# Patient Record
Sex: FEMALE | Race: WHITE | NOT HISPANIC OR LATINO | URBAN - METROPOLITAN AREA
[De-identification: names, ages, dates, MRNs, and addresses within clinical notes are randomized per-mention and may not be internally consistent; named-entity substitution may affect disease eponyms.]

---

## 2017-01-30 ENCOUNTER — OUTPATIENT (OUTPATIENT)
Dept: OUTPATIENT SERVICES | Facility: HOSPITAL | Age: 82
LOS: 1 days | Discharge: HOME | End: 2017-01-30

## 2017-06-27 DIAGNOSIS — R91.8 OTHER NONSPECIFIC ABNORMAL FINDING OF LUNG FIELD: ICD-10-CM

## 2017-10-23 ENCOUNTER — OUTPATIENT (OUTPATIENT)
Dept: OUTPATIENT SERVICES | Facility: HOSPITAL | Age: 82
LOS: 1 days | Discharge: HOME | End: 2017-10-23

## 2017-10-23 DIAGNOSIS — Z12.31 ENCOUNTER FOR SCREENING MAMMOGRAM FOR MALIGNANT NEOPLASM OF BREAST: ICD-10-CM

## 2018-09-29 ENCOUNTER — INPATIENT (INPATIENT)
Facility: HOSPITAL | Age: 83
LOS: 5 days | Discharge: NURSING, REHAB OR LONG-TERM CARE | End: 2018-10-05
Attending: SPECIALIST | Admitting: SPECIALIST

## 2018-09-29 VITALS
SYSTOLIC BLOOD PRESSURE: 167 MMHG | RESPIRATION RATE: 18 BRPM | HEART RATE: 110 BPM | OXYGEN SATURATION: 96 % | DIASTOLIC BLOOD PRESSURE: 72 MMHG

## 2018-09-29 LAB
ALBUMIN SERPL ELPH-MCNC: 4.4 G/DL — SIGNIFICANT CHANGE UP (ref 3.5–5.2)
ALP SERPL-CCNC: 74 U/L — SIGNIFICANT CHANGE UP (ref 30–115)
ALT FLD-CCNC: 16 U/L — SIGNIFICANT CHANGE UP (ref 0–41)
ANION GAP SERPL CALC-SCNC: 22 MMOL/L — HIGH (ref 7–14)
APTT BLD: 28.4 SEC — SIGNIFICANT CHANGE UP (ref 27–39.2)
AST SERPL-CCNC: 23 U/L — SIGNIFICANT CHANGE UP (ref 0–41)
BASOPHILS # BLD AUTO: 0.06 K/UL — SIGNIFICANT CHANGE UP (ref 0–0.2)
BASOPHILS NFR BLD AUTO: 0.6 % — SIGNIFICANT CHANGE UP (ref 0–1)
BILIRUB SERPL-MCNC: 0.4 MG/DL — SIGNIFICANT CHANGE UP (ref 0.2–1.2)
BLD GP AB SCN SERPL QL: SIGNIFICANT CHANGE UP
BUN SERPL-MCNC: 30 MG/DL — HIGH (ref 10–20)
CALCIUM SERPL-MCNC: 9.9 MG/DL — SIGNIFICANT CHANGE UP (ref 8.5–10.1)
CHLORIDE SERPL-SCNC: 93 MMOL/L — LOW (ref 98–110)
CO2 SERPL-SCNC: 19 MMOL/L — SIGNIFICANT CHANGE UP (ref 17–32)
CREAT SERPL-MCNC: 1.1 MG/DL — SIGNIFICANT CHANGE UP (ref 0.7–1.5)
EOSINOPHIL # BLD AUTO: 0.16 K/UL — SIGNIFICANT CHANGE UP (ref 0–0.7)
EOSINOPHIL NFR BLD AUTO: 1.6 % — SIGNIFICANT CHANGE UP (ref 0–8)
GLUCOSE SERPL-MCNC: 130 MG/DL — HIGH (ref 70–99)
HCT VFR BLD CALC: 37.7 % — SIGNIFICANT CHANGE UP (ref 37–47)
HGB BLD-MCNC: 12.9 G/DL — SIGNIFICANT CHANGE UP (ref 12–16)
IMM GRANULOCYTES NFR BLD AUTO: 0.4 % — HIGH (ref 0.1–0.3)
INR BLD: 1.14 RATIO — SIGNIFICANT CHANGE UP (ref 0.65–1.3)
LYMPHOCYTES # BLD AUTO: 3.44 K/UL — HIGH (ref 1.2–3.4)
LYMPHOCYTES # BLD AUTO: 34.1 % — SIGNIFICANT CHANGE UP (ref 20.5–51.1)
MCHC RBC-ENTMCNC: 29.9 PG — SIGNIFICANT CHANGE UP (ref 27–31)
MCHC RBC-ENTMCNC: 34.2 G/DL — SIGNIFICANT CHANGE UP (ref 32–37)
MCV RBC AUTO: 87.5 FL — SIGNIFICANT CHANGE UP (ref 81–99)
MONOCYTES # BLD AUTO: 0.8 K/UL — HIGH (ref 0.1–0.6)
MONOCYTES NFR BLD AUTO: 7.9 % — SIGNIFICANT CHANGE UP (ref 1.7–9.3)
NEUTROPHILS # BLD AUTO: 5.6 K/UL — SIGNIFICANT CHANGE UP (ref 1.4–6.5)
NEUTROPHILS NFR BLD AUTO: 55.4 % — SIGNIFICANT CHANGE UP (ref 42.2–75.2)
NRBC # BLD: 0 /100 WBCS — SIGNIFICANT CHANGE UP (ref 0–0)
PLATELET # BLD AUTO: 225 K/UL — SIGNIFICANT CHANGE UP (ref 130–400)
POTASSIUM SERPL-MCNC: 4.1 MMOL/L — SIGNIFICANT CHANGE UP (ref 3.5–5)
POTASSIUM SERPL-SCNC: 4.1 MMOL/L — SIGNIFICANT CHANGE UP (ref 3.5–5)
PROT SERPL-MCNC: 7 G/DL — SIGNIFICANT CHANGE UP (ref 6–8)
PROTHROM AB SERPL-ACNC: 12.3 SEC — SIGNIFICANT CHANGE UP (ref 9.95–12.87)
RBC # BLD: 4.31 M/UL — SIGNIFICANT CHANGE UP (ref 4.2–5.4)
RBC # FLD: 12.2 % — SIGNIFICANT CHANGE UP (ref 11.5–14.5)
SODIUM SERPL-SCNC: 134 MMOL/L — LOW (ref 135–146)
TYPE + AB SCN PNL BLD: SIGNIFICANT CHANGE UP
WBC # BLD: 10.1 K/UL — SIGNIFICANT CHANGE UP (ref 4.8–10.8)
WBC # FLD AUTO: 10.1 K/UL — SIGNIFICANT CHANGE UP (ref 4.8–10.8)

## 2018-09-29 RX ORDER — MORPHINE SULFATE 50 MG/1
2 CAPSULE, EXTENDED RELEASE ORAL ONCE
Qty: 0 | Refills: 0 | Status: DISCONTINUED | OUTPATIENT
Start: 2018-09-29 | End: 2018-09-29

## 2018-09-29 RX ORDER — ONDANSETRON 8 MG/1
4 TABLET, FILM COATED ORAL ONCE
Qty: 0 | Refills: 0 | Status: COMPLETED | OUTPATIENT
Start: 2018-09-29 | End: 2018-09-29

## 2018-09-29 RX ORDER — MORPHINE SULFATE 50 MG/1
4 CAPSULE, EXTENDED RELEASE ORAL ONCE
Qty: 0 | Refills: 0 | Status: DISCONTINUED | OUTPATIENT
Start: 2018-09-29 | End: 2018-09-29

## 2018-09-29 RX ADMIN — MORPHINE SULFATE 4 MILLIGRAM(S): 50 CAPSULE, EXTENDED RELEASE ORAL at 18:22

## 2018-09-29 RX ADMIN — ONDANSETRON 4 MILLIGRAM(S): 8 TABLET, FILM COATED ORAL at 20:47

## 2018-09-29 RX ADMIN — MORPHINE SULFATE 2 MILLIGRAM(S): 50 CAPSULE, EXTENDED RELEASE ORAL at 20:47

## 2018-09-29 RX ADMIN — MORPHINE SULFATE 4 MILLIGRAM(S): 50 CAPSULE, EXTENDED RELEASE ORAL at 19:46

## 2018-09-29 NOTE — ED ADULT NURSE NOTE - OBJECTIVE STATEMENT
patient states she tripped and fell off of one step and landed on her left hip. denies head trauma, denies loc. denies blood thinners. c/o left hip pain and states she cannot move left leg. b/l pedal pulses intact

## 2018-09-29 NOTE — ED ADULT NURSE NOTE - NSIMPLEMENTINTERV_GEN_ALL_ED
Implemented All Fall with Harm Risk Interventions:  [place] to call system. Call bell, personal items and telephone within reach. Instruct patient to call for assistance. Room bathroom lighting operational. Non-slip footwear when patient is off stretcher. Physically safe environment: no spills, clutter or unnecessary equipment. Stretcher in lowest position, wheels locked, appropriate side rails in place. Provide visual cue, wrist band, yellow gown, etc. Monitor gait and stability. Monitor for mental status changes and reorient to person, place, and time. Review medications for side effects contributing to fall risk. Reinforce activity limits and safety measures with patient and family. Provide visual clues: red socks.

## 2018-09-29 NOTE — ED PROVIDER NOTE - OBJECTIVE STATEMENT
Patient is an 89 yo F w/ hx of HTN, osteoporosis, p/w fall. Around 4:30 pm, patient had mechanical fall down one step landed on her left side. Patient experienced immediate pain, was not able to stand after incident. Pain is on her left hip radiates down the leg; pain is severe. No head injury, no LOC, no other extremity pain. Denies chest pain, SOB, blood thinners.

## 2018-09-29 NOTE — ED PROVIDER NOTE - ATTENDING CONTRIBUTION TO CARE
89y f h/o htn, OP, hl p/w fall. Was walking down sev steps, missed last step and fell onto her L side. Unwitnessed, pt denies head trauma or loc. Now with pain down L lat hip & thigh. Unable range hip 2/2 pain. No other injuries. Denies ha, neck pain, cp/sob, cough, nv abd pain, back pain, flank pain, urinary sx, rash, FNDs. PE: elderly f appears in pain, neck supple, chest atraumatic non-ttp no ecchymosis or crepitus, rrr nl s1s2 no mrg, ctab no wrr, abd soft ntnd no palpable masses no rgr, back no midline or paraspinal ttp, pelvis stable, LLE- +ttp over L lat hip and L groin, unable to range hip 2/2 pain, rom/strength intact throughout remainder of ext, sensation intact throughout, dpi, cr<2s, remainder of ext exam nl a/p: see mdm

## 2018-09-29 NOTE — CONSULT NOTE ADULT - SUBJECTIVE AND OBJECTIVE BOX
HIP FRACTURE CONSULT  T(C): --  HR: 110 (09-29-18 @ 20:13) (110 - 110)  BP: 148/67 (09-29-18 @ 20:13) (148/67 - 167/72)  RR: 18 (09-29-18 @ 17:30) (18 - 18)  SpO2: 96% (09-29-18 @ 17:30) (96% - 96%)    FEMUR NECK FRACTURE  ^FEMUR NECK FRACTURE  89F with GLF onto left side when descending one step. Pt fell on left side and had immediate pain in left hip. Denies head trauma or LOC. Denies numbness, tingling, paraesthesias.                           12.9   10.10 )-----------( 225      ( 29 Sep 2018 18:10 )             37.7     09-29    134<L>  |  93<L>  |  30<H>  ----------------------------<  130<H>  4.1   |  19  |  1.1    Ca    9.9      29 Sep 2018 18:10    TPro  7.0  /  Alb  4.4  /  TBili  0.4  /  DBili  x   /  AST  23  /  ALT  16  /  AlkPhos  74  09-29    PT/INR - ( 29 Sep 2018 18:10 )   PT: 12.30 sec;   INR: 1.14 ratio         PTT - ( 29 Sep 2018 18:10 )  PTT:28.4 sec    No Known Allergies    Physical exam :  NAD  BCRA  LLE  TTP over hip joint  Severe pain on motion.  N/V/I  The remainder of the musculoskeletal exam fails to reveal any acute deformity , or new areas of pain or sts.    Assesment:   89F with L femoral neck hip fracture  Plan:  Bed rest  Continue mechanical DVT prophylaxis  / heparin for DVT prophylaxis  NWB LLE  Medical risk assesment  rpt hgb  2 units prbc on hold for surgery.  Plan for surgery on 10/1  pre-op labs, chest xray, ekg, T&S  management per primary team

## 2018-09-29 NOTE — ED PROVIDER NOTE - PHYSICAL EXAMINATION
CONSTITUTIONAL: Well-developed; well-nourished; in acute distress.   SKIN: warm, dry.  HEAD: Normocephalic; atraumatic. No raccoon eyes; no francois sign.   EYES: PERRL, EOMI, no conjunctival erythema.  ENT: No nasal discharge; airway clear.  NECK: Supple; non tender. No midline tenderness.   CARD: S1, S2 normal; no murmurs, gallops, or rubs. Tachycardic.   RESP: left basilar crackles.  ABD: soft ntnd.  Pelvis: No pelvic instability.   EXT: left lower extremity is shortened and externally rotated. DP pulses 2+ bilat.   NEURO: Alert, oriented, grossly unremarkable. Sensations intact in lower extremities bilaterally.   PSYCH: Cooperative, appropriate.

## 2018-09-29 NOTE — ED PROVIDER NOTE - NS ED ROS FT
Head: No head injury.   Eyes:  No visual changes.  ENMT:  No dental injury.   Cardiac:  No chest pain, SOB.  Respiratory:  No cough or respiratory distress.   GI:  No nausea, vomiting, diarrhea or abdominal pain.  :  No dysuria, frequency or burning.  MS:  + left hip pain.   Neuro:  No headache or weakness.  No LOC. No head injury.   Skin:  No abrasions/lacerations.

## 2018-09-30 DIAGNOSIS — Z98.890 OTHER SPECIFIED POSTPROCEDURAL STATES: Chronic | ICD-10-CM

## 2018-09-30 LAB
ANION GAP SERPL CALC-SCNC: 18 MMOL/L — HIGH (ref 7–14)
APTT BLD: 28.3 SEC — SIGNIFICANT CHANGE UP (ref 27–39.2)
BASOPHILS # BLD AUTO: 0 K/UL — SIGNIFICANT CHANGE UP (ref 0–0.2)
BASOPHILS NFR BLD AUTO: 0 % — SIGNIFICANT CHANGE UP (ref 0–1)
BUN SERPL-MCNC: 22 MG/DL — HIGH (ref 10–20)
CALCIUM SERPL-MCNC: 9.4 MG/DL — SIGNIFICANT CHANGE UP (ref 8.5–10.1)
CHLORIDE SERPL-SCNC: 98 MMOL/L — SIGNIFICANT CHANGE UP (ref 98–110)
CO2 SERPL-SCNC: 21 MMOL/L — SIGNIFICANT CHANGE UP (ref 17–32)
CREAT SERPL-MCNC: 0.8 MG/DL — SIGNIFICANT CHANGE UP (ref 0.7–1.5)
EOSINOPHIL # BLD AUTO: 0 K/UL — SIGNIFICANT CHANGE UP (ref 0–0.7)
EOSINOPHIL NFR BLD AUTO: 0 % — SIGNIFICANT CHANGE UP (ref 0–8)
GLUCOSE SERPL-MCNC: 158 MG/DL — HIGH (ref 70–99)
HCT VFR BLD CALC: 37.7 % — SIGNIFICANT CHANGE UP (ref 37–47)
HGB BLD-MCNC: 12.5 G/DL — SIGNIFICANT CHANGE UP (ref 12–16)
IMM GRANULOCYTES NFR BLD AUTO: 0.4 % — HIGH (ref 0.1–0.3)
INR BLD: 1.2 RATIO — SIGNIFICANT CHANGE UP (ref 0.65–1.3)
LYMPHOCYTES # BLD AUTO: 0.77 K/UL — LOW (ref 1.2–3.4)
LYMPHOCYTES # BLD AUTO: 8.2 % — LOW (ref 20.5–51.1)
MAGNESIUM SERPL-MCNC: 2 MG/DL — SIGNIFICANT CHANGE UP (ref 1.8–2.4)
MCHC RBC-ENTMCNC: 29.6 PG — SIGNIFICANT CHANGE UP (ref 27–31)
MCHC RBC-ENTMCNC: 33.2 G/DL — SIGNIFICANT CHANGE UP (ref 32–37)
MCV RBC AUTO: 89.3 FL — SIGNIFICANT CHANGE UP (ref 81–99)
MONOCYTES # BLD AUTO: 0.46 K/UL — SIGNIFICANT CHANGE UP (ref 0.1–0.6)
MONOCYTES NFR BLD AUTO: 4.9 % — SIGNIFICANT CHANGE UP (ref 1.7–9.3)
NEUTROPHILS # BLD AUTO: 8.08 K/UL — HIGH (ref 1.4–6.5)
NEUTROPHILS NFR BLD AUTO: 86.5 % — HIGH (ref 42.2–75.2)
PHOSPHATE SERPL-MCNC: 3.8 MG/DL — SIGNIFICANT CHANGE UP (ref 2.1–4.9)
PLATELET # BLD AUTO: 192 K/UL — SIGNIFICANT CHANGE UP (ref 130–400)
POTASSIUM SERPL-MCNC: 5 MMOL/L — SIGNIFICANT CHANGE UP (ref 3.5–5)
POTASSIUM SERPL-SCNC: 5 MMOL/L — SIGNIFICANT CHANGE UP (ref 3.5–5)
PROTHROM AB SERPL-ACNC: 12.9 SEC — HIGH (ref 9.95–12.87)
RBC # BLD: 4.22 M/UL — SIGNIFICANT CHANGE UP (ref 4.2–5.4)
RBC # FLD: 12.5 % — SIGNIFICANT CHANGE UP (ref 11.5–14.5)
SODIUM SERPL-SCNC: 137 MMOL/L — SIGNIFICANT CHANGE UP (ref 135–146)
TYPE + AB SCN PNL BLD: SIGNIFICANT CHANGE UP
WBC # BLD: 9.35 K/UL — SIGNIFICANT CHANGE UP (ref 4.8–10.8)
WBC # FLD AUTO: 9.35 K/UL — SIGNIFICANT CHANGE UP (ref 4.8–10.8)

## 2018-09-30 RX ORDER — ATORVASTATIN CALCIUM 80 MG/1
20 TABLET, FILM COATED ORAL AT BEDTIME
Qty: 0 | Refills: 0 | Status: DISCONTINUED | OUTPATIENT
Start: 2018-09-30 | End: 2018-10-02

## 2018-09-30 RX ORDER — SODIUM CHLORIDE 9 MG/ML
1000 INJECTION INTRAMUSCULAR; INTRAVENOUS; SUBCUTANEOUS ONCE
Qty: 0 | Refills: 0 | Status: COMPLETED | OUTPATIENT
Start: 2018-09-30 | End: 2018-09-30

## 2018-09-30 RX ORDER — AMLODIPINE BESYLATE 2.5 MG/1
5 TABLET ORAL AT BEDTIME
Qty: 0 | Refills: 0 | Status: DISCONTINUED | OUTPATIENT
Start: 2018-09-30 | End: 2018-10-02

## 2018-09-30 RX ORDER — AMLODIPINE BESYLATE 2.5 MG/1
1 TABLET ORAL
Qty: 0 | Refills: 0 | COMMUNITY

## 2018-09-30 RX ORDER — MORPHINE SULFATE 50 MG/1
4 CAPSULE, EXTENDED RELEASE ORAL ONCE
Qty: 0 | Refills: 0 | Status: DISCONTINUED | OUTPATIENT
Start: 2018-09-30 | End: 2018-09-30

## 2018-09-30 RX ORDER — TRAMADOL HYDROCHLORIDE 50 MG/1
50 TABLET ORAL THREE TIMES A DAY
Qty: 0 | Refills: 0 | Status: DISCONTINUED | OUTPATIENT
Start: 2018-09-30 | End: 2018-10-02

## 2018-09-30 RX ORDER — ATENOLOL 25 MG/1
1 TABLET ORAL
Qty: 0 | Refills: 0 | COMMUNITY

## 2018-09-30 RX ORDER — TRAMADOL HYDROCHLORIDE 50 MG/1
25 TABLET ORAL EVERY 6 HOURS
Qty: 0 | Refills: 0 | Status: DISCONTINUED | OUTPATIENT
Start: 2018-09-30 | End: 2018-09-30

## 2018-09-30 RX ORDER — ONDANSETRON 8 MG/1
4 TABLET, FILM COATED ORAL ONCE
Qty: 0 | Refills: 0 | Status: COMPLETED | OUTPATIENT
Start: 2018-09-30 | End: 2018-09-30

## 2018-09-30 RX ORDER — ONDANSETRON 8 MG/1
4 TABLET, FILM COATED ORAL EVERY 6 HOURS
Qty: 0 | Refills: 0 | Status: DISCONTINUED | OUTPATIENT
Start: 2018-09-30 | End: 2018-10-02

## 2018-09-30 RX ORDER — INFLUENZA VIRUS VACCINE 15; 15; 15; 15 UG/.5ML; UG/.5ML; UG/.5ML; UG/.5ML
0.5 SUSPENSION INTRAMUSCULAR ONCE
Qty: 0 | Refills: 0 | Status: DISCONTINUED | OUTPATIENT
Start: 2018-09-30 | End: 2018-10-05

## 2018-09-30 RX ORDER — HEPARIN SODIUM 5000 [USP'U]/ML
5000 INJECTION INTRAVENOUS; SUBCUTANEOUS EVERY 8 HOURS
Qty: 0 | Refills: 0 | Status: DISCONTINUED | OUTPATIENT
Start: 2018-09-30 | End: 2018-10-02

## 2018-09-30 RX ORDER — ATENOLOL 25 MG/1
25 TABLET ORAL DAILY
Qty: 0 | Refills: 0 | Status: DISCONTINUED | OUTPATIENT
Start: 2018-09-30 | End: 2018-10-02

## 2018-09-30 RX ORDER — SODIUM CHLORIDE 9 MG/ML
1000 INJECTION, SOLUTION INTRAVENOUS ONCE
Qty: 0 | Refills: 0 | Status: DISCONTINUED | OUTPATIENT
Start: 2018-09-30 | End: 2018-09-30

## 2018-09-30 RX ADMIN — ONDANSETRON 4 MILLIGRAM(S): 8 TABLET, FILM COATED ORAL at 15:55

## 2018-09-30 RX ADMIN — MORPHINE SULFATE 4 MILLIGRAM(S): 50 CAPSULE, EXTENDED RELEASE ORAL at 09:55

## 2018-09-30 RX ADMIN — ONDANSETRON 4 MILLIGRAM(S): 8 TABLET, FILM COATED ORAL at 21:30

## 2018-09-30 RX ADMIN — ATENOLOL 25 MILLIGRAM(S): 25 TABLET ORAL at 06:27

## 2018-09-30 RX ADMIN — AMLODIPINE BESYLATE 5 MILLIGRAM(S): 2.5 TABLET ORAL at 21:20

## 2018-09-30 RX ADMIN — TRAMADOL HYDROCHLORIDE 50 MILLIGRAM(S): 50 TABLET ORAL at 15:55

## 2018-09-30 RX ADMIN — MORPHINE SULFATE 4 MILLIGRAM(S): 50 CAPSULE, EXTENDED RELEASE ORAL at 00:30

## 2018-09-30 RX ADMIN — SODIUM CHLORIDE 500 MILLILITER(S): 9 INJECTION INTRAMUSCULAR; INTRAVENOUS; SUBCUTANEOUS at 05:27

## 2018-09-30 RX ADMIN — HEPARIN SODIUM 5000 UNIT(S): 5000 INJECTION INTRAVENOUS; SUBCUTANEOUS at 21:21

## 2018-09-30 RX ADMIN — ATORVASTATIN CALCIUM 20 MILLIGRAM(S): 80 TABLET, FILM COATED ORAL at 21:21

## 2018-09-30 RX ADMIN — Medication 10 MILLIGRAM(S): at 06:27

## 2018-09-30 RX ADMIN — ONDANSETRON 4 MILLIGRAM(S): 8 TABLET, FILM COATED ORAL at 07:16

## 2018-09-30 RX ADMIN — TRAMADOL HYDROCHLORIDE 50 MILLIGRAM(S): 50 TABLET ORAL at 21:30

## 2018-09-30 RX ADMIN — HEPARIN SODIUM 5000 UNIT(S): 5000 INJECTION INTRAVENOUS; SUBCUTANEOUS at 06:28

## 2018-09-30 RX ADMIN — ONDANSETRON 4 MILLIGRAM(S): 8 TABLET, FILM COATED ORAL at 02:01

## 2018-09-30 RX ADMIN — HEPARIN SODIUM 5000 UNIT(S): 5000 INJECTION INTRAVENOUS; SUBCUTANEOUS at 15:55

## 2018-09-30 RX ADMIN — MORPHINE SULFATE 2 MILLIGRAM(S): 50 CAPSULE, EXTENDED RELEASE ORAL at 09:55

## 2018-09-30 NOTE — H&P ADULT - NSHPREVIEWOFSYSTEMS_GEN_ALL_CORE
REVIEW OF SYSTEMS:    CONSTITUTIONAL: No weakness or fevers  EYES/ENT: No visual changes  NECK: No pain or stiffness  RESPIRATORY: No cough, wheezing, hemoptysis; No shortness of breath  CARDIOVASCULAR: No chest pain or palpitations, no lower extremity edema  GASTROINTESTINAL: No abdominal pain. No nausea or vomiting; No diarrhea or constipation. No bloody or black colored stool  GENITOURINARY: No pain with urination, no increased urinary frequency, no dark o r bloody urine  NEUROLOGICAL: No numbness or weakness  SKIN: No itching, rashes  MSK: see hpi

## 2018-09-30 NOTE — H&P ADULT - ASSESSMENT
# Left femoral neck hip fracture  - Seen by Ortho - planned for surgery 10/1, pre-op labs, chest xray, ekg, T&S & medical risk assessment  - NWB Left  - 2unit PRBC on hold  - PRN analgesics    # HTN    # Low Bicarb    DVT ppx - heparin  NWB left  DASH diet 89/F hx of HTN, p/w fall from standing after loss of balance. Found to have left fremoral neck fx. Admit to medicine for Left femoral neck fx.    # Left femoral neck hip fracture  - Seen by Ortho - planned for surgery 10/1, pre-op labs, chest xray, ekg, T&S & medical risk assessment  - NWB Left  - 2unit PRBC on hold - please order once T&S is completed  - PRN analgesics    # HTN  - C/w atenolol, ACEi, CCB    # DLD - c/w statin    # Low Bicarb  - Give 1L IVF, repeat BMP  - No hypotension    # Lung Nodule  - Followed with pulm (dr. Barraza) - was advised no longer needs to follows since nodule was stable over 2 yrs    DVT ppx - heparin  NWB left  DASH diet  No GI ppx    Full Code 89/F hx of HTN, p/w fall from standing after loss of balance. Found to have left fremoral neck fx. Admit to medicine for Left femoral neck fx.    # Left femoral neck hip fracture  - Seen by Ortho - planned for surgery 10/1, pre-op labs, chest xray, ekg, T&S & medical risk assessment  - NWB Left  - 2unit PRBC on hold - please order once T&S is completed  - PRN analgesics    # HTN  - C/w atenolol, ACEi, CCB    # DLD - c/w statin    # CKD IIIBI? w/Low Bicarb  - Give 1L IVF, repeat BMP  - Check UA & Ur protein / Cr ratio  - No hypotension    # Lung Nodule  - Followed with pulm (dr. Barraza) - was advised no longer needs to follows since nodule was stable over 2 yrs    DVT ppx - heparin  NWB left  DASH diet  No GI ppx    Full Code

## 2018-09-30 NOTE — H&P ADULT - HISTORY OF PRESENT ILLNESS
89/F hx of osteoporosis, HTN, p/w fall    On day of presentation pt fell from one step and landed on left side. Pt was unable to stand, has left hip pain radiating down left leg.    No other injuries, no LOC, no chest pain, no SOB. 89/F hx of HTN, p/w fall from standing after loss of balance.    On day of presentation pt fell from one step and landed on left side. She reports she lost her balance and fell to her left side and had pain to left side. Pt was unable to stand, has left hip pain radiating down left leg. Neighbors that live below heard the fall and came to help her. No other injuries, no LOC, no chest pain, no SOB. No palpatations, no chest pain, no sob.    Pt is active at home, drives, lives alone.

## 2018-09-30 NOTE — H&P ADULT - NSHPLABSRESULTS_GEN_ALL_CORE
12.9   10.10 )-----------( 225      ( 29 Sep 2018 18:10 )             37.7       09-29    134<L>  |  93<L>  |  30<H>  ----------------------------<  130<H>  4.1   |  19  |  1.1    Ca    9.9      29 Sep 2018 18:10    TPro  7.0  /  Alb  4.4  /  TBili  0.4  /  DBili  x   /  AST  23  /  ALT  16  /  AlkPhos  74  09-29          PT/INR - ( 29 Sep 2018 18:10 )   PT: 12.30 sec;   INR: 1.14 ratio         PTT - ( 29 Sep 2018 18:10 )  PTT:28.4 sec    Lactate Trend    CAPILLARY BLOOD GLUCOSE    CTH   No CT evidence of acute intracranial pathology.    Slight increase of chronic migraine vascular ischemic changes since nearly 6 years prior.    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 Bilateral femoral heads in   anatomic alignment.    Bones are diffusely osteopenic

## 2018-09-30 NOTE — H&P ADULT - NSHPPHYSICALEXAM_GEN_ALL_CORE
Vital Signs Last 24 Hrs  T(C): 35.7 (30 Sep 2018 00:11), Max: 35.7 (30 Sep 2018 00:11)  T(F): 96.2 (30 Sep 2018 00:11), Max: 96.2 (30 Sep 2018 00:11)  HR: 107 (30 Sep 2018 00:11) (107 - 110)  BP: 146/65 (30 Sep 2018 00:11) (146/65 - 167/72)  RR: 20 (30 Sep 2018 00:11) (18 - 20)  SpO2: 96% (30 Sep 2018 00:11) (96% - 96%) Vital Signs Last 24 Hrs  T(C): 35.7 (30 Sep 2018 00:11), Max: 35.7 (30 Sep 2018 00:11)  T(F): 96.2 (30 Sep 2018 00:11), Max: 96.2 (30 Sep 2018 00:11)  HR: 107 (30 Sep 2018 00:11) (107 - 110)  BP: 146/65 (30 Sep 2018 00:11) (146/65 - 167/72)  RR: 20 (30 Sep 2018 00:11) (18 - 20)  SpO2: 96% (30 Sep 2018 00:11) (96% - 96%)    PHYSICAL EXAM:  GENERAL: NAD, speaks in full sentences, no signs of respiratory distress  HEAD:  Atraumatic, Normocephalic  EYES: EOMI, PERRLA, conjunctiva and sclera clear  NECK: Supple, No JVD  CHEST/LUNG: Clear to auscultation bilaterally; No wheeze; No crackles; No accessory muscles used  HEART: Regular rate and rhythm; No murmurs;   ABDOMEN: Soft, Nontender, Nondistended; Bowel sounds present; No guarding  EXTREMITIES:  no edema, left lower ext is turned outward, no erythema or rash to lateral left leg  PSYCH: AAOx3  NEUROLOGY: non-focal  SKIN: No rashes or lesions

## 2018-10-01 RX ORDER — SODIUM CHLORIDE 9 MG/ML
1000 INJECTION INTRAMUSCULAR; INTRAVENOUS; SUBCUTANEOUS
Qty: 0 | Refills: 0 | Status: DISCONTINUED | OUTPATIENT
Start: 2018-10-01 | End: 2018-10-02

## 2018-10-01 RX ADMIN — TRAMADOL HYDROCHLORIDE 50 MILLIGRAM(S): 50 TABLET ORAL at 21:25

## 2018-10-01 RX ADMIN — HEPARIN SODIUM 5000 UNIT(S): 5000 INJECTION INTRAVENOUS; SUBCUTANEOUS at 21:24

## 2018-10-01 RX ADMIN — TRAMADOL HYDROCHLORIDE 50 MILLIGRAM(S): 50 TABLET ORAL at 14:34

## 2018-10-01 RX ADMIN — SODIUM CHLORIDE 75 MILLILITER(S): 9 INJECTION INTRAMUSCULAR; INTRAVENOUS; SUBCUTANEOUS at 22:55

## 2018-10-01 RX ADMIN — AMLODIPINE BESYLATE 5 MILLIGRAM(S): 2.5 TABLET ORAL at 21:24

## 2018-10-01 RX ADMIN — HEPARIN SODIUM 5000 UNIT(S): 5000 INJECTION INTRAVENOUS; SUBCUTANEOUS at 05:01

## 2018-10-01 RX ADMIN — Medication 10 MILLIGRAM(S): at 05:01

## 2018-10-01 RX ADMIN — HEPARIN SODIUM 5000 UNIT(S): 5000 INJECTION INTRAVENOUS; SUBCUTANEOUS at 14:34

## 2018-10-01 RX ADMIN — TRAMADOL HYDROCHLORIDE 50 MILLIGRAM(S): 50 TABLET ORAL at 05:03

## 2018-10-01 RX ADMIN — ATORVASTATIN CALCIUM 20 MILLIGRAM(S): 80 TABLET, FILM COATED ORAL at 21:24

## 2018-10-01 RX ADMIN — ATENOLOL 25 MILLIGRAM(S): 25 TABLET ORAL at 05:01

## 2018-10-01 NOTE — PROGRESS NOTE ADULT - ASSESSMENT
________________________________________________________________________________  DAILY PROGRESS NOTE:    ================== SUBJECTIVE ==================    ART LYONS  /   89y  /  Female  /  MRN#: [id number]  Patient is a 89y old Female who presents with a chief complaint of Fall from standing (01 Oct 2018 13:16)  Currently admitted to medicine with the primary diagnosis of Femur neck fracture    HOSPITAL DAY: 2d     TODAY: Patient was seen this morning at bedside. Currently, the patient reports no complaints     Review of systems is otherwise negative.    =================== OBJECTIVE ===================    VITAL SIGNS: Last 24 Hours  T(C): 35.8 (01 Oct 2018 18:04), Max: 36.7 (30 Sep 2018 21:45)  T(F): 96.4 (01 Oct 2018 18:04), Max: 98 (30 Sep 2018 21:45)  HR: 82 (01 Oct 2018 17:59) (75 - 101)  BP: 138/65 (01 Oct 2018 17:59) (138/65 - 171/73)  BP(mean): --  RR: 18 (01 Oct 2018 17:59) (18 - 20)  SpO2: 95% (01 Oct 2018 00:00) (95% - 98%)    10-01-18 @ 07:01  -  10-01-18 @ 18:22  --------------------------------------------------------  IN: 480 mL / OUT: 500 mL / NET: -20 mL    GENERAL: NAD, well-groomed, well-developed  NERVOUS SYSTEM:  Alert & Oriented X3, Good concentration; Motor Strength 5/5 B/L upper and lower extremities; DTRs 2+ intact and symmetric  CHEST/LUNG: Clear to percussion bilaterally; No rales, rhonchi, wheezing, or rubs  HEART: Regular rate and rhythm; No murmurs, rubs, or gallops  ABDOMEN: Soft, Nontender, Nondistended; Bowel sounds present  EXTREMITIES:  2+ Peripheral Pulses, No clubbing, cyanosis, or edema, pain L hip  SKIN: No rashes or lesions    ===================== LABS =====================                        12.5   9.35  )-----------( 192      ( 30 Sep 2018 07:25 )             37.7     09-30    137  |  98  |  22<H>  ----------------------------<  158<H>  5.0   |  21  |  0.8    Ca    9.4      30 Sep 2018 07:25  Phos  3.8     09-30  Mg     2.0     09-30      PT/INR - ( 30 Sep 2018 07:25 )   PT: 12.90 sec;   INR: 1.20 ratio         PTT - ( 30 Sep 2018 07:25 )  PTT:28.3 sec    ================== ALLERGIES ===================  No Known Allergies    ==================== MEDS =====================  amLODIPine   Tablet 5 milliGRAM(s) Oral at bedtime  ATENolol  Tablet 25 milliGRAM(s) Oral daily  atorvastatin 20 milliGRAM(s) Oral at bedtime  enalapril 10 milliGRAM(s) Oral daily  heparin  Injectable 5000 Unit(s) SubCutaneous every 8 hours  influenza   Vaccine 0.5 milliLiter(s) IntraMuscular once  traMADol 50 milliGRAM(s) Oral three times a day    PRN MEDICATIONS  ondansetron    Tablet 4 milliGRAM(s) Oral every 6 hours PRN    ================= ASSESS/PLAN ==================  Patient is a 89y old Female who presents with a chief complaint of Fall from standing (01 Oct 2018 13:16)  Currently admitted to medicine with the primary diagnosis of Femur neck fracture      PLAN  # Left femoral neck hip fracture  - Seen by Ortho - planned for surgery 10/1, pre-op labs, chest xray, ekg, T&S & medical risk assessment  - NWB Left  - 2unit PRBC on hold   - PRN analgesics  - As per cardiology   moderate risk patient (age) and surgery (orthopedics surgery), functional capacity at limited side, less than 4 METS, generally by accepting a moderate risk she can have the surgery done, she is already on beta blocker and statin  if bp remains high or HR stays above 90 then increase Atenolol to 50 mg daily  post op care as per the events     # HTN  - C/w atenolol, ACEi, CCB, BP OK    # DLD - c/w statin    # CKD IIIBI? w/Low Bicarb  - Give 1L IVF, repeat BMP  - Check UA & Ur protein / Cr ratio  - No hypotension    # Lung Nodule  - Followed with pulm (dr. Ross) - was advised no longer needs to follows since nodule was stable over 2 yrs      GI PPX: -  DVT PPX: Heparin SQ    DIET:  (X) Regular  /  () Cardiac  / () Renal  /  () Diabetic  /  () Fluid restriction  /   () NPO  ACTIVITY:  () Ad Mandi  /  (X) Advance as Tolerated  /  () Bed Rest  /  () Fall Precaution  /  () Seizure precaution    ================= PRESENT TODAY ==================    Gaviria Catheter (X) No / () Yes   Indication:  Central Line (X) No / ()Yes  Indication:  IV Fluids (X) No / ()Yes  Indication:     ================= DISPOSITION ==================    Patient to be discharged when condition(s) optimized.            Discharge to:  (X) Home  /  () SNF  /  () Hospice  /  () Other            Home services:  () Home health  /  () Rehab  /  () IV Abx  /  () Education  /  () None            Counseled on/Discussed cessation of:  () Risky behaviors  /  () Smoking cessation  /  () Diet  /  () Exercise  /  () Other    ================= CODE STATUS =================                  (X) FULL CODE     |     () DNR     |     () DNI

## 2018-10-01 NOTE — PROGRESS NOTE ADULT - SUBJECTIVE AND OBJECTIVE BOX
ART LYONS  89y  Female      Patient is a 89y old  Female who presents with a chief complaint of Fall from standing (01 Oct 2018 06:10), Sustained L hip fracture, plan OR today. No c/o Cp, LOC, head trauma or SOB      INTERVAL HPI/OVERNIGHT EVENTS:   No overnight events    HEALTH ISSUES - PROBLEM Dx:    Dyslipidemia    Essential hypertension    Lung nodule    Osteoporosis.      Vital Signs Last 24 Hrs  T(C): 35.6 (01 Oct 2018 07:42), Max: 36.7 (30 Sep 2018 21:45)  T(F): 96.1 (01 Oct 2018 07:42), Max: 98 (30 Sep 2018 21:45)  HR: 75 (01 Oct 2018 07:42) (75 - 102)  BP: 171/73 (01 Oct 2018 07:42) (141/68 - 171/74)  BP(mean): --  RR: 18 (01 Oct 2018 07:42) (18 - 20)  SpO2: 95% (01 Oct 2018 00:00) (95% - 98%)    amLODIPine   Tablet 5 milliGRAM(s) Oral at bedtime  ATENolol  Tablet 25 milliGRAM(s) Oral daily  atorvastatin 20 milliGRAM(s) Oral at bedtime  enalapril 10 milliGRAM(s) Oral daily  heparin  Injectable 5000 Unit(s) SubCutaneous every 8 hours  influenza   Vaccine 0.5 milliLiter(s) IntraMuscular once  ondansetron    Tablet 4 milliGRAM(s) Oral every 6 hours PRN  traMADol 50 milliGRAM(s) Oral three times a day      PHYSICAL EXAM:  GENERAL: NAD, well-groomed, well-developed  HEAD:  Atraumatic, Normocephalic  EYES: EOMI, PERRLA, conjunctiva and sclera clear  HEENT: No tonsillar erythema, exudates, or enlargement; Moist mucous membranes, Good dentition, No lesions  NECK: Supple, No JVD, Normal thyroid  NERVOUS SYSTEM:  Alert & Oriented X3, Good concentration; Motor Strength 5/5 B/L upper and lower extremities; DTRs 2+ intact and symmetric  CHEST/LUNG: Clear to percussion bilaterally; No rales, rhonchi, wheezing, or rubs  HEART: Regular rate and rhythm; No murmurs, rubs, or gallops  ABDOMEN: Soft, Nontender, Nondistended; Bowel sounds present  EXTREMITIES:  2+ Peripheral Pulses, No clubbing, cyanosis, or edema, pain L hip  LYMPH: No lymphadenopathy noted  SKIN: No rashes or lesions      LABS                         12.5   9.35  )-----------( 192      ( 30 Sep 2018 07:25 )             37.7     09-30    137  |  98  |  22<H>  ----------------------------<  158<H>  5.0   |  21  |  0.8    Ca    9.4      30 Sep 2018 07:25  Phos  3.8     09-30  Mg     2.0     09-30    TPro  7.0  /  Alb  4.4  /  TBili  0.4  /  DBili  x   /  AST  23  /  ALT  16  /  AlkPhos  74  09-29      RADIOLOGY & ADDITIONAL TESTS:    ASSESSMENT & PLAN:     · Assessment		  89/F hx of HTN, p/w fall from standing after loss of balance. Found to have left femoral neck fx. Admitted to medicine for Left femoral neck fx.    # Left femoral neck hip fracture  - Seen by Ortho - planned for surgery 10/1, pre-op labs, chest xray, ekg, T&S & medical risk assessment  - NWB Left  - 2unit PRBC on hold - please order once T&S is completed  - PRN analgesics  Await cardio eval today    # HTN  - C/w atenolol, ACEi, CCB, BP OK    # DLD - c/w statin    # CKD IIIBI? w/Low Bicarb  - Give 1L IVF, repeat BMP  - Check UA & Ur protein / Cr ratio  - No hypotension    # Lung Nodule  - Followed with pulm (dr. Barraza) - was advised no longer needs to follows since nodule was stable over 2 yrs    DVT ppx - heparin  NWB left  DASH diet  No GI ppx    Full Code

## 2018-10-01 NOTE — CONSULT NOTE ADULT - SUBJECTIVE AND OBJECTIVE BOX
Patient is a 89y old  Female who presents with a chief complaint of Fall from standing (01 Oct 2018 08:58)    patient had a fall, sustained left hip fx, needs surgery. she had no cardiac event or cardiac symptom in the past, suffers of HTN and hyperlipidemia, as out patient BP had been stable, as per patient, in the hospital elevated. Her ECG at the HR of 114/min was normal      HPI:  89/F hx of HTN, p/w fall from standing after loss of balance.    On day of presentation pt fell from one step and landed on left side. She reports she lost her balance and fell to her left side and had pain to left side. Pt was unable to stand, has left hip pain radiating down left leg. Neighbors that live below heard the fall and came to help her. No other injuries, no LOC, no chest pain, no SOB. No palpitations , no chest pain, no sob.    Pt is active at home, drives, lives alone. (30 Sep 2018 01:35)      PAST MEDICAL & SURGICAL HISTORY:  Lung nodule  Osteoporosis  Dyslipidemia  Essential hypertension  H/O lumpectomy: 2015, right, pt states benign pathology      PREVIOUS DIAGNOSTIC TESTING:      MEDICATIONS  (STANDING):  amLODIPine   Tablet 5 milliGRAM(s) Oral at bedtime  ATENolol  Tablet 25 milliGRAM(s) Oral daily  atorvastatin 20 milliGRAM(s) Oral at bedtime  enalapril 10 milliGRAM(s) Oral daily  heparin  Injectable 5000 Unit(s) SubCutaneous every 8 hours  influenza   Vaccine 0.5 milliLiter(s) IntraMuscular once  traMADol 50 milliGRAM(s) Oral three times a day    MEDICATIONS  (PRN):  ondansetron    Tablet 4 milliGRAM(s) Oral every 6 hours PRN Nausea and/or Vomiting      FAMILY HISTORY:  No pertinent family history in first degree relatives      SOCIAL HISTORY:  CIGARETTES: no  ALCOHOL: no  DRUGS: no                      REVIEW OF SYSTEMS:  CONSTITUTIONAL: No distress, Looks stable  NECK: No pain or stiffnes  RESPIRATORY: No cough, wheezing, shortness of breath  CARDIOVASCULAR: No chest pain, SOB, palpitations, leg swelling  GASTROINTESTINAL: No abdominal or epigastric pain. No nausea, vomiting, or hematemesis;  No melena.  NEUROLOGICAL: No dizziness, no headaches, no memory loss, loss of strength  SKIN: No itching, burning, rashes, or lesions   ENDOCRINE: No heat or cold intolerance  MUSCULOSKELETAL: left hip pain, No leg swelling  PSYCHIATRIC: No depression, anxiety, mood swings, or difficulty sleeping  ALLERGY: No hives, itching, rash          Vital Signs Last 24 Hrs  T(C): 35.6 (01 Oct 2018 07:42), Max: 36.7 (30 Sep 2018 21:45)  T(F): 96.1 (01 Oct 2018 07:42), Max: 98 (30 Sep 2018 21:45)  HR: 75 (01 Oct 2018 07:42) (75 - 102)  BP: 171/73 (01 Oct 2018 07:42) (141/68 - 171/74)  BP(mean): --  RR: 18 (01 Oct 2018 07:42) (18 - 20)  SpO2: 95% (01 Oct 2018 00:00) (95% - 98%)                      PHYSICAL EXAM:  GENERAL: No distress, slender, petite female  HEAD:  Atraumatic, Normocephalic  NECK: Supple, No JVD, No Bruit  NERVOUS SYSTEM:  Alert, Awake, Oriented to time, place, person; Normal memory and speech  CHEST/LUNG: Normal air entry to lung base bilaterally; No wheeze, no crackle, no rales  HEART: Regular heart beat, S1, A2, P2, No S3, No gallop, No murmur  ABDOMEN: Soft, Non tender, Non distended; Bowel sounds present  EXTREMITIES:  2+ Peripheral Pulses, No clubbing, No edema, left leg slightly shorter and deviated due to Fx  SKIN: No rashes or lesions      ECG: < from: 12 Lead ECG (09.30.18 @ 09:35) >   Normal sinus rhythm  Normal ECG    < end of copied text >  initial ECG sinus tachycardia, non significant ST or Tabnormality    I&O's Detail    01 Oct 2018 07:01  -  01 Oct 2018 13:16  --------------------------------------------------------  IN:    Oral Fluid: 480 mL  Total IN: 480 mL    OUT:    Voided: 300 mL  Total OUT: 300 mL    Total NET: 180 mL          LABS:                        12.5   9.35  )-----------( 192      ( 30 Sep 2018 07:25 )             37.7     09-30    137  |  98  |  22<H>  ----------------------------<  158<H>  5.0   |  21  |  0.8    Ca    9.4      30 Sep 2018 07:25  Phos  3.8     09-30  Mg     2.0     09-30    TPro  7.0  /  Alb  4.4  /  TBili  0.4  /  DBili  x   /  AST  23  /  ALT  16  /  AlkPhos  74  09-29        PT/INR - ( 30 Sep 2018 07:25 )   PT: 12.90 sec;   INR: 1.20 ratio         PTT - ( 30 Sep 2018 07:25 )  PTT:28.3 sec    I&O's Summary    01 Oct 2018 07:01  -  01 Oct 2018 13:16  --------------------------------------------------------  IN: 480 mL / OUT: 300 mL / NET: 180 mL        RADIOLOGY & ADDITIONAL STUDIES: < from: Xray Chest 1 View- PORTABLE-Urgent (09.29.18 @ 18:46) >  Mild left basilar atelectasis; otherwise, no radiographic evidence of   acute cardiopulmonary disease.    < end of copied text >  < from: Xray Hip 2-3 Views, Left (09.29.18 @ 18:52) >  Coxa breva with external rotation of the femoral shaft likely reflecting   a left basi-cervical femoral neck fracture. Bilateral femoral heads in   anatomic alignment.    < end of copied text >

## 2018-10-01 NOTE — PROGRESS NOTE ADULT - SUBJECTIVE AND OBJECTIVE BOX
PREOP NOTE      ICU Vital Signs Last 24 Hrs  T(C): 36.1 (01 Oct 2018 00:00), Max: 36.7 (30 Sep 2018 21:45)  T(F): 96.9 (01 Oct 2018 00:00), Max: 98 (30 Sep 2018 21:45)  HR: 101 (01 Oct 2018 04:58) (95 - 115)  BP: 141/68 (01 Oct 2018 04:58) (141/68 - 171/74)  BP(mean): --  ABP: --  ABP(mean): --  RR: 18 (01 Oct 2018 00:00) (18 - 20)  SpO2: 95% (01 Oct 2018 00:00) (95% - 98%)                          12.5   9.35  )-----------( 192      ( 30 Sep 2018 07:25 )             37.7     OR TODAY PENDING CLEARANCE  R hip fx for jony   NPO  bedrest  pain control  2U placed on hold  continue DVT ppx - do not hold PREOP NOTE      ICU Vital Signs Last 24 Hrs  T(C): 36.1 (01 Oct 2018 00:00), Max: 36.7 (30 Sep 2018 21:45)  T(F): 96.9 (01 Oct 2018 00:00), Max: 98 (30 Sep 2018 21:45)  HR: 101 (01 Oct 2018 04:58) (95 - 115)  BP: 141/68 (01 Oct 2018 04:58) (141/68 - 171/74)  BP(mean): --  ABP: --  ABP(mean): --  RR: 18 (01 Oct 2018 00:00) (18 - 20)  SpO2: 95% (01 Oct 2018 00:00) (95% - 98%)                          12.5   9.35  )-----------( 192      ( 30 Sep 2018 07:25 )             37.7     OR TODAY PENDING CLEARANCE  L hip fx for jony   NPO  bedrest  pain control  2U placed on hold  continue DVT ppx - do not hold

## 2018-10-02 ENCOUNTER — RESULT REVIEW (OUTPATIENT)
Age: 83
End: 2018-10-02

## 2018-10-02 LAB
ANION GAP SERPL CALC-SCNC: 14 MMOL/L — SIGNIFICANT CHANGE UP (ref 7–14)
ANION GAP SERPL CALC-SCNC: 15 MMOL/L — HIGH (ref 7–14)
BASOPHILS # BLD AUTO: 0.03 K/UL — SIGNIFICANT CHANGE UP (ref 0–0.2)
BASOPHILS # BLD AUTO: 0.04 K/UL — SIGNIFICANT CHANGE UP (ref 0–0.2)
BASOPHILS NFR BLD AUTO: 0.4 % — SIGNIFICANT CHANGE UP (ref 0–1)
BASOPHILS NFR BLD AUTO: 0.4 % — SIGNIFICANT CHANGE UP (ref 0–1)
BUN SERPL-MCNC: 14 MG/DL — SIGNIFICANT CHANGE UP (ref 10–20)
BUN SERPL-MCNC: 15 MG/DL — SIGNIFICANT CHANGE UP (ref 10–20)
CALCIUM SERPL-MCNC: 8.4 MG/DL — LOW (ref 8.5–10.1)
CALCIUM SERPL-MCNC: 8.8 MG/DL — SIGNIFICANT CHANGE UP (ref 8.5–10.1)
CHLORIDE SERPL-SCNC: 97 MMOL/L — LOW (ref 98–110)
CHLORIDE SERPL-SCNC: 99 MMOL/L — SIGNIFICANT CHANGE UP (ref 98–110)
CO2 SERPL-SCNC: 22 MMOL/L — SIGNIFICANT CHANGE UP (ref 17–32)
CO2 SERPL-SCNC: 24 MMOL/L — SIGNIFICANT CHANGE UP (ref 17–32)
CREAT SERPL-MCNC: 0.7 MG/DL — SIGNIFICANT CHANGE UP (ref 0.7–1.5)
CREAT SERPL-MCNC: 0.8 MG/DL — SIGNIFICANT CHANGE UP (ref 0.7–1.5)
EOSINOPHIL # BLD AUTO: 0.06 K/UL — SIGNIFICANT CHANGE UP (ref 0–0.7)
EOSINOPHIL # BLD AUTO: 0.14 K/UL — SIGNIFICANT CHANGE UP (ref 0–0.7)
EOSINOPHIL NFR BLD AUTO: 0.8 % — SIGNIFICANT CHANGE UP (ref 0–8)
EOSINOPHIL NFR BLD AUTO: 1.5 % — SIGNIFICANT CHANGE UP (ref 0–8)
GLUCOSE BLDC GLUCOMTR-MCNC: 114 MG/DL — HIGH (ref 70–99)
GLUCOSE SERPL-MCNC: 115 MG/DL — HIGH (ref 70–99)
GLUCOSE SERPL-MCNC: 97 MG/DL — SIGNIFICANT CHANGE UP (ref 70–99)
HCT VFR BLD CALC: 34.1 % — LOW (ref 37–47)
HCT VFR BLD CALC: 37.1 % — SIGNIFICANT CHANGE UP (ref 37–47)
HGB BLD-MCNC: 11.3 G/DL — LOW (ref 12–16)
HGB BLD-MCNC: 12.4 G/DL — SIGNIFICANT CHANGE UP (ref 12–16)
IMM GRANULOCYTES NFR BLD AUTO: 0.5 % — HIGH (ref 0.1–0.3)
IMM GRANULOCYTES NFR BLD AUTO: 0.5 % — HIGH (ref 0.1–0.3)
LYMPHOCYTES # BLD AUTO: 0.71 K/UL — LOW (ref 1.2–3.4)
LYMPHOCYTES # BLD AUTO: 2.61 K/UL — SIGNIFICANT CHANGE UP (ref 1.2–3.4)
LYMPHOCYTES # BLD AUTO: 27.8 % — SIGNIFICANT CHANGE UP (ref 20.5–51.1)
LYMPHOCYTES # BLD AUTO: 9.7 % — LOW (ref 20.5–51.1)
MCHC RBC-ENTMCNC: 30 PG — SIGNIFICANT CHANGE UP (ref 27–31)
MCHC RBC-ENTMCNC: 30.1 PG — SIGNIFICANT CHANGE UP (ref 27–31)
MCHC RBC-ENTMCNC: 33.1 G/DL — SIGNIFICANT CHANGE UP (ref 32–37)
MCHC RBC-ENTMCNC: 33.4 G/DL — SIGNIFICANT CHANGE UP (ref 32–37)
MCV RBC AUTO: 90 FL — SIGNIFICANT CHANGE UP (ref 81–99)
MCV RBC AUTO: 90.5 FL — SIGNIFICANT CHANGE UP (ref 81–99)
MONOCYTES # BLD AUTO: 0.38 K/UL — SIGNIFICANT CHANGE UP (ref 0.1–0.6)
MONOCYTES # BLD AUTO: 1.11 K/UL — HIGH (ref 0.1–0.6)
MONOCYTES NFR BLD AUTO: 11.8 % — HIGH (ref 1.7–9.3)
MONOCYTES NFR BLD AUTO: 5.2 % — SIGNIFICANT CHANGE UP (ref 1.7–9.3)
NEUTROPHILS # BLD AUTO: 5.45 K/UL — SIGNIFICANT CHANGE UP (ref 1.4–6.5)
NEUTROPHILS # BLD AUTO: 6.12 K/UL — SIGNIFICANT CHANGE UP (ref 1.4–6.5)
NEUTROPHILS NFR BLD AUTO: 58 % — SIGNIFICANT CHANGE UP (ref 42.2–75.2)
NEUTROPHILS NFR BLD AUTO: 83.4 % — HIGH (ref 42.2–75.2)
NRBC # BLD: 0 /100 WBCS — SIGNIFICANT CHANGE UP (ref 0–0)
PLATELET # BLD AUTO: 129 K/UL — LOW (ref 130–400)
PLATELET # BLD AUTO: 164 K/UL — SIGNIFICANT CHANGE UP (ref 130–400)
POTASSIUM SERPL-MCNC: 3.8 MMOL/L — SIGNIFICANT CHANGE UP (ref 3.5–5)
POTASSIUM SERPL-MCNC: 4.2 MMOL/L — SIGNIFICANT CHANGE UP (ref 3.5–5)
POTASSIUM SERPL-SCNC: 3.8 MMOL/L — SIGNIFICANT CHANGE UP (ref 3.5–5)
POTASSIUM SERPL-SCNC: 4.2 MMOL/L — SIGNIFICANT CHANGE UP (ref 3.5–5)
RBC # BLD: 3.77 M/UL — LOW (ref 4.2–5.4)
RBC # BLD: 4.12 M/UL — LOW (ref 4.2–5.4)
RBC # FLD: 12.4 % — SIGNIFICANT CHANGE UP (ref 11.5–14.5)
RBC # FLD: 12.4 % — SIGNIFICANT CHANGE UP (ref 11.5–14.5)
SODIUM SERPL-SCNC: 135 MMOL/L — SIGNIFICANT CHANGE UP (ref 135–146)
SODIUM SERPL-SCNC: 136 MMOL/L — SIGNIFICANT CHANGE UP (ref 135–146)
WBC # BLD: 7.34 K/UL — SIGNIFICANT CHANGE UP (ref 4.8–10.8)
WBC # BLD: 9.4 K/UL — SIGNIFICANT CHANGE UP (ref 4.8–10.8)
WBC # FLD AUTO: 7.34 K/UL — SIGNIFICANT CHANGE UP (ref 4.8–10.8)
WBC # FLD AUTO: 9.4 K/UL — SIGNIFICANT CHANGE UP (ref 4.8–10.8)

## 2018-10-02 RX ORDER — ATENOLOL 25 MG/1
25 TABLET ORAL DAILY
Qty: 0 | Refills: 0 | Status: DISCONTINUED | OUTPATIENT
Start: 2018-10-02 | End: 2018-10-05

## 2018-10-02 RX ORDER — MORPHINE SULFATE 50 MG/1
4 CAPSULE, EXTENDED RELEASE ORAL EVERY 4 HOURS
Qty: 0 | Refills: 0 | Status: DISCONTINUED | OUTPATIENT
Start: 2018-10-02 | End: 2018-10-05

## 2018-10-02 RX ORDER — ATORVASTATIN CALCIUM 80 MG/1
20 TABLET, FILM COATED ORAL AT BEDTIME
Qty: 0 | Refills: 0 | Status: DISCONTINUED | OUTPATIENT
Start: 2018-10-02 | End: 2018-10-05

## 2018-10-02 RX ORDER — SODIUM CHLORIDE 9 MG/ML
1000 INJECTION INTRAMUSCULAR; INTRAVENOUS; SUBCUTANEOUS
Qty: 0 | Refills: 0 | Status: DISCONTINUED | OUTPATIENT
Start: 2018-10-02 | End: 2018-10-05

## 2018-10-02 RX ORDER — CEFAZOLIN SODIUM 1 G
2000 VIAL (EA) INJECTION EVERY 8 HOURS
Qty: 0 | Refills: 0 | Status: COMPLETED | OUTPATIENT
Start: 2018-10-02 | End: 2018-10-03

## 2018-10-02 RX ORDER — MORPHINE SULFATE 50 MG/1
4 CAPSULE, EXTENDED RELEASE ORAL
Qty: 0 | Refills: 0 | Status: DISCONTINUED | OUTPATIENT
Start: 2018-10-02 | End: 2018-10-03

## 2018-10-02 RX ORDER — AMLODIPINE BESYLATE 2.5 MG/1
5 TABLET ORAL AT BEDTIME
Qty: 0 | Refills: 0 | Status: DISCONTINUED | OUTPATIENT
Start: 2018-10-02 | End: 2018-10-05

## 2018-10-02 RX ORDER — SODIUM CHLORIDE 9 MG/ML
1000 INJECTION, SOLUTION INTRAVENOUS
Qty: 0 | Refills: 0 | Status: DISCONTINUED | OUTPATIENT
Start: 2018-10-02 | End: 2018-10-05

## 2018-10-02 RX ORDER — HEPARIN SODIUM 5000 [USP'U]/ML
5000 INJECTION INTRAVENOUS; SUBCUTANEOUS EVERY 8 HOURS
Qty: 0 | Refills: 0 | Status: DISCONTINUED | OUTPATIENT
Start: 2018-10-02 | End: 2018-10-05

## 2018-10-02 RX ORDER — MEPERIDINE HYDROCHLORIDE 50 MG/ML
12.5 INJECTION INTRAMUSCULAR; INTRAVENOUS; SUBCUTANEOUS
Qty: 0 | Refills: 0 | Status: DISCONTINUED | OUTPATIENT
Start: 2018-10-02 | End: 2018-10-03

## 2018-10-02 RX ORDER — OXYCODONE HYDROCHLORIDE 5 MG/1
10 TABLET ORAL EVERY 4 HOURS
Qty: 0 | Refills: 0 | Status: DISCONTINUED | OUTPATIENT
Start: 2018-10-02 | End: 2018-10-05

## 2018-10-02 RX ORDER — ONDANSETRON 8 MG/1
4 TABLET, FILM COATED ORAL ONCE
Qty: 0 | Refills: 0 | Status: COMPLETED | OUTPATIENT
Start: 2018-10-02 | End: 2018-10-02

## 2018-10-02 RX ORDER — MORPHINE SULFATE 50 MG/1
2 CAPSULE, EXTENDED RELEASE ORAL
Qty: 0 | Refills: 0 | Status: DISCONTINUED | OUTPATIENT
Start: 2018-10-02 | End: 2018-10-03

## 2018-10-02 RX ORDER — OXYCODONE HYDROCHLORIDE 5 MG/1
5 TABLET ORAL EVERY 4 HOURS
Qty: 0 | Refills: 0 | Status: DISCONTINUED | OUTPATIENT
Start: 2018-10-02 | End: 2018-10-05

## 2018-10-02 RX ORDER — ONDANSETRON 8 MG/1
4 TABLET, FILM COATED ORAL EVERY 6 HOURS
Qty: 0 | Refills: 0 | Status: DISCONTINUED | OUTPATIENT
Start: 2018-10-02 | End: 2018-10-05

## 2018-10-02 RX ORDER — TRAMADOL HYDROCHLORIDE 50 MG/1
50 TABLET ORAL THREE TIMES A DAY
Qty: 0 | Refills: 0 | Status: DISCONTINUED | OUTPATIENT
Start: 2018-10-02 | End: 2018-10-05

## 2018-10-02 RX ADMIN — HEPARIN SODIUM 5000 UNIT(S): 5000 INJECTION INTRAVENOUS; SUBCUTANEOUS at 22:18

## 2018-10-02 RX ADMIN — ONDANSETRON 4 MILLIGRAM(S): 8 TABLET, FILM COATED ORAL at 23:25

## 2018-10-02 RX ADMIN — Medication 10 MILLIGRAM(S): at 05:25

## 2018-10-02 RX ADMIN — TRAMADOL HYDROCHLORIDE 50 MILLIGRAM(S): 50 TABLET ORAL at 05:29

## 2018-10-02 RX ADMIN — ATENOLOL 25 MILLIGRAM(S): 25 TABLET ORAL at 05:25

## 2018-10-02 RX ADMIN — HEPARIN SODIUM 5000 UNIT(S): 5000 INJECTION INTRAVENOUS; SUBCUTANEOUS at 14:22

## 2018-10-02 RX ADMIN — SODIUM CHLORIDE 75 MILLILITER(S): 9 INJECTION INTRAMUSCULAR; INTRAVENOUS; SUBCUTANEOUS at 22:14

## 2018-10-02 RX ADMIN — HEPARIN SODIUM 5000 UNIT(S): 5000 INJECTION INTRAVENOUS; SUBCUTANEOUS at 05:25

## 2018-10-02 RX ADMIN — TRAMADOL HYDROCHLORIDE 50 MILLIGRAM(S): 50 TABLET ORAL at 14:22

## 2018-10-02 NOTE — BRIEF OPERATIVE NOTE - PROCEDURE
<<-----Click on this checkbox to enter Procedure Hemiarthroplasty of hip by anterior approach  10/02/2018    Active  JHIPFLORES

## 2018-10-02 NOTE — PROGRESS NOTE ADULT - SUBJECTIVE AND OBJECTIVE BOX
ART LYONS  89y  Female      Patient is a 89y old  Female who presents with a chief complaint of Fall from standing (01 Oct 2018 18:22), she sustained a L femoral neck fx and is awaiting surgical repair which has been postponed until today due to scheduling.  She has no c/o SOB or CP        INTERVAL HPI/OVERNIGHT EVENTS:  No overnight events, pt was NPO all of yesterday but had a small egg salad last night when it was determined surgery would be rescheduled until today.    HEALTH ISSUES - PROBLEM Dx:    Dyslipidemia    Essential hypertension    Lung nodule    Osteoporosis.            Vital Signs Last 24 Hrs  T(C): 36.6 (02 Oct 2018 07:42), Max: 36.6 (02 Oct 2018 00:27)  T(F): 97.9 (02 Oct 2018 07:42), Max: 97.9 (02 Oct 2018 07:42)  HR: 74 (02 Oct 2018 07:42) (74 - 89)  BP: 143/63 (02 Oct 2018 07:42) (110/58 - 143/63)  BP(mean): --  RR: 18 (02 Oct 2018 07:42) (18 - 18)  SpO2: 95% (02 Oct 2018 00:27) (95% - 95%)    amLODIPine   Tablet 5 milliGRAM(s) Oral at bedtime  ATENolol  Tablet 25 milliGRAM(s) Oral daily  atorvastatin 20 milliGRAM(s) Oral at bedtime  enalapril 10 milliGRAM(s) Oral daily  heparin  Injectable 5000 Unit(s) SubCutaneous every 8 hours  influenza   Vaccine 0.5 milliLiter(s) IntraMuscular once  ondansetron    Tablet 4 milliGRAM(s) Oral every 6 hours PRN  sodium chloride 0.9%. 1000 milliLiter(s) IV Continuous <Continuous>  traMADol 50 milliGRAM(s) Oral three times a day      PHYSICAL EXAM:  GENERAL: NAD, well-groomed, well-developed  HEAD:  Atraumatic, Normocephalic  EYES: EOMI, PERRLA, conjunctiva and sclera clear  HEENT: No tonsillar erythema, exudates, or enlargement; Dry mucous membranes, Good dentition, No lesions  NECK: Supple, No JVD, Normal thyroid  NERVOUS SYSTEM:  Alert & Oriented X3, Good concentration; Motor Strength 5/5 B/L upper and lower extremities; DTRs 2+ intact and symmetric  CHEST/LUNG: Clear to percussion bilaterally; No rales, rhonchi, wheezing, or rubs  HEART: Regular rate and rhythm; No murmurs, rubs, or gallops  ABDOMEN: Soft, Nontender, Nondistended; Bowel sounds present  EXTREMITIES:  2+ Peripheral Pulses, No clubbing, cyanosis, or edema  LYMPH: No lymphadenopathy noted  SKIN: No rashes or lesions      LABS                         12.4   9.40  )-----------( 164      ( 02 Oct 2018 05:45 )             37.1     10-02    135  |  97<L>  |  14  ----------------------------<  97  4.2   |  24  |  0.7    Ca    8.8      02 Oct 2018 05:45        RADIOLOGY & ADDITIONAL TESTS:    ASSESSMENT & PLAN:   · Assessment		  89/F hx of HTN, p/w fall from standing after loss of balance. Found to have left femoral neck fx. Admitted to medicine for Left femoral neck fx and is still awaiting surgery.    # Left femoral neck hip fracture  - Seen by Ortho - planned for surgery 10/1, but rescheduled for today.  Had neg cardio evaluation, moderate surgical risk due age.  - NWB Left  - 2unit PRBC on hold - please order once T&S is completed  - PRN analgesics      # HTN  - C/w atenolol, ACEi, CCB, BP OK, check post op EKG    # DLD - c/w statin    # CKD IIIBI? w/Low Bicarb  - on IV hydration as NPO  - Check UA & Ur protein / Cr ratio  - No hypotension    # Lung Nodule  - Followed with pulm (dr. Barraza) - was advised no longer needs to follows since nodule was stable over 2 yrs    DVT ppx - heparin  NWB left  DASH diet  No GI ppx    Full Code

## 2018-10-02 NOTE — PROGRESS NOTE ADULT - ASSESSMENT
________________________________________________________________________________  DAILY PROGRESS NOTE:    ================== SUBJECTIVE ==================    ART LYONS  /   89y  /  Female  /  MRN#: [id number]  Patient is a 89y old Female who presents with a chief complaint of Fall from standing (02 Oct 2018 08:15)  Currently admitted to medicine with the primary diagnosis of Femur neck fracture    HOSPITAL DAY: 3d     OVERNIGHT EVENTS: None    TODAY: Patient was seen this morning at bedside. Currently, the patient reports no complaints     Review of systems is otherwise negative.    =================== OBJECTIVE ===================    VITAL SIGNS: Last 24 Hours  T(C): 36.6 (02 Oct 2018 07:42), Max: 36.6 (02 Oct 2018 00:27)  T(F): 97.9 (02 Oct 2018 07:42), Max: 97.9 (02 Oct 2018 07:42)  HR: 74 (02 Oct 2018 07:42) (74 - 89)  BP: 143/63 (02 Oct 2018 07:42) (110/58 - 143/63)  BP(mean): --  RR: 18 (02 Oct 2018 07:42) (18 - 18)  SpO2: 95% (02 Oct 2018 00:27) (95% - 95%)    10-01-18 @ 07:01  -  10-02-18 @ 07:00  --------------------------------------------------------  IN: 480 mL / OUT: 500 mL / NET: -20 mL    10-02-18 @ 07:01  -  10-02-18 @ 11:16  --------------------------------------------------------  IN: 0 mL / OUT: 0 mL / NET: 0 mL      GENERAL: NAD, well-groomed, well-developed  NERVOUS SYSTEM:  Alert & Oriented X3, Good concentration; Motor Strength 5/5 B/L upper and lower extremities; DTRs 2+ intact and symmetric  CHEST/LUNG: Clear to percussion bilaterally; No rales, rhonchi, wheezing, or rubs  HEART: Regular rate and rhythm; No murmurs, rubs, or gallops  ABDOMEN: Soft, Nontender, Nondistended; Bowel sounds present  EXTREMITIES:  2+ Peripheral Pulses, No clubbing, cyanosis, or edema, pain L hip  SKIN: No rashes or lesions    ===================== LABS =====================                        12.4   9.40  )-----------( 164      ( 02 Oct 2018 05:45 )             37.1     10-02    135  |  97<L>  |  14  ----------------------------<  97  4.2   |  24  |  0.7    Ca    8.8      02 Oct 2018 05:45    ================== ALLERGIES ===================  No Known Allergies    ==================== MEDS =====================  amLODIPine   Tablet 5 milliGRAM(s) Oral at bedtime  ATENolol  Tablet 25 milliGRAM(s) Oral daily  atorvastatin 20 milliGRAM(s) Oral at bedtime  enalapril 10 milliGRAM(s) Oral daily  heparin  Injectable 5000 Unit(s) SubCutaneous every 8 hours  influenza   Vaccine 0.5 milliLiter(s) IntraMuscular once  sodium chloride 0.9%. 1000 milliLiter(s) IV Continuous <Continuous>  traMADol 50 milliGRAM(s) Oral three times a day    PRN MEDICATIONS  ondansetron    Tablet 4 milliGRAM(s) Oral every 6 hours PRN    ================= ASSESS/PLAN ==================  Patient is a 89y old Female who presents with a chief complaint of Fall from standing (02 Oct 2018 08:15)  Currently admitted to medicine with the primary diagnosis of Femur neck fracture      PLAN  # Left femoral neck hip fracture  - Seen by Ortho - planned for surgery today, seen by cardiology   - NWB Left  - 2unit PRBC on hold   - PRN analgesics  - As per cardiology   moderate risk patient (age) and surgery (orthopedics surgery), functional capacity at limited side, less than 4 METS, generally by accepting a moderate risk she can have the surgery done, she is already on beta blocker and statin  if bp remains high or HR stays above 90 then increase Atenolol to 50 mg daily  post op care as per the events     # HTN  - C/w atenolol, ACEi, CCB, BP OK    # DLD - c/w statin    # CKD IIIBI? w/Low Bicarb  - Give 1L IVF, repeat BMP  - Check UA & Ur protein / Cr ratio  - No hypotension    # Lung Nodule  - Followed with pulm (dr. Ross) - was advised no longer needs to follows since nodule was stable over 2 yrs      GI PPX: -  DVT PPX: Heparin SQ    DIET:  (X) Regular  /  () Cardiac  / () Renal  /  () Diabetic  /  () Fluid restriction  /   () NPO  ACTIVITY:  () Ad Mandi  /  (X) Advance as Tolerated  /  () Bed Rest  /  () Fall Precaution  /  () Seizure precaution    ================= PRESENT TODAY ==================    Gaviria Catheter (X) No / () Yes   Indication:  Central Line (X) No / ()Yes  Indication:  IV Fluids (X) No / ()Yes  Indication:     ================= DISPOSITION ==================    Patient to be discharged when condition(s) optimized.            Discharge to:  (X) Home  /  () SNF  /  () Hospice  /  () Other            Home services:  () Home health  /  () Rehab  /  () IV Abx  /  () Education  /  () None            Counseled on/Discussed cessation of:  () Risky behaviors  /  () Smoking cessation  /  () Diet  /  () Exercise  /  () Other    ================= CODE STATUS =================                  (X) FULL CODE     |     () DNR     |     () DNI

## 2018-10-02 NOTE — CHART NOTE - NSCHARTNOTEFT_GEN_A_CORE
PACU ANESTHESIA ADMISSION NOTE      Procedure: Hemiarthroplasty of hip by anterior approach    Post op diagnosis:  Closed fracture of neck of left femur, initial encounter      ____  Intubated  TV:______       Rate: ______      FiO2: ______    __x__  Patent Airway    ____  Full return of protective reflexes    __x__  Full recovery from anesthesia / back to baseline status    Vitals:  T(F): 97.6  HR: 76  BP: 113/54  RR: 16  SpO2: 100%    Mental Status:  ___x_ Awake   ___x__ Alert   _____ Drowsy   _____ Sedated    Nausea/Vomiting:  __x__ NO  ______Yes,   See Post - Op Orders          Pain Scale (0-10):  _____    Treatment: ____ None    ___x_ See Post - Op/PCA Orders    Post - Operative Fluids:   ____ Oral   _x___ See Post - Op Orders    Plan: Discharge:   ____Home       _x____Floor     _____Critical Care    _____  Other:_________________    Comments: patient tolerated procedure  transfer to PACU  No anesthesia complications

## 2018-10-03 LAB
ANION GAP SERPL CALC-SCNC: 17 MMOL/L — HIGH (ref 7–14)
BASOPHILS # BLD AUTO: 0.01 K/UL — SIGNIFICANT CHANGE UP (ref 0–0.2)
BASOPHILS NFR BLD AUTO: 0.1 % — SIGNIFICANT CHANGE UP (ref 0–1)
BUN SERPL-MCNC: 16 MG/DL — SIGNIFICANT CHANGE UP (ref 10–20)
CALCIUM SERPL-MCNC: 8.8 MG/DL — SIGNIFICANT CHANGE UP (ref 8.5–10.1)
CHLORIDE SERPL-SCNC: 101 MMOL/L — SIGNIFICANT CHANGE UP (ref 98–110)
CO2 SERPL-SCNC: 21 MMOL/L — SIGNIFICANT CHANGE UP (ref 17–32)
CREAT SERPL-MCNC: 0.9 MG/DL — SIGNIFICANT CHANGE UP (ref 0.7–1.5)
EOSINOPHIL # BLD AUTO: 0 K/UL — SIGNIFICANT CHANGE UP (ref 0–0.7)
EOSINOPHIL NFR BLD AUTO: 0 % — SIGNIFICANT CHANGE UP (ref 0–8)
GLUCOSE SERPL-MCNC: 171 MG/DL — HIGH (ref 70–99)
HCT VFR BLD CALC: 37.6 % — SIGNIFICANT CHANGE UP (ref 37–47)
HGB BLD-MCNC: 12.3 G/DL — SIGNIFICANT CHANGE UP (ref 12–16)
IMM GRANULOCYTES NFR BLD AUTO: 0.5 % — HIGH (ref 0.1–0.3)
LYMPHOCYTES # BLD AUTO: 0.51 K/UL — LOW (ref 1.2–3.4)
LYMPHOCYTES # BLD AUTO: 6.1 % — LOW (ref 20.5–51.1)
MCHC RBC-ENTMCNC: 29.5 PG — SIGNIFICANT CHANGE UP (ref 27–31)
MCHC RBC-ENTMCNC: 32.7 G/DL — SIGNIFICANT CHANGE UP (ref 32–37)
MCV RBC AUTO: 90.2 FL — SIGNIFICANT CHANGE UP (ref 81–99)
MONOCYTES # BLD AUTO: 0.27 K/UL — SIGNIFICANT CHANGE UP (ref 0.1–0.6)
MONOCYTES NFR BLD AUTO: 3.2 % — SIGNIFICANT CHANGE UP (ref 1.7–9.3)
NEUTROPHILS # BLD AUTO: 7.57 K/UL — HIGH (ref 1.4–6.5)
NEUTROPHILS NFR BLD AUTO: 90.1 % — HIGH (ref 42.2–75.2)
PLATELET # BLD AUTO: 129 K/UL — LOW (ref 130–400)
POTASSIUM SERPL-MCNC: 4.8 MMOL/L — SIGNIFICANT CHANGE UP (ref 3.5–5)
POTASSIUM SERPL-SCNC: 4.8 MMOL/L — SIGNIFICANT CHANGE UP (ref 3.5–5)
RBC # BLD: 4.17 M/UL — LOW (ref 4.2–5.4)
RBC # FLD: 12.3 % — SIGNIFICANT CHANGE UP (ref 11.5–14.5)
SODIUM SERPL-SCNC: 139 MMOL/L — SIGNIFICANT CHANGE UP (ref 135–146)
WBC # BLD: 8.4 K/UL — SIGNIFICANT CHANGE UP (ref 4.8–10.8)
WBC # FLD AUTO: 8.4 K/UL — SIGNIFICANT CHANGE UP (ref 4.8–10.8)

## 2018-10-03 RX ORDER — CHLORHEXIDINE GLUCONATE 213 G/1000ML
1 SOLUTION TOPICAL
Qty: 0 | Refills: 0 | Status: DISCONTINUED | OUTPATIENT
Start: 2018-10-03 | End: 2018-10-05

## 2018-10-03 RX ADMIN — TRAMADOL HYDROCHLORIDE 50 MILLIGRAM(S): 50 TABLET ORAL at 21:47

## 2018-10-03 RX ADMIN — Medication 10 MILLIGRAM(S): at 05:53

## 2018-10-03 RX ADMIN — ATENOLOL 25 MILLIGRAM(S): 25 TABLET ORAL at 05:53

## 2018-10-03 RX ADMIN — TRAMADOL HYDROCHLORIDE 50 MILLIGRAM(S): 50 TABLET ORAL at 15:05

## 2018-10-03 RX ADMIN — HEPARIN SODIUM 5000 UNIT(S): 5000 INJECTION INTRAVENOUS; SUBCUTANEOUS at 15:05

## 2018-10-03 RX ADMIN — TRAMADOL HYDROCHLORIDE 50 MILLIGRAM(S): 50 TABLET ORAL at 15:29

## 2018-10-03 RX ADMIN — ATORVASTATIN CALCIUM 20 MILLIGRAM(S): 80 TABLET, FILM COATED ORAL at 21:41

## 2018-10-03 RX ADMIN — AMLODIPINE BESYLATE 5 MILLIGRAM(S): 2.5 TABLET ORAL at 21:41

## 2018-10-03 RX ADMIN — HEPARIN SODIUM 5000 UNIT(S): 5000 INJECTION INTRAVENOUS; SUBCUTANEOUS at 05:56

## 2018-10-03 RX ADMIN — TRAMADOL HYDROCHLORIDE 50 MILLIGRAM(S): 50 TABLET ORAL at 05:56

## 2018-10-03 RX ADMIN — HEPARIN SODIUM 5000 UNIT(S): 5000 INJECTION INTRAVENOUS; SUBCUTANEOUS at 21:42

## 2018-10-03 RX ADMIN — Medication 100 MILLIGRAM(S): at 04:36

## 2018-10-03 RX ADMIN — Medication 100 MILLIGRAM(S): at 12:20

## 2018-10-03 NOTE — PROGRESS NOTE ADULT - SUBJECTIVE AND OBJECTIVE BOX
ORTHO POST OP NOTE    Procedure: l Hemiarthroplasty of hip by anterior approach      Patient seen and examined at bedside. No acute events since OR. Resting without complaints. Pain controlled with medication. Denies chest pain, SOB, N/V.    T(C): 36.6 (10-03-18 @ 02:00), Max: 36.7 (10-02-18 @ 15:30)  HR: 78 (10-03-18 @ 02:00) (74 - 90)  BP: 125/26 (10-03-18 @ 02:00) (113/54 - 160/81)  RR: 18 (10-03-18 @ 02:00) (14 - 18)  SpO2: 100% (10-03-18 @ 02:00) (95% - 100%)  Wt(kg): --    Exam:  Alert and Stanton, No Acute Distress    LLE  Dressing  C/D/I  Compartments soft/compressible  Calves soft  EHL/FHL Motor intact  SILT distally  Ext wwp                           11.3<L>  7.34  )-----------( 129<L>    ( 02 Oct 2018 21:25 )             34.1<L>     10-02    136  |  99  |  15  ----------------------------<  115<H>  3.8   |  22  |  0.8            A/P: 89yF S/p L Hemiarthroplasty of hip      -Periop antibiotics  -PT/OT  -WBAT  -OOBTC  -F/U AM Labs  -DVT PPx  -Pain Control  -SCDs  -IS  -Continue Current Tx  -Dispo planning

## 2018-10-03 NOTE — OCCUPATIONAL THERAPY INITIAL EVALUATION ADULT - LEVEL OF INDEPENDENCE: SIT/SUPINE, REHAB EVAL
A letter has been mailed to the patient regarding a referral to see a Sleep phys.  
moderate assist (50% patients effort)

## 2018-10-03 NOTE — CONSULT NOTE ADULT - SUBJECTIVE AND OBJECTIVE BOX
HPI:  89/F hx of HTN, p/w fall from standing after loss of balance.    On day of presentation pt fell from one step and landed on left side. She reports she lost her balance and fell to her left side and had pain to left side. Pt was unable to stand, has left hip pain radiating down left leg. Neighbors that live below heard the fall and came to help her. No other injuries, no LOC, no chest pain, no SOB. No palpatations, no chest pain, no sob.    Pt is active at home, drives, lives alone. (30 Sep 2018 01:35). s/p left hip hemiarthroplasty on 10/2 for basicervical femoral neck fx.      PAST MEDICAL & SURGICAL HISTORY:  Lung nodule  Osteoporosis  Dyslipidemia  Essential hypertension  H/O lumpectomy: 2015, right, pt states benign pathology      Hospital Course:    TODAY'S SUBJECTIVE & REVIEW OF SYMPTOMS:     Constitutional WNL   Cardio WNL   Resp WNL   GI WNL  Heme WNL  Endo WNL  Skin WNL  MSK hip pain  Neuro WNL  Cognitive WNL  Psych WNL      MEDICATIONS  (STANDING):  amLODIPine   Tablet 5 milliGRAM(s) Oral at bedtime  ATENolol  Tablet 25 milliGRAM(s) Oral daily  atorvastatin 20 milliGRAM(s) Oral at bedtime  ceFAZolin   IVPB 2000 milliGRAM(s) IV Intermittent every 8 hours  enalapril 10 milliGRAM(s) Oral daily  heparin  Injectable 5000 Unit(s) SubCutaneous every 8 hours  influenza   Vaccine 0.5 milliLiter(s) IntraMuscular once  lactated ringers. 1000 milliLiter(s) (125 mL/Hr) IV Continuous <Continuous>  sodium chloride 0.9%. 1000 milliLiter(s) (75 mL/Hr) IV Continuous <Continuous>  traMADol 50 milliGRAM(s) Oral three times a day    MEDICATIONS  (PRN):  morphine  - Injectable 4 milliGRAM(s) SubCutaneous every 4 hours PRN Severe Pain (7 - 10)  ondansetron    Tablet 4 milliGRAM(s) Oral every 6 hours PRN Nausea and/or Vomiting  oxyCODONE    IR 10 milliGRAM(s) Oral every 4 hours PRN Moderate Pain (4 - 6)  oxyCODONE    IR 5 milliGRAM(s) Oral every 4 hours PRN Mild Pain (1 - 3)      FAMILY HISTORY:  No pertinent family history in first degree relatives      Allergies    No Known Allergies    Intolerances        SOCIAL HISTORY:    [  ] Etoh  [  ] Smoking  [  ] Substance abuse     Home Environment:  [x  ] Home Alone  [  ] Lives with Family  [  ] Home Health Aid    Dwelling:  [  ] Apartment  [ x ] Private House  [  ] Adult Home  [  ] Skilled Nursing Facility      [  ] Short Term  [  ] Long Term  [ x ] Stairs       Elevator [  ]    FUNCTIONAL STATUS PTA: (Check all that apply)  Ambulation: [x   ]Independent    [  ] Dependent     [  ] Non-Ambulatory  Assistive Device: [  ] SA Cane  [  ]  Q Cane  [  ] Walker  [  ]  Wheelchair  ADL : [ x ] Independent  [  ]  Dependent       Vital Signs Last 24 Hrs  T(C): 35.8 (03 Oct 2018 07:36), Max: 36.7 (02 Oct 2018 15:30)  T(F): 96.5 (03 Oct 2018 07:36), Max: 98 (02 Oct 2018 15:30)  HR: 78 (03 Oct 2018 07:36) (78 - 90)  BP: 174/78 (03 Oct 2018 07:36) (113/54 - 174/78)  BP(mean): --  RR: 18 (03 Oct 2018 07:36) (14 - 18)  SpO2: 100% (03 Oct 2018 02:00) (95% - 100%)      PHYSICAL EXAM: Alert & Oriented X3  GENERAL: NAD, well-groomed, well-developed  HEAD:  Atraumatic, Normocephalic  CHEST/LUNG: Clear   HEART: S1S2+  ABDOMEN: Soft, Nontender  EXTREMITIES: no calf tenderness    NERVOUS SYSTEM:  Cranial Nerves 2-12 intact [  ] Abnormal  [  ]  ROM: WFL all extremities [  ]  Abnormal [x  ]limited lle  Motor Strength: WFL all extremities  [  ]  Abnormal [ x ]limited lle  Sensation: intact to light touch [  ] Abnormal [  ]  Reflexes: Symmetric [  ]  Abnormal [  ]    FUNCTIONAL STATUS:  Bed Mobility: Independent [  ]  Supervision [  ]  Needs Assistance [ x ]  N/A [  ]  Transfers: Independent [  ]  Supervision [  ]  Needs Assistance [x  ]  N/A [  ]   Ambulation: Independent [  ]  Supervision [  ]  Needs Assistance [  ]  N/A [  ]  ADL: Independent [  ] Requires Assistance [  ] N/A [  ]      LABS:                        12.3   8.40  )-----------( 129      ( 03 Oct 2018 05:51 )             37.6     10-03    139  |  101  |  16  ----------------------------<  171<H>  4.8   |  21  |  0.9    Ca    8.8      03 Oct 2018 05:51            RADIOLOGY & ADDITIONAL STUDIES:    Assesment:

## 2018-10-03 NOTE — PROGRESS NOTE ADULT - ASSESSMENT
________________________________________________________________________________  DAILY PROGRESS NOTE:    ================== SUBJECTIVE ==================    ART LYONS  /   89y  /  Female  /  MRN#: [id number]  Patient is a 89y old Female who presents with a chief complaint of Fall from standing (03 Oct 2018 09:31)  Currently admitted to medicine with the primary diagnosis of Femur neck fracture    HOSPITAL DAY: 4d     OVERNIGHT EVENTS: None    TODAY: Patient was seen this morning at bedside. Currently, the patient reports no complaints     Review of systems is otherwise negative.    =================== OBJECTIVE ===================    VITAL SIGNS: Last 24 Hours  T(C): 35.8 (03 Oct 2018 07:36), Max: 36.7 (02 Oct 2018 15:30)  T(F): 96.5 (03 Oct 2018 07:36), Max: 98 (02 Oct 2018 15:30)  HR: 78 (03 Oct 2018 07:36) (78 - 90)  BP: 174/78 (03 Oct 2018 07:36) (113/54 - 174/78)  BP(mean): --  RR: 18 (03 Oct 2018 07:36) (14 - 18)  SpO2: 100% (03 Oct 2018 02:00) (95% - 100%)    10-02-18 @ 07:01  -  10-03-18 @ 07:00  --------------------------------------------------------  IN: 300 mL / OUT: 200 mL / NET: 100 mL    10-03-18 @ 07:01  -  10-03-18 @ 10:41  --------------------------------------------------------  IN: 110 mL / OUT: 0 mL / NET: 110 mL    GENERAL: NAD  NERVOUS SYSTEM:  Alert & Oriented X3, Good concentration; Motor Strength 5/5 B/L upper and lower extremities; DTRs 2+ intact and symmetric  CHEST/LUNG: Clear to percussion bilaterally; No rales, rhonchi, wheezing, or rubs  HEART: Regular rate and rhythm; No murmurs, rubs, or gallops  ABDOMEN: Soft, Nontender, Nondistended; Bowel sounds present  EXTREMITIES:  2+ Peripheral Pulses, No clubbing, cyanosis, or edema, pain L hip  SKIN: No rashes or lesions    ===================== LABS =====================                        12.3   8.40  )-----------( 129      ( 03 Oct 2018 05:51 )             37.6     10-03    139  |  101  |  16  ----------------------------<  171<H>  4.8   |  21  |  0.9    Ca    8.8      03 Oct 2018 05:51    ================== ALLERGIES ===================  No Known Allergies    ==================== MEDS =====================  amLODIPine   Tablet 5 milliGRAM(s) Oral at bedtime  ATENolol  Tablet 25 milliGRAM(s) Oral daily  atorvastatin 20 milliGRAM(s) Oral at bedtime  ceFAZolin   IVPB 2000 milliGRAM(s) IV Intermittent every 8 hours  chlorhexidine 4% Liquid 1 Application(s) Topical <User Schedule>  enalapril 10 milliGRAM(s) Oral daily  heparin  Injectable 5000 Unit(s) SubCutaneous every 8 hours  influenza   Vaccine 0.5 milliLiter(s) IntraMuscular once  lactated ringers. 1000 milliLiter(s) IV Continuous <Continuous>  sodium chloride 0.9%. 1000 milliLiter(s) IV Continuous <Continuous>  traMADol 50 milliGRAM(s) Oral three times a day    PRN MEDICATIONS  morphine  - Injectable 4 milliGRAM(s) SubCutaneous every 4 hours PRN  ondansetron    Tablet 4 milliGRAM(s) Oral every 6 hours PRN  oxyCODONE    IR 10 milliGRAM(s) Oral every 4 hours PRN  oxyCODONE    IR 5 milliGRAM(s) Oral every 4 hours PRN    ================= ASSESS/PLAN ==================  Patient is a 89y old Female who presents with a chief complaint of Fall from standing (03 Oct 2018 09:31)  Currently admitted to medicine with the primary diagnosis of Femur neck fracture      PLAN  # Left femoral neck hip fracture  - Seen by Ortho - s/p surgery day 1  - Hemodynamically stable, Hb stable   - Seen by physiatry: good candidate for 4A rehab    - NWB Left   - PRN analgesics  - As per cardiology   moderate risk patient (age) and surgery (orthopedics surgery), functional capacity at limited side, less than 4 METS, generally by accepting a moderate risk she can have the surgery done, she is already on beta blocker and statin  if bp remains high or HR stays above 90 then increase Atenolol to 50 mg daily  post op care as per the events     # HTN  - C/w atenolol, ACEi, CCB, BP OK    # DLD - c/w statin    # CKD IIIBI? w/Low Bicarb  - Give 1L IVF, repeat BMP  - Check UA & Ur protein / Cr ratio  - No hypotension    # Lung Nodule  - Followed with pulm (dr. Ross) - was advised no longer needs to follows since nodule was stable over 2 yrs      GI PPX: -  DVT PPX: Heparin SQ    DIET:  (X) Regular  /  () Cardiac  / () Renal  /  () Diabetic  /  () Fluid restriction  /   () NPO  ACTIVITY:  () Ad Mandi  /  (X) Advance as Tolerated  /  () Bed Rest  /  () Fall Precaution  /  () Seizure precaution    ================= PRESENT TODAY ==================    Gaviria Catheter (X) No / () Yes   Indication:  Central Line (X) No / ()Yes  Indication:  IV Fluids (X) No / ()Yes  Indication:    ================= DISPOSITION ==================    Patient to be discharged when condition(s) optimized.            Discharge to:  () Home  /  () SNF  /  () Hospice  /  (X) 4A rehab             Home services:  () Home health  /  () Rehab  /  () IV Abx  /  () Education  /  () None    ================= CODE STATUS =================                  (X) FULL CODE     |     () DNR     |     () DNI

## 2018-10-03 NOTE — PROGRESS NOTE ADULT - SUBJECTIVE AND OBJECTIVE BOX
ART LYONS  89y  Female    Patient is a 89y old  Female who presents with a chief complaint of Fall from standing (03 Oct 2018 06:17)      INTERVAL HPI/OVERNIGHT EVENTS: L Hemiarthroplasty    T(C): 35.8 (10-03-18 @ 07:36), Max: 36.7 (10-02-18 @ 15:30)  HR: 78 (10-03-18 @ 07:36) (78 - 90)  BP: 174/78 (10-03-18 @ 07:36) (113/54 - 174/78)  RR: 18 (10-03-18 @ 07:36) (14 - 18)  SpO2: 100% (10-03-18 @ 02:00) (95% - 100%)  Wt(kg): --Vital Signs Last 24 Hrs  T(C): 35.8 (03 Oct 2018 07:36), Max: 36.7 (02 Oct 2018 15:30)  T(F): 96.5 (03 Oct 2018 07:36), Max: 98 (02 Oct 2018 15:30)  HR: 78 (03 Oct 2018 07:36) (78 - 90)  BP: 174/78 (03 Oct 2018 07:36) (113/54 - 174/78)  BP(mean): --  RR: 18 (03 Oct 2018 07:36) (14 - 18)  SpO2: 100% (03 Oct 2018 02:00) (95% - 100%)    PHYSICAL EXAM:  GENERAL: NAD, well-groomed, well-developed  HEAD:  Atraumatic, Normocephalic  NECK: Supple, No JVD, Normal thyroid  NERVOUS SYSTEM:  Alert & Oriented X3, Good concentration; Motor Strength 5/5 B/L upper and lower extremities; DTRs 2+ intact and symmetric  CHEST/LUNG: Clear to percussion bilaterally; No rales, rhonchi, wheezing, or rubs  HEART: Regular rate and rhythm; No murmurs, rubs, or gallops  ABDOMEN: Soft, Nontender, Nondistended; Bowel sounds present  EXTREMITIES:  2+ Peripheral Pulses, No clubbing, cyanosis, or edema    Consultant(s) Notes Reviewed:  [x ] YES  [ ] NO    Discussed with Consultants/Other Providers/Residents [ x] YES     LABS                         12.3   8.40  )-----------( 129      ( 03 Oct 2018 05:51 )             37.6     10-03    139  |  101  |  16  ----------------------------<  171<H>  4.8   |  21  |  0.9    Ca    8.8      03 Oct 2018 05:51              CAPILLARY BLOOD GLUCOSE      POCT Blood Glucose.: 114 mg/dL (02 Oct 2018 21:36)        RADIOLOGY & ADDITIONAL TESTS:    Imaging Personally Reviewed:  [x ] YES  [ ] NO    MEDICATIONS  (STANDING):  amLODIPine   Tablet 5 milliGRAM(s) Oral at bedtime  ATENolol  Tablet 25 milliGRAM(s) Oral daily  atorvastatin 20 milliGRAM(s) Oral at bedtime  ceFAZolin   IVPB 2000 milliGRAM(s) IV Intermittent every 8 hours  enalapril 10 milliGRAM(s) Oral daily  heparin  Injectable 5000 Unit(s) SubCutaneous every 8 hours  influenza   Vaccine 0.5 milliLiter(s) IntraMuscular once  lactated ringers. 1000 milliLiter(s) (125 mL/Hr) IV Continuous <Continuous>  sodium chloride 0.9%. 1000 milliLiter(s) (75 mL/Hr) IV Continuous <Continuous>  traMADol 50 milliGRAM(s) Oral three times a day    MEDICATIONS  (PRN):  morphine  - Injectable 4 milliGRAM(s) SubCutaneous every 4 hours PRN Severe Pain (7 - 10)  ondansetron    Tablet 4 milliGRAM(s) Oral every 6 hours PRN Nausea and/or Vomiting  oxyCODONE    IR 10 milliGRAM(s) Oral every 4 hours PRN Moderate Pain (4 - 6)  oxyCODONE    IR 5 milliGRAM(s) Oral every 4 hours PRN Mild Pain (1 - 3)      HEALTH ISSUES - PROBLEM Dx:

## 2018-10-03 NOTE — PHYSICAL THERAPY INITIAL EVALUATION ADULT - GENERAL OBSERVATIONS, REHAB EVAL
13:15-13:55; Pt encountered in b/s chair, family present. Agreeable to PT. IVL by MANE Carter for amb. + O2, SpO2: 98% before and after amb on RA.

## 2018-10-04 ENCOUNTER — TRANSCRIPTION ENCOUNTER (OUTPATIENT)
Age: 83
End: 2018-10-04

## 2018-10-04 LAB
ANION GAP SERPL CALC-SCNC: 16 MMOL/L — HIGH (ref 7–14)
APPEARANCE UR: CLEAR — SIGNIFICANT CHANGE UP
BASOPHILS # BLD AUTO: 0.01 K/UL — SIGNIFICANT CHANGE UP (ref 0–0.2)
BASOPHILS NFR BLD AUTO: 0.1 % — SIGNIFICANT CHANGE UP (ref 0–1)
BILIRUB UR-MCNC: NEGATIVE — SIGNIFICANT CHANGE UP
BUN SERPL-MCNC: 14 MG/DL — SIGNIFICANT CHANGE UP (ref 10–20)
CALCIUM SERPL-MCNC: 8.4 MG/DL — LOW (ref 8.5–10.1)
CHLORIDE SERPL-SCNC: 102 MMOL/L — SIGNIFICANT CHANGE UP (ref 98–110)
CO2 SERPL-SCNC: 23 MMOL/L — SIGNIFICANT CHANGE UP (ref 17–32)
COLOR SPEC: YELLOW — SIGNIFICANT CHANGE UP
CREAT ?TM UR-MCNC: 22 MG/DL — SIGNIFICANT CHANGE UP
CREAT ?TM UR-MCNC: 23 MG/DL — SIGNIFICANT CHANGE UP
CREAT SERPL-MCNC: 0.7 MG/DL — SIGNIFICANT CHANGE UP (ref 0.7–1.5)
DIFF PNL FLD: NEGATIVE — SIGNIFICANT CHANGE UP
EOSINOPHIL # BLD AUTO: 0.01 K/UL — SIGNIFICANT CHANGE UP (ref 0–0.7)
EOSINOPHIL NFR BLD AUTO: 0.1 % — SIGNIFICANT CHANGE UP (ref 0–8)
GLUCOSE SERPL-MCNC: 136 MG/DL — HIGH (ref 70–99)
GLUCOSE UR QL: NEGATIVE — SIGNIFICANT CHANGE UP
HCT VFR BLD CALC: 33.4 % — LOW (ref 37–47)
HGB BLD-MCNC: 11.2 G/DL — LOW (ref 12–16)
IMM GRANULOCYTES NFR BLD AUTO: 0.6 % — HIGH (ref 0.1–0.3)
KETONES UR-MCNC: NEGATIVE — SIGNIFICANT CHANGE UP
LEUKOCYTE ESTERASE UR-ACNC: NEGATIVE — SIGNIFICANT CHANGE UP
LYMPHOCYTES # BLD AUTO: 1.46 K/UL — SIGNIFICANT CHANGE UP (ref 1.2–3.4)
LYMPHOCYTES # BLD AUTO: 15 % — LOW (ref 20.5–51.1)
MCHC RBC-ENTMCNC: 29.6 PG — SIGNIFICANT CHANGE UP (ref 27–31)
MCHC RBC-ENTMCNC: 33.5 G/DL — SIGNIFICANT CHANGE UP (ref 32–37)
MCV RBC AUTO: 88.4 FL — SIGNIFICANT CHANGE UP (ref 81–99)
MONOCYTES # BLD AUTO: 1.2 K/UL — HIGH (ref 0.1–0.6)
MONOCYTES NFR BLD AUTO: 12.3 % — HIGH (ref 1.7–9.3)
NEUTROPHILS # BLD AUTO: 6.99 K/UL — HIGH (ref 1.4–6.5)
NEUTROPHILS NFR BLD AUTO: 71.9 % — SIGNIFICANT CHANGE UP (ref 42.2–75.2)
NITRITE UR-MCNC: NEGATIVE — SIGNIFICANT CHANGE UP
NRBC # BLD: 0 /100 WBCS — SIGNIFICANT CHANGE UP (ref 0–0)
PH UR: 6 — SIGNIFICANT CHANGE UP (ref 5–8)
PLATELET # BLD AUTO: 167 K/UL — SIGNIFICANT CHANGE UP (ref 130–400)
POTASSIUM SERPL-MCNC: 3.6 MMOL/L — SIGNIFICANT CHANGE UP (ref 3.5–5)
POTASSIUM SERPL-SCNC: 3.6 MMOL/L — SIGNIFICANT CHANGE UP (ref 3.5–5)
PROT ?TM UR-MCNC: <5 MG/DLG/24H — SIGNIFICANT CHANGE UP
PROT ?TM UR-MCNC: <5 MG/DLG/24H — SIGNIFICANT CHANGE UP
PROT UR-MCNC: NEGATIVE — SIGNIFICANT CHANGE UP
PROT/CREAT UR-RTO: <0.2 RATIO — SIGNIFICANT CHANGE UP (ref 0–0.2)
RBC # BLD: 3.78 M/UL — LOW (ref 4.2–5.4)
RBC # FLD: 12.4 % — SIGNIFICANT CHANGE UP (ref 11.5–14.5)
SODIUM SERPL-SCNC: 141 MMOL/L — SIGNIFICANT CHANGE UP (ref 135–146)
SP GR SPEC: 1.01 — SIGNIFICANT CHANGE UP (ref 1.01–1.03)
UROBILINOGEN FLD QL: 0.2 — SIGNIFICANT CHANGE UP (ref 0.2–0.2)
WBC # BLD: 9.73 K/UL — SIGNIFICANT CHANGE UP (ref 4.8–10.8)
WBC # FLD AUTO: 9.73 K/UL — SIGNIFICANT CHANGE UP (ref 4.8–10.8)

## 2018-10-04 RX ORDER — HEPARIN SODIUM 5000 [USP'U]/ML
5000 INJECTION INTRAVENOUS; SUBCUTANEOUS
Qty: 0 | Refills: 0 | COMMUNITY
Start: 2018-10-04

## 2018-10-04 RX ORDER — OXYCODONE HYDROCHLORIDE 5 MG/1
1 TABLET ORAL
Qty: 0 | Refills: 0 | COMMUNITY
Start: 2018-10-04

## 2018-10-04 RX ORDER — TRAMADOL HYDROCHLORIDE 50 MG/1
1 TABLET ORAL
Qty: 0 | Refills: 0 | COMMUNITY
Start: 2018-10-04

## 2018-10-04 RX ORDER — ONDANSETRON 8 MG/1
1 TABLET, FILM COATED ORAL
Qty: 0 | Refills: 0 | COMMUNITY
Start: 2018-10-04

## 2018-10-04 RX ADMIN — HEPARIN SODIUM 5000 UNIT(S): 5000 INJECTION INTRAVENOUS; SUBCUTANEOUS at 05:13

## 2018-10-04 RX ADMIN — AMLODIPINE BESYLATE 5 MILLIGRAM(S): 2.5 TABLET ORAL at 21:40

## 2018-10-04 RX ADMIN — TRAMADOL HYDROCHLORIDE 50 MILLIGRAM(S): 50 TABLET ORAL at 05:21

## 2018-10-04 RX ADMIN — TRAMADOL HYDROCHLORIDE 50 MILLIGRAM(S): 50 TABLET ORAL at 05:15

## 2018-10-04 RX ADMIN — SODIUM CHLORIDE 75 MILLILITER(S): 9 INJECTION INTRAMUSCULAR; INTRAVENOUS; SUBCUTANEOUS at 05:12

## 2018-10-04 RX ADMIN — TRAMADOL HYDROCHLORIDE 50 MILLIGRAM(S): 50 TABLET ORAL at 21:40

## 2018-10-04 RX ADMIN — TRAMADOL HYDROCHLORIDE 50 MILLIGRAM(S): 50 TABLET ORAL at 14:03

## 2018-10-04 RX ADMIN — ATORVASTATIN CALCIUM 20 MILLIGRAM(S): 80 TABLET, FILM COATED ORAL at 21:40

## 2018-10-04 RX ADMIN — Medication 10 MILLIGRAM(S): at 05:13

## 2018-10-04 RX ADMIN — CHLORHEXIDINE GLUCONATE 1 APPLICATION(S): 213 SOLUTION TOPICAL at 05:13

## 2018-10-04 RX ADMIN — HEPARIN SODIUM 5000 UNIT(S): 5000 INJECTION INTRAVENOUS; SUBCUTANEOUS at 14:04

## 2018-10-04 RX ADMIN — HEPARIN SODIUM 5000 UNIT(S): 5000 INJECTION INTRAVENOUS; SUBCUTANEOUS at 21:40

## 2018-10-04 RX ADMIN — ATENOLOL 25 MILLIGRAM(S): 25 TABLET ORAL at 05:13

## 2018-10-04 NOTE — DISCHARGE NOTE ADULT - CARE PROVIDER_API CALL
Agustín Ariza), Internal Medicine  [address] [place] [address]  Phone: [phone]  Fax: [phone]    Guanaco Dave), Orthopaedic Surgery  [address] [place] [address]  Phone: [phone]  Fax: [phone]    Helio Ross), Critical Care Medicine; Pulmonary Disease; Sleep Medicine  [address] [place] [address]  Phone: [phone]  Fax: [phone]

## 2018-10-04 NOTE — PROGRESS NOTE ADULT - SUBJECTIVE AND OBJECTIVE BOX
ORTHO POST OP NOTE    Procedure: l Hemiarthroplasty of hip by anterior approach      Patient seen and examined at bedside. No acute events since OR. Resting without complaints. Pain controlled with medication. Denies chest pain, SOB, N/V.    Vital Signs Last 24 Hrs  T(C): 35.9 (04 Oct 2018 07:31), Max: 36.3 (04 Oct 2018 00:00)  T(F): 96.6 (04 Oct 2018 07:31), Max: 97.3 (04 Oct 2018 00:00)  HR: 89 (04 Oct 2018 07:31) (86 - 94)  BP: 163/72 (04 Oct 2018 07:31) (146/67 - 163/72)  BP(mean): --  RR: 18 (04 Oct 2018 07:31) (18 - 18)  SpO2: --      Exam:  Alert and [place], No Acute Distress    LLE  Dressing  C/D/I  Compartments soft/compressible  Calves soft  EHL/FHL Motor intact  SILT distally  Ext wwp                8            A/P: 89yF S/p L Hemiarthroplasty of hip pod 2       -PT/OT  -WBAT  -OOBTC  -F/U AM Labs  -DVT PPx  -Pain Control  -SCDs  -IS  -Continue Current Tx  -Dispo planning

## 2018-10-04 NOTE — PROGRESS NOTE ADULT - ASSESSMENT
________________________________________________________________________________  DAILY PROGRESS NOTE:    ================== SUBJECTIVE ==================    ART LYONS  /   89y  /  Female  /  MRN#: [id number]  Patient is a 89y old Female who presents with a chief complaint of Fall from standing (04 Oct 2018 13:43)  Currently admitted to medicine with the primary diagnosis of Femur neck fracture    HOSPITAL DAY: 5d    OVERNIGHT EVENTS: None    TODAY: Patient was seen this morning at bedside. Currently, the patient reports no complaints     Review of systems is otherwise negative.    =================== OBJECTIVE ===================    VITAL SIGNS: Last 24 Hours  T(C): 35.9 (04 Oct 2018 07:31), Max: 36.3 (04 Oct 2018 00:00)  T(F): 96.6 (04 Oct 2018 07:31), Max: 97.3 (04 Oct 2018 00:00)  HR: 89 (04 Oct 2018 07:31) (86 - 94)  BP: 163/72 (04 Oct 2018 07:31) (146/67 - 163/72)  BP(mean): --  RR: 18 (04 Oct 2018 07:31) (18 - 18)  SpO2: --    10-03-18 @ 07:  -  10-04-18 @ 07:00  --------------------------------------------------------  IN: 1295 mL / OUT: 1500 mL / NET: -205 mL    10-04-18 @ 07:01  -  10-04-18 @ 13:55  --------------------------------------------------------  IN: 420 mL / OUT: 0 mL / NET: 420 mL      GENERAL: NAD  NERVOUS SYSTEM:  Alert & Oriented X3, Good concentration; Motor Strength 5/5 B/L upper and lower extremities; DTRs 2+ intact and symmetric  CHEST/LUNG: Clear to percussion bilaterally; No rales, rhonchi, wheezing, or rubs  HEART: Regular rate and rhythm; No murmurs, rubs, or gallops  ABDOMEN: Soft, Nontender, Nondistended; Bowel sounds present  EXTREMITIES:  2+ Peripheral Pulses, No clubbing, cyanosis, or edema, pain L hip  SKIN: No rashes or lesions    ===================== LABS =====================                        11.2   9.73  )-----------( 167      ( 04 Oct 2018 05:34 )             33.4     10-04    141  |  102  |  14  ----------------------------<  136<H>  3.6   |  23  |  0.7    Ca    8.4<L>      04 Oct 2018 05:34        Urinalysis Basic - ( 04 Oct 2018 01:05 )    Color: Yellow / Appearance: Clear / S.015 / pH: x  Gluc: x / Ketone: Negative  / Bili: Negative / Urobili: 0.2   Blood: x / Protein: Negative / Nitrite: Negative   Leuk Esterase: Negative / RBC: x / WBC x   Sq Epi: x / Non Sq Epi: x / Bacteria: x    ================== ALLERGIES ===================  No Known Allergies    ==================== MEDS =====================  amLODIPine   Tablet 5 milliGRAM(s) Oral at bedtime  ATENolol  Tablet 25 milliGRAM(s) Oral daily  atorvastatin 20 milliGRAM(s) Oral at bedtime  chlorhexidine 4% Liquid 1 Application(s) Topical <User Schedule>  enalapril 10 milliGRAM(s) Oral daily  heparin  Injectable 5000 Unit(s) SubCutaneous every 8 hours  influenza   Vaccine 0.5 milliLiter(s) IntraMuscular once  lactated ringers. 1000 milliLiter(s) IV Continuous <Continuous>  sodium chloride 0.9%. 1000 milliLiter(s) IV Continuous <Continuous>  traMADol 50 milliGRAM(s) Oral three times a day    PRN MEDICATIONS  morphine  - Injectable 4 milliGRAM(s) SubCutaneous every 4 hours PRN  ondansetron    Tablet 4 milliGRAM(s) Oral every 6 hours PRN  oxyCODONE    IR 10 milliGRAM(s) Oral every 4 hours PRN  oxyCODONE    IR 5 milliGRAM(s) Oral every 4 hours PRN    ================= ASSESS/PLAN ==================  Patient is a 89y old Female who presents with a chief complaint of Fall from standing (03 Oct 2018 09:31)  Currently admitted to medicine with the primary diagnosis of Femur neck fracture      PLAN  # Left femoral neck hip fracture  - Seen by Ortho - s/p surgery day 1  - Hemodynamically stable, Hb stable   - Seen by physiatry: good candidate for 4A rehab    - NWB Left   - PRN analgesics  - As per cardiology   moderate risk patient (age) and surgery (orthopedics surgery), functional capacity at limited side, less than 4 METS, generally by accepting a moderate risk she can have the surgery done, she is already on beta blocker and statin  if bp remains high or HR stays above 90 then increase Atenolol to 50 mg daily  post op care as per the events     # HTN  - C/w atenolol, ACEi, CCB, BP OK    # DLD - c/w statin    # CKD IIIBI? w/Low Bicarb  - Give 1L IVF, repeat BMP  - Check UA & Ur protein / Cr ratio  - No hypotension    # Lung Nodule  - Followed with pulm (dr. Ross) - was advised no longer needs to follows since nodule was stable over 2 yrs      GI PPX: -  DVT PPX: Heparin SQ    DIET:  (X) Regular  /  () Cardiac  / () Renal  /  () Diabetic  /  () Fluid restriction  /   () NPO  ACTIVITY:  () Ad Mandi  /  (X) Advance as Tolerated  /  () Bed Rest  /  () Fall Precaution  /  () Seizure precaution    ================= PRESENT TODAY ==================    [place] Catheter (X) No / () Yes   Indication:  Central Line (X) No / ()Yes  Indication:  IV Fluids (X) No / ()Yes  Indication:    ================= DISPOSITION ==================    Patient to be discharged when condition(s) optimized.            Discharge to:  () Home  /  () SNF  /  () Hospice  /  (X) 4A rehab             Home services:  () Home health  /  () Rehab  /  () IV Abx  /  () Education  /  () None    ================= CODE STATUS =================                  (X) FULL CODE     |     () DNR     |     () DNI

## 2018-10-04 NOTE — DISCHARGE NOTE ADULT - PLAN OF CARE
Resolution The fracture was treated surgically with no complications   You are cleared for discharge to rehab  Follow up with orhtopedic within 2 weeks   Progressively return to your regular activities as tolerated Stability Stable. Continue taking your medications as prescribed and follow up with your primary care physician within 2 weeks This has been stable for the past 2 years. Follow up with your PCP and Dr. Ross for further recommendations

## 2018-10-04 NOTE — DISCHARGE NOTE ADULT - PATIENT PORTAL LINK FT
You can access the [facility] Patient [facility], offered by [facility], by registering with the following website: [email or web address]

## 2018-10-04 NOTE — DISCHARGE NOTE ADULT - MEDICATION SUMMARY - MEDICATIONS TO TAKE
I will START or STAY ON the medications listed below when I get home from the hospital:    oxyCODONE 10 mg oral tablet  -- 1 tab(s) by mouth every 4 hours, As needed, Moderate Pain (4 - 6)  -- Indication: For Pain     oxyCODONE 5 mg oral tablet  -- 1 tab(s) by mouth every 4 hours, As needed, Mild Pain (1 - 3)  -- Indication: For Pain     traMADol 50 mg oral tablet  -- 1 tab(s) by mouth 3 times a day  -- Indication: For Pain     enalapril 10 mg oral tablet  -- 1 tab(s) by mouth once a day  -- Indication: For Hypertension     heparin  -- 5000 unit(s) subcutaneous 3 times a day  -- Indication: For DVT Prophylaxis     ondansetron 4 mg oral tablet  -- 1 tab(s) by mouth every 6 hours, As needed, Nausea and/or Vomiting  -- Indication: For Nausea     atorvastatin 20 mg oral tablet  -- 1 tab(s) by mouth once a day  -- Indication: For Hyperlpidemia     atenolol 25 mg oral tablet  -- 1 tab(s) by mouth once a day  -- Indication: For Hypertension     amLODIPine 5 mg oral tablet  -- 1 tab(s) by mouth once a day (at bedtime)  -- Indication: For Hypertension

## 2018-10-04 NOTE — PROGRESS NOTE ADULT - SUBJECTIVE AND OBJECTIVE BOX
DIANE LYONSAN  89y  Female    Patient is a 89y old  Female who presents with a chief complaint of Fall from standing (04 Oct 2018 08:05)      INTERVAL HPI/OVERNIGHT EVENTS: None AA c/o some pain    T(C): 35.9 (10-04-18 @ 07:31), Max: 36.3 (10-04-18 @ 00:00)  HR: 89 (10-04-18 @ 07:31) (86 - 94)  BP: 163/72 (10-04-18 @ 07:31) (146/67 - 163/72)  RR: 18 (10-04-18 @ 07:31) (18 - 18)  SpO2: --  Wt(kg): --Vital Signs Last 24 Hrs  T(C): 35.9 (04 Oct 2018 07:31), Max: 36.3 (04 Oct 2018 00:00)  T(F): 96.6 (04 Oct 2018 07:31), Max: 97.3 (04 Oct 2018 00:00)  HR: 89 (04 Oct 2018 07:31) (86 - 94)  BP: 163/72 (04 Oct 2018 07:31) (146/67 - 163/72)  BP(mean): --  RR: 18 (04 Oct 2018 07:31) (18 - 18)  SpO2: --    PHYSICAL EXAM:  GENERAL: NAD, well-groomed, well-developed  HEAD:  Atraumatic, Normocephalic  NECK: Supple, No JVD, Normal thyroid  NERVOUS SYSTEM:  Alert & Oriented X3, Good concentration; Motor Strength 5/5 B/L upper and lower extremities; DTRs 2+ intact and symmetric  CHEST/LUNG: Clear to percussion bilaterally; No rales, rhonchi, wheezing, or rubs  HEART: Regular rate and rhythm; No murmurs, rubs, or gallops  ABDOMEN: Soft, Nontender, Nondistended; Bowel sounds present  EXTREMITIES:  2+ Peripheral Pulses, No clubbing, cyanosis, or edema    Consultant(s) Notes Reviewed:  [x ] YES  [ ] NO    Discussed with Consultants/Other Providers/Residents [ x] YES     LABS                         11.2   9.73  )-----------( 167      ( 04 Oct 2018 05:34 )             33.4     10-    141  |  102  |  14  ----------------------------<  136<H>  3.6   |  23  |  0.7    Ca    8.4<L>      04 Oct 2018 05:34              CAPILLARY BLOOD GLUCOSE      POCT Blood Glucose.: 114 mg/dL (02 Oct 2018 21:36)    Urinalysis Basic - ( 04 Oct 2018 01:05 )    Color: Yellow / Appearance: Clear / S.015 / pH: x  Gluc: x / Ketone: Negative  / Bili: Negative / Urobili: 0.2   Blood: x / Protein: Negative / Nitrite: Negative   Leuk Esterase: Negative / RBC: x / WBC x   Sq Epi: x / Non Sq Epi: x / Bacteria: x        RADIOLOGY & ADDITIONAL TESTS:    Imaging Personally Reviewed:  [x ] YES  [ ] NO    MEDICATIONS  (STANDING):  amLODIPine   Tablet 5 milliGRAM(s) Oral at bedtime  ATENolol  Tablet 25 milliGRAM(s) Oral daily  atorvastatin 20 milliGRAM(s) Oral at bedtime  chlorhexidine 4% Liquid 1 Application(s) Topical <User Schedule>  enalapril 10 milliGRAM(s) Oral daily  heparin  Injectable 5000 Unit(s) SubCutaneous every 8 hours  influenza   Vaccine 0.5 milliLiter(s) IntraMuscular once  lactated ringers. 1000 milliLiter(s) (125 mL/Hr) IV Continuous <Continuous>  sodium chloride 0.9%. 1000 milliLiter(s) (75 mL/Hr) IV Continuous <Continuous>  traMADol 50 milliGRAM(s) Oral three times a day    MEDICATIONS  (PRN):  morphine  - Injectable 4 milliGRAM(s) SubCutaneous every 4 hours PRN Severe Pain (7 - 10)  ondansetron    Tablet 4 milliGRAM(s) Oral every 6 hours PRN Nausea and/or Vomiting  oxyCODONE    IR 10 milliGRAM(s) Oral every 4 hours PRN Moderate Pain (4 - 6)  oxyCODONE    IR 5 milliGRAM(s) Oral every 4 hours PRN Mild Pain (1 - 3)      HEALTH ISSUES - PROBLEM Dx:

## 2018-10-05 ENCOUNTER — INPATIENT (INPATIENT)
Facility: HOSPITAL | Age: 83
LOS: 20 days | Discharge: HOME HEALTH | End: 2018-10-26
Attending: PHYSICAL MEDICINE & REHABILITATION | Admitting: PHYSICAL MEDICINE & REHABILITATION
Payer: MEDICARE

## 2018-10-05 ENCOUNTER — TRANSCRIPTION ENCOUNTER (OUTPATIENT)
Age: 83
End: 2018-10-05

## 2018-10-05 VITALS
SYSTOLIC BLOOD PRESSURE: 140 MMHG | TEMPERATURE: 96 F | RESPIRATION RATE: 18 BRPM | HEART RATE: 83 BPM | DIASTOLIC BLOOD PRESSURE: 73 MMHG

## 2018-10-05 DIAGNOSIS — Z98.890 OTHER SPECIFIED POSTPROCEDURAL STATES: Chronic | ICD-10-CM

## 2018-10-05 PROBLEM — E78.5 HYPERLIPIDEMIA, UNSPECIFIED: Chronic | Status: ACTIVE | Noted: 2018-09-30

## 2018-10-05 PROBLEM — M81.0 AGE-RELATED OSTEOPOROSIS WITHOUT CURRENT PATHOLOGICAL FRACTURE: Chronic | Status: ACTIVE | Noted: 2018-09-30

## 2018-10-05 PROBLEM — R91.1 SOLITARY PULMONARY NODULE: Chronic | Status: ACTIVE | Noted: 2018-09-30

## 2018-10-05 PROBLEM — I10 ESSENTIAL (PRIMARY) HYPERTENSION: Chronic | Status: ACTIVE | Noted: 2018-09-30

## 2018-10-05 LAB
ANION GAP SERPL CALC-SCNC: 18 MMOL/L — HIGH (ref 7–14)
BUN SERPL-MCNC: 15 MG/DL — SIGNIFICANT CHANGE UP (ref 10–20)
CALCIUM SERPL-MCNC: 9 MG/DL — SIGNIFICANT CHANGE UP (ref 8.5–10.1)
CHLORIDE SERPL-SCNC: 96 MMOL/L — LOW (ref 98–110)
CO2 SERPL-SCNC: 23 MMOL/L — SIGNIFICANT CHANGE UP (ref 17–32)
CREAT SERPL-MCNC: 0.7 MG/DL — SIGNIFICANT CHANGE UP (ref 0.7–1.5)
GLUCOSE SERPL-MCNC: 110 MG/DL — HIGH (ref 70–99)
HCT VFR BLD CALC: 34.7 % — LOW (ref 37–47)
HGB BLD-MCNC: 11.6 G/DL — LOW (ref 12–16)
MCHC RBC-ENTMCNC: 29.8 PG — SIGNIFICANT CHANGE UP (ref 27–31)
MCHC RBC-ENTMCNC: 33.4 G/DL — SIGNIFICANT CHANGE UP (ref 32–37)
MCV RBC AUTO: 89.2 FL — SIGNIFICANT CHANGE UP (ref 81–99)
NRBC # BLD: 0 /100 WBCS — SIGNIFICANT CHANGE UP (ref 0–0)
PLATELET # BLD AUTO: 133 K/UL — SIGNIFICANT CHANGE UP (ref 130–400)
POTASSIUM SERPL-MCNC: 4 MMOL/L — SIGNIFICANT CHANGE UP (ref 3.5–5)
POTASSIUM SERPL-SCNC: 4 MMOL/L — SIGNIFICANT CHANGE UP (ref 3.5–5)
RBC # BLD: 3.89 M/UL — LOW (ref 4.2–5.4)
RBC # FLD: 12.6 % — SIGNIFICANT CHANGE UP (ref 11.5–14.5)
SODIUM SERPL-SCNC: 137 MMOL/L — SIGNIFICANT CHANGE UP (ref 135–146)
WBC # BLD: 7.57 K/UL — SIGNIFICANT CHANGE UP (ref 4.8–10.8)
WBC # FLD AUTO: 7.57 K/UL — SIGNIFICANT CHANGE UP (ref 4.8–10.8)

## 2018-10-05 RX ORDER — MAGNESIUM HYDROXIDE 400 MG/1
30 TABLET, CHEWABLE ORAL EVERY 12 HOURS
Qty: 0 | Refills: 0 | Status: DISCONTINUED | OUTPATIENT
Start: 2018-10-05 | End: 2018-10-26

## 2018-10-05 RX ORDER — ATENOLOL 25 MG/1
25 TABLET ORAL DAILY
Qty: 0 | Refills: 0 | Status: DISCONTINUED | OUTPATIENT
Start: 2018-10-05 | End: 2018-10-06

## 2018-10-05 RX ORDER — OXYCODONE HYDROCHLORIDE 5 MG/1
10 TABLET ORAL EVERY 4 HOURS
Qty: 0 | Refills: 0 | Status: DISCONTINUED | OUTPATIENT
Start: 2018-10-05 | End: 2018-10-05

## 2018-10-05 RX ORDER — ACETAMINOPHEN 500 MG
650 TABLET ORAL EVERY 6 HOURS
Qty: 0 | Refills: 0 | Status: DISCONTINUED | OUTPATIENT
Start: 2018-10-05 | End: 2018-10-06

## 2018-10-05 RX ORDER — TRAMADOL HYDROCHLORIDE 50 MG/1
50 TABLET ORAL EVERY 8 HOURS
Qty: 0 | Refills: 0 | Status: DISCONTINUED | OUTPATIENT
Start: 2018-10-05 | End: 2018-10-12

## 2018-10-05 RX ORDER — ATORVASTATIN CALCIUM 80 MG/1
20 TABLET, FILM COATED ORAL AT BEDTIME
Qty: 0 | Refills: 0 | Status: DISCONTINUED | OUTPATIENT
Start: 2018-10-05 | End: 2018-10-26

## 2018-10-05 RX ORDER — ONDANSETRON 8 MG/1
4 TABLET, FILM COATED ORAL EVERY 6 HOURS
Qty: 0 | Refills: 0 | Status: DISCONTINUED | OUTPATIENT
Start: 2018-10-05 | End: 2018-10-26

## 2018-10-05 RX ORDER — AMLODIPINE BESYLATE 2.5 MG/1
5 TABLET ORAL AT BEDTIME
Qty: 0 | Refills: 0 | Status: DISCONTINUED | OUTPATIENT
Start: 2018-10-05 | End: 2018-10-06

## 2018-10-05 RX ORDER — OXYCODONE HYDROCHLORIDE 5 MG/1
5 TABLET ORAL EVERY 4 HOURS
Qty: 0 | Refills: 0 | Status: DISCONTINUED | OUTPATIENT
Start: 2018-10-05 | End: 2018-10-05

## 2018-10-05 RX ORDER — CHLORHEXIDINE GLUCONATE 213 G/1000ML
1 SOLUTION TOPICAL
Qty: 0 | Refills: 0 | Status: DISCONTINUED | OUTPATIENT
Start: 2018-10-05 | End: 2018-10-06

## 2018-10-05 RX ADMIN — CHLORHEXIDINE GLUCONATE 1 APPLICATION(S): 213 SOLUTION TOPICAL at 05:35

## 2018-10-05 RX ADMIN — AMLODIPINE BESYLATE 5 MILLIGRAM(S): 2.5 TABLET ORAL at 21:56

## 2018-10-05 RX ADMIN — TRAMADOL HYDROCHLORIDE 50 MILLIGRAM(S): 50 TABLET ORAL at 05:35

## 2018-10-05 RX ADMIN — ATENOLOL 25 MILLIGRAM(S): 25 TABLET ORAL at 05:35

## 2018-10-05 RX ADMIN — TRAMADOL HYDROCHLORIDE 50 MILLIGRAM(S): 50 TABLET ORAL at 22:01

## 2018-10-05 RX ADMIN — HEPARIN SODIUM 5000 UNIT(S): 5000 INJECTION INTRAVENOUS; SUBCUTANEOUS at 05:35

## 2018-10-05 RX ADMIN — ATORVASTATIN CALCIUM 20 MILLIGRAM(S): 80 TABLET, FILM COATED ORAL at 21:57

## 2018-10-05 RX ADMIN — TRAMADOL HYDROCHLORIDE 50 MILLIGRAM(S): 50 TABLET ORAL at 23:10

## 2018-10-05 RX ADMIN — Medication 10 MILLIGRAM(S): at 05:35

## 2018-10-05 NOTE — PROGRESS NOTE ADULT - PROVIDER SPECIALTY LIST ADULT
Internal Medicine
Orthopedics
Internal Medicine

## 2018-10-05 NOTE — PROGRESS NOTE ADULT - SUBJECTIVE AND OBJECTIVE BOX
DIANE LYONSAN  89y  Female    Patient is a 89y old  Female who presents with a chief complaint of Fall from standing (04 Oct 2018 13:55)      INTERVAL HPI/OVERNIGHT EVENTS: None    T(C): 35.8 (10-05-18 @ 01:32), Max: 36.7 (10-04-18 @ 15:40)  HR: 87 (10-05-18 @ 01:32) (85 - 99)  BP: 138/53 (10-05-18 @ 01:32) (129/60 - 174/72)  RR: 20 (10-05-18 @ 01:32) (18 - 20)  SpO2: --  Wt(kg): --Vital Signs Last 24 Hrs  T(C): 35.8 (05 Oct 2018 01:32), Max: 36.7 (04 Oct 2018 15:40)  T(F): 96.4 (05 Oct 2018 01:32), Max: 98 (04 Oct 2018 15:40)  HR: 87 (05 Oct 2018 01:32) (85 - 99)  BP: 138/53 (05 Oct 2018 01:32) (129/60 - 174/72)  BP(mean): --  RR: 20 (05 Oct 2018 01:32) (18 - 20)  SpO2: --    PHYSICAL EXAM:  GENERAL: NAD, well-groomed, well-developed  HEAD:  Atraumatic, Normocephalic  NECK: Supple, No JVD, Normal thyroid  NERVOUS SYSTEM:  Alert & Oriented X3, Good concentration; Motor Strength 5/5 B/L upper and lower extremities; DTRs 2+ intact and symmetric  CHEST/LUNG: Clear to percussion bilaterally; No rales, rhonchi, wheezing, or rubs  HEART: Regular rate and rhythm; No murmurs, rubs, or gallops  ABDOMEN: Soft, Nontender, Nondistended; Bowel sounds present  EXTREMITIES:  2+ Peripheral Pulses, No clubbing, cyanosis, or edema    Consultant(s) Notes Reviewed:  [x ] YES  [ ] NO    Discussed with Consultants/Other Providers/Residents [ x] YES     LABS                         11.2   9.73  )-----------( 167      ( 04 Oct 2018 05:34 )             33.4     10-04    141  |  102  |  14  ----------------------------<  136<H>  3.6   |  23  |  0.7    Ca    8.4<L>      04 Oct 2018 05:34              CAPILLARY BLOOD GLUCOSE        Urinalysis Basic - ( 04 Oct 2018 01:05 )    Color: Yellow / Appearance: Clear / S.015 / pH: x  Gluc: x / Ketone: Negative  / Bili: Negative / Urobili: 0.2   Blood: x / Protein: Negative / Nitrite: Negative   Leuk Esterase: Negative / RBC: x / WBC x   Sq Epi: x / Non Sq Epi: x / Bacteria: x        RADIOLOGY & ADDITIONAL TESTS:    Imaging Personally Reviewed:  [x ] YES  [ ] NO    MEDICATIONS  (STANDING):  amLODIPine   Tablet 5 milliGRAM(s) Oral at bedtime  ATENolol  Tablet 25 milliGRAM(s) Oral daily  atorvastatin 20 milliGRAM(s) Oral at bedtime  chlorhexidine 4% Liquid 1 Application(s) Topical <User Schedule>  enalapril 10 milliGRAM(s) Oral daily  heparin  Injectable 5000 Unit(s) SubCutaneous every 8 hours  influenza   Vaccine 0.5 milliLiter(s) IntraMuscular once  lactated ringers. 1000 milliLiter(s) (125 mL/Hr) IV Continuous <Continuous>  sodium chloride 0.9%. 1000 milliLiter(s) (75 mL/Hr) IV Continuous <Continuous>  traMADol 50 milliGRAM(s) Oral three times a day    MEDICATIONS  (PRN):  morphine  - Injectable 4 milliGRAM(s) SubCutaneous every 4 hours PRN Severe Pain (7 - 10)  ondansetron    Tablet 4 milliGRAM(s) Oral every 6 hours PRN Nausea and/or Vomiting  oxyCODONE    IR 10 milliGRAM(s) Oral every 4 hours PRN Moderate Pain (4 - 6)  oxyCODONE    IR 5 milliGRAM(s) Oral every 4 hours PRN Mild Pain (1 - 3)      HEALTH ISSUES - PROBLEM Dx:

## 2018-10-05 NOTE — PROGRESS NOTE ADULT - ASSESSMENT
· Subjective and Objective: 	    ART LYONS  89y  Female    Patient is a 89y old  Female who presents with a chief complaint of Fall from standing (04 Oct 2018 13:55)      INTERVAL HPI/OVERNIGHT EVENTS: None    T(C): 35.8 (10-05-18 @ 01:32), Max: 36.7 (10-04-18 @ 15:40)  HR: 87 (10-05-18 @ 01:32) (85 - 99)  BP: 138/53 (10-05-18 @ 01:32) (129/60 - 174/72)  RR: 20 (10-05-18 @ 01:32) (18 - 20)  SpO2: --  Wt(kg): --Vital Signs Last 24 Hrs  T(C): 35.8 (05 Oct 2018 01:32), Max: 36.7 (04 Oct 2018 15:40)  T(F): 96.4 (05 Oct 2018 01:32), Max: 98 (04 Oct 2018 15:40)  HR: 87 (05 Oct 2018 01:32) (85 - 99)  BP: 138/53 (05 Oct 2018 01:32) (129/60 - 174/72)  BP(mean): --  RR: 20 (05 Oct 2018 01:32) (18 - 20)  SpO2: --    PHYSICAL EXAM:  GENERAL: NAD, well-groomed, well-developed  HEAD:  Atraumatic, Normocephalic  NECK: Supple, No JVD, Normal thyroid  NERVOUS SYSTEM:  Alert & Oriented X3, Good concentration; Motor Strength 5/5 B/L upper and lower extremities; DTRs 2+ intact and symmetric  CHEST/LUNG: Clear to percussion bilaterally; No rales, rhonchi, wheezing, or rubs  HEART: Regular rate and rhythm; No murmurs, rubs, or gallops  ABDOMEN: Soft, Nontender, Nondistended; Bowel sounds present  EXTREMITIES:  2+ Peripheral Pulses, No clubbing, cyanosis, or edema    Consultant(s) Notes Reviewed:  [x ] YES  [ ] NO    Discussed with Consultants/Other Providers/Residents [ x] YES     LABS                         11.2   9.73  )-----------( 167      ( 04 Oct 2018 05:34 )             33.4     10-    141  |  102  |  14  ----------------------------<  136<H>  3.6   |  23  |  0.7    Ca    8.4<L>      04 Oct 2018 05:34              CAPILLARY BLOOD GLUCOSE        Urinalysis Basic - ( 04 Oct 2018 01:05 )    Color: Yellow / Appearance: Clear / S.015 / pH: x  Gluc: x / Ketone: Negative  / Bili: Negative / Urobili: 0.2   Blood: x / Protein: Negative / Nitrite: Negative   Leuk Esterase: Negative / RBC: x / WBC x   Sq Epi: x / Non Sq Epi: x / Bacteria: x        RADIOLOGY & ADDITIONAL TESTS:    Imaging Personally Reviewed:  [x ] YES  [ ] NO    MEDICATIONS  (STANDING):  amLODIPine   Tablet 5 milliGRAM(s) Oral at bedtime  ATENolol  Tablet 25 milliGRAM(s) Oral daily  atorvastatin 20 milliGRAM(s) Oral at bedtime  chlorhexidine 4% Liquid 1 Application(s) Topical <User Schedule>  enalapril 10 milliGRAM(s) Oral daily  heparin  Injectable 5000 Unit(s) SubCutaneous every 8 hours  influenza   Vaccine 0.5 milliLiter(s) IntraMuscular once  lactated ringers. 1000 milliLiter(s) (125 mL/Hr) IV Continuous <Continuous>  sodium chloride 0.9%. 1000 milliLiter(s) (75 mL/Hr) IV Continuous <Continuous>  traMADol 50 milliGRAM(s) Oral three times a day    MEDICATIONS  (PRN):  morphine  - Injectable 4 milliGRAM(s) SubCutaneous every 4 hours PRN Severe Pain (7 - 10)  ondansetron    Tablet 4 milliGRAM(s) Oral every 6 hours PRN Nausea and/or Vomiting  oxyCODONE    IR 10 milliGRAM(s) Oral every 4 hours PRN Moderate Pain (4 - 6)  oxyCODONE    IR 5 milliGRAM(s) Oral every 4 hours PRN Mild Pain (1 - 3)      HEALTH ISSUES - PROBLEM Dx:          Attending Attestation:   89/F hx of HTN, p/w fall from standing after loss of balance. Found to have left fremoral neck fx. Admit to medicine for Left femoral neck fx.    1.  Left femoral neck hip fracture      post L Hemiarthroplasty       Ortho  f/u appreciated          NWB Left      Pain control  Patient will be discharged to  today. Patient stable     2.  HTN      C/w atenolol, ACEi, CCB    3. DLD - c/w statin    4.  CKD III  ??  Creatnine 0.7, stable     5. Lung Nodule     Stable     no need for  pulm f/u    DVT ppx - heparin  NWB left  DASH diet  No GI ppx  Full Code

## 2018-10-05 NOTE — PROGRESS NOTE ADULT - REASON FOR ADMISSION
Fall from standing

## 2018-10-05 NOTE — PROGRESS NOTE ADULT - ATTENDING COMMENTS
89/F hx of HTN, p/w fall from standing after loss of balance. Found to have left fremoral neck fx. Admit to medicine for Left femoral neck fx.    1.  Left femoral neck hip fracture      post L Hemiarthroplasty       Ortho  f/u appreciated          NWB Left    2.  HTN      C/w atenolol, ACEi, CCB    3. DLD - c/w statin    4.  CKD III  ??     Check UA & Ur protein / Cr ratio      No hypotension    5. Lung Nodule     Stable     no need for  pulm f/u    DVT ppx - heparin  NWB left  DASH diet  No GI ppx  Full Code   PT DC planning
89/F hx of HTN, p/w fall from standing after loss of balance. Found to have left fremoral neck fx. Admit to medicine for Left femoral neck fx.    1.  Left femoral neck hip fracture      post L Hemiarthroplasty       Ortho  f/u appreciated          NWB Left    2.  HTN      C/w atenolol, ACEi, CCB    3. DLD - c/w statin    4.  CKD III  ??     w/Low Bicarb     Give 1L IVF, repeat BMP     Check UA & Ur protein / Cr ratio      No hypotension    5. Lung Nodule     Stable     no need for  pulm f/u    DVT ppx - heparin  NWB left  DASH diet  No GI ppx  Full Code   PT DC planning
89/F hx of HTN, p/w fall from standing after loss of balance. Found to have left fremoral neck fx. Admit to medicine for Left femoral neck fx.    1.  Left femoral neck hip fracture      post L Hemiarthroplasty       Ortho  f/u appreciated          NWB Left      Pain control      4A candidate    2.  HTN      C/w atenolol, ACEi, CCB    3. DLD - c/w statin    4.  CKD III  ??     Check UA & Ur protein / Cr ratio      No hypotension    5. Lung Nodule     Stable     no need for  pulm f/u    DVT ppx - heparin  NWB left  DASH diet  No GI ppx  Full Code   PT DC planning
pt s&e.  POD1 from left hip hemiarthroplasty.  doing well minimal pain.    Vital Signs Last 24 Hrs  T(C): 35.8 (03 Oct 2018 07:36), Max: 36.7 (02 Oct 2018 15:30)  T(F): 96.5 (03 Oct 2018 07:36), Max: 98 (02 Oct 2018 15:30)  HR: 78 (03 Oct 2018 07:36) (78 - 90)  BP: 174/78 (03 Oct 2018 07:36) (113/54 - 174/78)  BP(mean): --  RR: 18 (03 Oct 2018 07:36) (14 - 18)  SpO2: 100% (03 Oct 2018 02:00) (95% - 100%)                        12.3   8.40  )-----------( 129      ( 03 Oct 2018 05:51 )             37.6     case discussed on rounds with housestaff.  agree with above findings and plan of care.
pt seen and examined.  cont current management

## 2018-10-06 LAB
24R-OH-CALCIDIOL SERPL-MCNC: 31 NG/ML — SIGNIFICANT CHANGE UP (ref 30–80)
ALBUMIN SERPL ELPH-MCNC: 3.4 G/DL — LOW (ref 3.5–5.2)
ALP SERPL-CCNC: 72 U/L — SIGNIFICANT CHANGE UP (ref 30–115)
ALT FLD-CCNC: 23 U/L — SIGNIFICANT CHANGE UP (ref 0–41)
ANION GAP SERPL CALC-SCNC: 16 MMOL/L — HIGH (ref 7–14)
APPEARANCE UR: ABNORMAL
AST SERPL-CCNC: 40 U/L — SIGNIFICANT CHANGE UP (ref 0–41)
BASOPHILS # BLD AUTO: 0.04 K/UL — SIGNIFICANT CHANGE UP (ref 0–0.2)
BASOPHILS NFR BLD AUTO: 0.5 % — SIGNIFICANT CHANGE UP (ref 0–1)
BILIRUB SERPL-MCNC: 0.7 MG/DL — SIGNIFICANT CHANGE UP (ref 0.2–1.2)
BILIRUB UR-MCNC: NEGATIVE — SIGNIFICANT CHANGE UP
BUN SERPL-MCNC: 13 MG/DL — SIGNIFICANT CHANGE UP (ref 10–20)
CALCIUM SERPL-MCNC: 9 MG/DL — SIGNIFICANT CHANGE UP (ref 8.5–10.1)
CHLORIDE SERPL-SCNC: 97 MMOL/L — LOW (ref 98–110)
CO2 SERPL-SCNC: 23 MMOL/L — SIGNIFICANT CHANGE UP (ref 17–32)
COLOR SPEC: YELLOW — SIGNIFICANT CHANGE UP
CREAT SERPL-MCNC: 0.5 MG/DL — LOW (ref 0.7–1.5)
DIFF PNL FLD: ABNORMAL
EOSINOPHIL # BLD AUTO: 0.37 K/UL — SIGNIFICANT CHANGE UP (ref 0–0.7)
EOSINOPHIL NFR BLD AUTO: 4.5 % — SIGNIFICANT CHANGE UP (ref 0–8)
GLUCOSE SERPL-MCNC: 115 MG/DL — HIGH (ref 70–99)
GLUCOSE UR QL: NEGATIVE MG/DL — SIGNIFICANT CHANGE UP
HCT VFR BLD CALC: 34.5 % — LOW (ref 37–47)
HGB BLD-MCNC: 11.6 G/DL — LOW (ref 12–16)
IMM GRANULOCYTES NFR BLD AUTO: 1.3 % — HIGH (ref 0.1–0.3)
KETONES UR-MCNC: NEGATIVE — SIGNIFICANT CHANGE UP
LEUKOCYTE ESTERASE UR-ACNC: NEGATIVE — SIGNIFICANT CHANGE UP
LYMPHOCYTES # BLD AUTO: 2.01 K/UL — SIGNIFICANT CHANGE UP (ref 1.2–3.4)
LYMPHOCYTES # BLD AUTO: 24.6 % — SIGNIFICANT CHANGE UP (ref 20.5–51.1)
MAGNESIUM SERPL-MCNC: 1.8 MG/DL — SIGNIFICANT CHANGE UP (ref 1.8–2.4)
MCHC RBC-ENTMCNC: 29.9 PG — SIGNIFICANT CHANGE UP (ref 27–31)
MCHC RBC-ENTMCNC: 33.6 G/DL — SIGNIFICANT CHANGE UP (ref 32–37)
MCV RBC AUTO: 88.9 FL — SIGNIFICANT CHANGE UP (ref 81–99)
MONOCYTES # BLD AUTO: 1.22 K/UL — HIGH (ref 0.1–0.6)
MONOCYTES NFR BLD AUTO: 14.9 % — HIGH (ref 1.7–9.3)
NEUTROPHILS # BLD AUTO: 4.42 K/UL — SIGNIFICANT CHANGE UP (ref 1.4–6.5)
NEUTROPHILS NFR BLD AUTO: 54.2 % — SIGNIFICANT CHANGE UP (ref 42.2–75.2)
NITRITE UR-MCNC: NEGATIVE — SIGNIFICANT CHANGE UP
NRBC # BLD: 0 /100 WBCS — SIGNIFICANT CHANGE UP (ref 0–0)
PH UR: 7 — SIGNIFICANT CHANGE UP (ref 5–8)
PLATELET # BLD AUTO: 167 K/UL — SIGNIFICANT CHANGE UP (ref 130–400)
POTASSIUM SERPL-MCNC: 4.1 MMOL/L — SIGNIFICANT CHANGE UP (ref 3.5–5)
POTASSIUM SERPL-SCNC: 4.1 MMOL/L — SIGNIFICANT CHANGE UP (ref 3.5–5)
PROT SERPL-MCNC: 5.6 G/DL — LOW (ref 6–8)
PROT UR-MCNC: NEGATIVE MG/DL — SIGNIFICANT CHANGE UP
RBC # BLD: 3.88 M/UL — LOW (ref 4.2–5.4)
RBC # FLD: 12.5 % — SIGNIFICANT CHANGE UP (ref 11.5–14.5)
RBC CASTS # UR COMP ASSIST: SIGNIFICANT CHANGE UP /HPF
SODIUM SERPL-SCNC: 136 MMOL/L — SIGNIFICANT CHANGE UP (ref 135–146)
SP GR SPEC: <=1.005 — SIGNIFICANT CHANGE UP (ref 1.01–1.03)
UROBILINOGEN FLD QL: 1 MG/DL (ref 0.2–0.2)
WBC # BLD: 8.17 K/UL — SIGNIFICANT CHANGE UP (ref 4.8–10.8)
WBC # FLD AUTO: 8.17 K/UL — SIGNIFICANT CHANGE UP (ref 4.8–10.8)

## 2018-10-06 RX ORDER — SENNA PLUS 8.6 MG/1
2 TABLET ORAL AT BEDTIME
Qty: 0 | Refills: 0 | Status: DISCONTINUED | OUTPATIENT
Start: 2018-10-06 | End: 2018-10-17

## 2018-10-06 RX ORDER — HEPARIN SODIUM 5000 [USP'U]/ML
5000 INJECTION INTRAVENOUS; SUBCUTANEOUS EVERY 12 HOURS
Qty: 0 | Refills: 0 | Status: DISCONTINUED | OUTPATIENT
Start: 2018-10-06 | End: 2018-10-26

## 2018-10-06 RX ORDER — LACTULOSE 10 G/15ML
20 SOLUTION ORAL DAILY
Qty: 0 | Refills: 0 | Status: DISCONTINUED | OUTPATIENT
Start: 2018-10-06 | End: 2018-10-17

## 2018-10-06 RX ORDER — POLYETHYLENE GLYCOL 3350 17 G/17G
17 POWDER, FOR SOLUTION ORAL
Qty: 0 | Refills: 0 | Status: DISCONTINUED | OUTPATIENT
Start: 2018-10-06 | End: 2018-10-17

## 2018-10-06 RX ORDER — AMLODIPINE BESYLATE 2.5 MG/1
5 TABLET ORAL AT BEDTIME
Qty: 0 | Refills: 0 | Status: DISCONTINUED | OUTPATIENT
Start: 2018-10-06 | End: 2018-10-16

## 2018-10-06 RX ORDER — PANTOPRAZOLE SODIUM 20 MG/1
40 TABLET, DELAYED RELEASE ORAL
Qty: 0 | Refills: 0 | Status: DISCONTINUED | OUTPATIENT
Start: 2018-10-06 | End: 2018-10-26

## 2018-10-06 RX ORDER — DOCUSATE SODIUM 100 MG
100 CAPSULE ORAL THREE TIMES A DAY
Qty: 0 | Refills: 0 | Status: DISCONTINUED | OUTPATIENT
Start: 2018-10-06 | End: 2018-10-17

## 2018-10-06 RX ORDER — ATENOLOL 25 MG/1
25 TABLET ORAL DAILY
Qty: 0 | Refills: 0 | Status: DISCONTINUED | OUTPATIENT
Start: 2018-10-06 | End: 2018-10-26

## 2018-10-06 RX ORDER — ACETAMINOPHEN 500 MG
650 TABLET ORAL EVERY 6 HOURS
Qty: 0 | Refills: 0 | Status: DISCONTINUED | OUTPATIENT
Start: 2018-10-06 | End: 2018-10-26

## 2018-10-06 RX ADMIN — LACTULOSE 20 GRAM(S): 10 SOLUTION ORAL at 11:07

## 2018-10-06 RX ADMIN — TRAMADOL HYDROCHLORIDE 50 MILLIGRAM(S): 50 TABLET ORAL at 06:08

## 2018-10-06 RX ADMIN — TRAMADOL HYDROCHLORIDE 50 MILLIGRAM(S): 50 TABLET ORAL at 14:37

## 2018-10-06 RX ADMIN — PANTOPRAZOLE SODIUM 40 MILLIGRAM(S): 20 TABLET, DELAYED RELEASE ORAL at 11:23

## 2018-10-06 RX ADMIN — AMLODIPINE BESYLATE 5 MILLIGRAM(S): 2.5 TABLET ORAL at 21:27

## 2018-10-06 RX ADMIN — SENNA PLUS 2 TABLET(S): 8.6 TABLET ORAL at 21:28

## 2018-10-06 RX ADMIN — ATORVASTATIN CALCIUM 20 MILLIGRAM(S): 80 TABLET, FILM COATED ORAL at 21:27

## 2018-10-06 RX ADMIN — ATENOLOL 25 MILLIGRAM(S): 25 TABLET ORAL at 05:50

## 2018-10-06 RX ADMIN — TRAMADOL HYDROCHLORIDE 50 MILLIGRAM(S): 50 TABLET ORAL at 15:34

## 2018-10-06 RX ADMIN — TRAMADOL HYDROCHLORIDE 50 MILLIGRAM(S): 50 TABLET ORAL at 05:50

## 2018-10-06 RX ADMIN — Medication 100 MILLIGRAM(S): at 11:06

## 2018-10-06 RX ADMIN — Medication 100 MILLIGRAM(S): at 14:37

## 2018-10-06 RX ADMIN — Medication 10 MILLIGRAM(S): at 06:01

## 2018-10-06 RX ADMIN — TRAMADOL HYDROCHLORIDE 50 MILLIGRAM(S): 50 TABLET ORAL at 21:28

## 2018-10-06 RX ADMIN — TRAMADOL HYDROCHLORIDE 50 MILLIGRAM(S): 50 TABLET ORAL at 21:54

## 2018-10-06 RX ADMIN — HEPARIN SODIUM 5000 UNIT(S): 5000 INJECTION INTRAVENOUS; SUBCUTANEOUS at 06:01

## 2018-10-06 RX ADMIN — HEPARIN SODIUM 5000 UNIT(S): 5000 INJECTION INTRAVENOUS; SUBCUTANEOUS at 17:07

## 2018-10-06 RX ADMIN — Medication 100 MILLIGRAM(S): at 21:29

## 2018-10-06 RX ADMIN — POLYETHYLENE GLYCOL 3350 17 GRAM(S): 17 POWDER, FOR SOLUTION ORAL at 11:07

## 2018-10-06 NOTE — DISCHARGE NOTE ADULT - PATIENT PORTAL LINK FT
You can access the [facility] Patient Portal, offered by [facility], by registering with the following website: [email or web address]

## 2018-10-06 NOTE — DISCHARGE NOTE ADULT - REASON FOR ADMISSION
On day of presentation this 89 yo woman with hx HTN, DLD, benign left breast lesion s/p lumpectomy 2015, fell from one step, lost her balance and landed on her left side.  She was unable to stand and had left hip pain radiating down her left leg. Neighbors that live below heard the fall and came to help her. No other injuries, no LOC, no chest pain, no SOB, no palpitations. She was found to have left hip (femoral neck) fx; she underwent left hip hemiarthroplasty by anterior approach by Dr. Ramirez  on 10/2/18. The patient is active at home, drives, lives alone. Curran catheter was placed due to urinary retention, and she failed two subsequent voiding trials, with PVR of 850cc. She was successfully treated with Cipro x 10days for a UTI. She was seen by  and she will need to follow up with either urology or preferably with URO-GYN for further studies, including urodynamics, since she is being discharged home with the curran. She is on atenolol and enalapril for HTN, amlodipine was stopped since she is now on flomax at bedtime, and BP is well-controlled. She had a negative venous doppler of b/l LE's on 10/18/18. She did very well in therapy and is being discharged home with home care on October 26, 2018. She will follow up in one week with her PCP Dr. Ariza, also with Dr. Ramirez and with URO-GYN Dr. Shelli Jamison. She received her flu vaccine in September, 2018.

## 2018-10-06 NOTE — DISCHARGE NOTE ADULT - CARE PROVIDER_API CALL
Shelli Jamison), Female Pelvic MedReconst Surg; Obstetrics and Gynecology  [address] [place] [address]  Phone: [phone]  Fax: [phone]    Dagoberto Marion), Urology  [address] [facility]  [address] [place] [address]  Phone: [phone]  Fax: [phone]    HipGuanaco Vargas), Orthopaedic Surgery  [address] [place] [address]  Phone: [phone]  Fax: [phone]    Agustín Ariza (MD), Internal Medicine  [address] [place] [address]  Phone: [phone]  Fax: [phone]

## 2018-10-06 NOTE — DISCHARGE NOTE ADULT - CARE PROVIDERS DIRECT ADDRESSES
[email or web address],[email or web address]

## 2018-10-06 NOTE — DISCHARGE NOTE ADULT - CARE PLAN
Principal Discharge DX:	Hip fracture, left  Goal:	healing of bones and tissues, regain independent function without pain or discomfort  Assessment and plan of treatment:	Continue physical therapy at home. Please do not drive until you follow up with the Orthopedic surgeon, Dr. Ramirez, he will let you know when it is safe for you to begin driving again. Take Tylenol as needed for pain. You can wear the TEDs stockings when you are out of bed, to reduce leg swelling.  Secondary Diagnosis:	Essential hypertension  Goal:	stable blood pressure  Assessment and plan of treatment:	Your blood pressure is well-controlled on your current medications. Amlodipine was stopped because you are on a new medication, flomax (tamsulosin), to help you urinate, this medication also lower your blood pressure. Continue taking atenolol and enalapril, and follow a heart healthy low salt diet. Follow up regularly with your primary care provider (PCP), Dr. Ariza.  Secondary Diagnosis:	Urinary retention  Goal:	to have the catheter removed and be able to urinate on your own, also prevent a urinary tract infection (UTI)  Assessment and plan of treatment:	You have a curran catheter in place to remove the urine. It is very important to keep the catheter and surrounding area very clean, to prevent an infection. Can be cleaned in the shower, also by washing gently with mild soap and water 3 times a day and more often if needed. Follow up with the urologist, Dr. Marion, or if you prefer, you can follow up with the uro-gynecologist, Dr. Shelli Jamison, who will do tests in her office to check your bladder function. In time hopefully the catheter will be able to come out and you will be able to urinate and empty your bladder on your own. You were treated for a UTI with antibiotics (Cipro) for 10 days.

## 2018-10-06 NOTE — DISCHARGE NOTE ADULT - PLAN OF CARE
healing of bones and tissues, regain independent function without pain or discomfort Continue physical therapy at home. Please do not drive until you follow up with the Orthopedic surgeon, Dr. Ramirez, he will let you know when it is safe for you to begin driving again. Take Tylenol as needed for pain. You can wear the TEDs stockings when you are out of bed, to reduce leg swelling. stable blood pressure Your blood pressure is well-controlled on your current medications. Amlodipine was stopped because you are on a new medication, flomax (tamsulosin), to help you urinate, this medication also lower your blood pressure. Continue taking atenolol and enalapril, and follow a heart healthy low salt diet. Follow up regularly with your primary care provider (PCP), Dr. Ariza. to have the catheter removed and be able to urinate on your own, also prevent a urinary tract infection (UTI) You have a curran catheter in place to remove the urine. It is very important to keep the catheter and surrounding area very clean, to prevent an infection. Can be cleaned in the shower, also by washing gently with mild soap and water 3 times a day and more often if needed. Follow up with the urologist, Dr. Marion, or if you prefer, you can follow up with the uro-gynecologist, Dr. Shelli Jamison, who will do tests in her office to check your bladder function. In time hopefully the catheter will be able to come out and you will be able to urinate and empty your bladder on your own. You were treated for a UTI with antibiotics (Cipro) for 10 days.

## 2018-10-06 NOTE — DISCHARGE NOTE ADULT - MEDICATION SUMMARY - MEDICATIONS TO TAKE
I will START or STAY ON the medications listed below when I get home from the hospital:    acetaminophen 325 mg oral tablet  -- 2 tab(s) by mouth every 6 hours, As needed, Temp greater or equal to 38C (100.4F), Mild Pain (1 - 3)  -- Indication: For (Tylenol) take if needed for pain or fever    Aspirin Enteric Coated 81 mg oral delayed release tablet  -- 1 tab(s) by mouth once a day with food  -- Indication: For blood-thinner to protect your heart and brain, take with food    enalapril 10 mg oral tablet  -- 1 tab(s) by mouth once a day  -- Indication: For lowers your blood pressure    aluminum hydroxide-magnesium hydroxide 200 mg-200 mg/5 mL oral suspension  -- 30 milliliter(s) by mouth every 6 hours, As needed, Dyspepsia  -- Indication: For (Maalox or Mylanta) take if needed for indigestion, gas, heartburn, upset stomach    tamsulosin 0.4 mg oral capsule  -- 1 cap(s) by mouth once a day (at bedtime)  -- Indication: For (Flomax) to help you pass urine and empty your bladder, it also lowers your blood pressure    atorvastatin 20 mg oral tablet  -- 1 tab(s) by mouth once a day (at bedtime)  -- Indication: For lowers your cholesterol    atenolol 25 mg oral tablet  -- 1 tab(s) by mouth once a day  -- Indication: For lowers your blood pressure    vitamin A & D topical ointment  -- 1 application on skin 2 times a day to your heels  -- Indication: For you can use any moisturizing lotion you like on your feet and heels to moisturize dry skin    docusate sodium 100 mg oral capsule  -- 1 cap(s) by mouth 3 times a day, As needed, Constipation  -- Indication: For (Colace) stool softener, take 1 to 3 times a day, if needed for constipation    saccharomyces boulardii lyo 250 mg oral capsule  -- 1 cap(s) by mouth 2 times a day  -- Indication: For probiotics for colon health, you were taking them while you were on antibiotics, you can continue to take them if you like, any brand your prefer

## 2018-10-06 NOTE — DISCHARGE NOTE ADULT - NS AS ACTIVITY OBS
Showering allowed/Do not drive or operate machinery/Weight-bearing as tolerated left lower extremity, ambulate with rolling walker/No Heavy lifting/straining

## 2018-10-06 NOTE — DISCHARGE NOTE ADULT - MEDICATION SUMMARY - MEDICATIONS TO STOP TAKING
I will STOP taking the medications listed below when I get home from the hospital:    amLODIPine 5 mg oral tablet  -- 1 tab(s) by mouth once a day (at bedtime)

## 2018-10-06 NOTE — DISCHARGE NOTE ADULT - ADDITIONAL INSTRUCTIONS
**Please show these discharge papers to all your doctors and caregivers**  **Follow up with Dr. Ariza in one week, also with the urogynecologist Dr. Jamison in one week, and with Dr. Ramirez in 2 weeks**

## 2018-10-07 LAB
CULTURE RESULTS: SIGNIFICANT CHANGE UP
SPECIMEN SOURCE: SIGNIFICANT CHANGE UP

## 2018-10-07 RX ADMIN — ATORVASTATIN CALCIUM 20 MILLIGRAM(S): 80 TABLET, FILM COATED ORAL at 21:27

## 2018-10-07 RX ADMIN — ATENOLOL 25 MILLIGRAM(S): 25 TABLET ORAL at 06:31

## 2018-10-07 RX ADMIN — PANTOPRAZOLE SODIUM 40 MILLIGRAM(S): 20 TABLET, DELAYED RELEASE ORAL at 06:30

## 2018-10-07 RX ADMIN — TRAMADOL HYDROCHLORIDE 50 MILLIGRAM(S): 50 TABLET ORAL at 21:29

## 2018-10-07 RX ADMIN — AMLODIPINE BESYLATE 5 MILLIGRAM(S): 2.5 TABLET ORAL at 21:27

## 2018-10-07 RX ADMIN — Medication 30 MILLILITER(S): at 12:04

## 2018-10-07 RX ADMIN — Medication 100 MILLIGRAM(S): at 13:26

## 2018-10-07 RX ADMIN — HEPARIN SODIUM 5000 UNIT(S): 5000 INJECTION INTRAVENOUS; SUBCUTANEOUS at 06:34

## 2018-10-07 RX ADMIN — POLYETHYLENE GLYCOL 3350 17 GRAM(S): 17 POWDER, FOR SOLUTION ORAL at 12:00

## 2018-10-07 RX ADMIN — SENNA PLUS 2 TABLET(S): 8.6 TABLET ORAL at 21:27

## 2018-10-07 RX ADMIN — Medication 10 MILLIGRAM(S): at 06:31

## 2018-10-07 RX ADMIN — TRAMADOL HYDROCHLORIDE 50 MILLIGRAM(S): 50 TABLET ORAL at 14:15

## 2018-10-07 RX ADMIN — TRAMADOL HYDROCHLORIDE 50 MILLIGRAM(S): 50 TABLET ORAL at 06:39

## 2018-10-07 RX ADMIN — HEPARIN SODIUM 5000 UNIT(S): 5000 INJECTION INTRAVENOUS; SUBCUTANEOUS at 17:43

## 2018-10-07 RX ADMIN — TRAMADOL HYDROCHLORIDE 50 MILLIGRAM(S): 50 TABLET ORAL at 06:30

## 2018-10-07 RX ADMIN — Medication 100 MILLIGRAM(S): at 21:27

## 2018-10-07 RX ADMIN — TRAMADOL HYDROCHLORIDE 50 MILLIGRAM(S): 50 TABLET ORAL at 13:25

## 2018-10-07 RX ADMIN — LACTULOSE 20 GRAM(S): 10 SOLUTION ORAL at 12:00

## 2018-10-07 RX ADMIN — Medication 100 MILLIGRAM(S): at 06:31

## 2018-10-07 RX ADMIN — TRAMADOL HYDROCHLORIDE 50 MILLIGRAM(S): 50 TABLET ORAL at 21:48

## 2018-10-08 LAB
ALBUMIN SERPL ELPH-MCNC: 3.1 G/DL — LOW (ref 3.5–5.2)
ALP SERPL-CCNC: 70 U/L — SIGNIFICANT CHANGE UP (ref 30–115)
ALT FLD-CCNC: 25 U/L — SIGNIFICANT CHANGE UP (ref 0–41)
ANION GAP SERPL CALC-SCNC: 11 MMOL/L — SIGNIFICANT CHANGE UP (ref 7–14)
AST SERPL-CCNC: 27 U/L — SIGNIFICANT CHANGE UP (ref 0–41)
BASOPHILS # BLD AUTO: 0.05 K/UL — SIGNIFICANT CHANGE UP (ref 0–0.2)
BASOPHILS NFR BLD AUTO: 0.5 % — SIGNIFICANT CHANGE UP (ref 0–1)
BILIRUB SERPL-MCNC: 0.7 MG/DL — SIGNIFICANT CHANGE UP (ref 0.2–1.2)
BUN SERPL-MCNC: 9 MG/DL — LOW (ref 10–20)
CALCIUM SERPL-MCNC: 9.1 MG/DL — SIGNIFICANT CHANGE UP (ref 8.5–10.1)
CHLORIDE SERPL-SCNC: 99 MMOL/L — SIGNIFICANT CHANGE UP (ref 98–110)
CO2 SERPL-SCNC: 30 MMOL/L — SIGNIFICANT CHANGE UP (ref 17–32)
CREAT SERPL-MCNC: 0.6 MG/DL — LOW (ref 0.7–1.5)
EOSINOPHIL # BLD AUTO: 0.37 K/UL — SIGNIFICANT CHANGE UP (ref 0–0.7)
EOSINOPHIL NFR BLD AUTO: 3.9 % — SIGNIFICANT CHANGE UP (ref 0–8)
GLUCOSE SERPL-MCNC: 117 MG/DL — HIGH (ref 70–99)
HCT VFR BLD CALC: 33.4 % — LOW (ref 37–47)
HGB BLD-MCNC: 11 G/DL — LOW (ref 12–16)
IMM GRANULOCYTES NFR BLD AUTO: 1.3 % — HIGH (ref 0.1–0.3)
LYMPHOCYTES # BLD AUTO: 2.64 K/UL — SIGNIFICANT CHANGE UP (ref 1.2–3.4)
LYMPHOCYTES # BLD AUTO: 27.6 % — SIGNIFICANT CHANGE UP (ref 20.5–51.1)
MAGNESIUM SERPL-MCNC: 2 MG/DL — SIGNIFICANT CHANGE UP (ref 1.8–2.4)
MCHC RBC-ENTMCNC: 29.6 PG — SIGNIFICANT CHANGE UP (ref 27–31)
MCHC RBC-ENTMCNC: 32.9 G/DL — SIGNIFICANT CHANGE UP (ref 32–37)
MCV RBC AUTO: 89.8 FL — SIGNIFICANT CHANGE UP (ref 81–99)
MONOCYTES # BLD AUTO: 1.02 K/UL — HIGH (ref 0.1–0.6)
MONOCYTES NFR BLD AUTO: 10.6 % — HIGH (ref 1.7–9.3)
NEUTROPHILS # BLD AUTO: 5.38 K/UL — SIGNIFICANT CHANGE UP (ref 1.4–6.5)
NEUTROPHILS NFR BLD AUTO: 56.1 % — SIGNIFICANT CHANGE UP (ref 42.2–75.2)
NRBC # BLD: 0 /100 WBCS — SIGNIFICANT CHANGE UP (ref 0–0)
PLATELET # BLD AUTO: 166 K/UL — SIGNIFICANT CHANGE UP (ref 130–400)
POTASSIUM SERPL-MCNC: 5 MMOL/L — SIGNIFICANT CHANGE UP (ref 3.5–5)
POTASSIUM SERPL-SCNC: 5 MMOL/L — SIGNIFICANT CHANGE UP (ref 3.5–5)
PROT SERPL-MCNC: 5.3 G/DL — LOW (ref 6–8)
RBC # BLD: 3.72 M/UL — LOW (ref 4.2–5.4)
RBC # FLD: 12.4 % — SIGNIFICANT CHANGE UP (ref 11.5–14.5)
SODIUM SERPL-SCNC: 140 MMOL/L — SIGNIFICANT CHANGE UP (ref 135–146)
WBC # BLD: 9.58 K/UL — SIGNIFICANT CHANGE UP (ref 4.8–10.8)
WBC # FLD AUTO: 9.58 K/UL — SIGNIFICANT CHANGE UP (ref 4.8–10.8)

## 2018-10-08 RX ORDER — SALICYLIC ACID 0.5 %
1 CLEANSER (GRAM) TOPICAL
Qty: 0 | Refills: 0 | Status: DISCONTINUED | OUTPATIENT
Start: 2018-10-08 | End: 2018-10-26

## 2018-10-08 RX ADMIN — ATENOLOL 25 MILLIGRAM(S): 25 TABLET ORAL at 05:17

## 2018-10-08 RX ADMIN — POLYETHYLENE GLYCOL 3350 17 GRAM(S): 17 POWDER, FOR SOLUTION ORAL at 11:48

## 2018-10-08 RX ADMIN — TRAMADOL HYDROCHLORIDE 50 MILLIGRAM(S): 50 TABLET ORAL at 17:12

## 2018-10-08 RX ADMIN — PANTOPRAZOLE SODIUM 40 MILLIGRAM(S): 20 TABLET, DELAYED RELEASE ORAL at 05:16

## 2018-10-08 RX ADMIN — Medication 100 MILLIGRAM(S): at 21:38

## 2018-10-08 RX ADMIN — SENNA PLUS 2 TABLET(S): 8.6 TABLET ORAL at 21:37

## 2018-10-08 RX ADMIN — Medication 10 MILLIGRAM(S): at 05:23

## 2018-10-08 RX ADMIN — HEPARIN SODIUM 5000 UNIT(S): 5000 INJECTION INTRAVENOUS; SUBCUTANEOUS at 17:13

## 2018-10-08 RX ADMIN — TRAMADOL HYDROCHLORIDE 50 MILLIGRAM(S): 50 TABLET ORAL at 05:19

## 2018-10-08 RX ADMIN — AMLODIPINE BESYLATE 5 MILLIGRAM(S): 2.5 TABLET ORAL at 21:38

## 2018-10-08 RX ADMIN — Medication 1 APPLICATION(S): at 17:14

## 2018-10-08 RX ADMIN — TRAMADOL HYDROCHLORIDE 50 MILLIGRAM(S): 50 TABLET ORAL at 21:41

## 2018-10-08 RX ADMIN — HEPARIN SODIUM 5000 UNIT(S): 5000 INJECTION INTRAVENOUS; SUBCUTANEOUS at 05:17

## 2018-10-08 RX ADMIN — ATORVASTATIN CALCIUM 20 MILLIGRAM(S): 80 TABLET, FILM COATED ORAL at 21:38

## 2018-10-08 RX ADMIN — Medication 100 MILLIGRAM(S): at 05:17

## 2018-10-08 RX ADMIN — TRAMADOL HYDROCHLORIDE 50 MILLIGRAM(S): 50 TABLET ORAL at 06:50

## 2018-10-09 RX ADMIN — ATENOLOL 25 MILLIGRAM(S): 25 TABLET ORAL at 05:53

## 2018-10-09 RX ADMIN — SENNA PLUS 2 TABLET(S): 8.6 TABLET ORAL at 21:11

## 2018-10-09 RX ADMIN — Medication 1 APPLICATION(S): at 17:13

## 2018-10-09 RX ADMIN — Medication 10 MILLIGRAM(S): at 05:53

## 2018-10-09 RX ADMIN — Medication 100 MILLIGRAM(S): at 05:53

## 2018-10-09 RX ADMIN — TRAMADOL HYDROCHLORIDE 50 MILLIGRAM(S): 50 TABLET ORAL at 21:11

## 2018-10-09 RX ADMIN — Medication 100 MILLIGRAM(S): at 21:12

## 2018-10-09 RX ADMIN — AMLODIPINE BESYLATE 5 MILLIGRAM(S): 2.5 TABLET ORAL at 21:12

## 2018-10-09 RX ADMIN — TRAMADOL HYDROCHLORIDE 50 MILLIGRAM(S): 50 TABLET ORAL at 05:57

## 2018-10-09 RX ADMIN — ATORVASTATIN CALCIUM 20 MILLIGRAM(S): 80 TABLET, FILM COATED ORAL at 21:12

## 2018-10-09 RX ADMIN — Medication 1 APPLICATION(S): at 05:57

## 2018-10-09 RX ADMIN — HEPARIN SODIUM 5000 UNIT(S): 5000 INJECTION INTRAVENOUS; SUBCUTANEOUS at 05:53

## 2018-10-09 RX ADMIN — HEPARIN SODIUM 5000 UNIT(S): 5000 INJECTION INTRAVENOUS; SUBCUTANEOUS at 17:14

## 2018-10-09 RX ADMIN — TRAMADOL HYDROCHLORIDE 50 MILLIGRAM(S): 50 TABLET ORAL at 21:12

## 2018-10-09 RX ADMIN — PANTOPRAZOLE SODIUM 40 MILLIGRAM(S): 20 TABLET, DELAYED RELEASE ORAL at 06:02

## 2018-10-10 RX ORDER — TRAMADOL HYDROCHLORIDE 50 MG/1
50 TABLET ORAL EVERY 8 HOURS
Qty: 0 | Refills: 0 | Status: DISCONTINUED | OUTPATIENT
Start: 2018-10-13 | End: 2018-10-17

## 2018-10-10 RX ORDER — OXYCODONE HYDROCHLORIDE 5 MG/1
5 TABLET ORAL EVERY 4 HOURS
Qty: 0 | Refills: 0 | Status: DISCONTINUED | OUTPATIENT
Start: 2018-10-13 | End: 2018-10-17

## 2018-10-10 RX ORDER — OXYCODONE HYDROCHLORIDE 5 MG/1
10 TABLET ORAL EVERY 4 HOURS
Qty: 0 | Refills: 0 | Status: DISCONTINUED | OUTPATIENT
Start: 2018-10-13 | End: 2018-10-17

## 2018-10-10 RX ADMIN — Medication 650 MILLIGRAM(S): at 05:41

## 2018-10-10 RX ADMIN — ATORVASTATIN CALCIUM 20 MILLIGRAM(S): 80 TABLET, FILM COATED ORAL at 22:37

## 2018-10-10 RX ADMIN — HEPARIN SODIUM 5000 UNIT(S): 5000 INJECTION INTRAVENOUS; SUBCUTANEOUS at 17:46

## 2018-10-10 RX ADMIN — Medication 1 APPLICATION(S): at 17:47

## 2018-10-10 RX ADMIN — Medication 1 APPLICATION(S): at 05:42

## 2018-10-10 RX ADMIN — TRAMADOL HYDROCHLORIDE 50 MILLIGRAM(S): 50 TABLET ORAL at 22:38

## 2018-10-10 RX ADMIN — Medication 100 MILLIGRAM(S): at 05:40

## 2018-10-10 RX ADMIN — TRAMADOL HYDROCHLORIDE 50 MILLIGRAM(S): 50 TABLET ORAL at 22:37

## 2018-10-10 RX ADMIN — Medication 650 MILLIGRAM(S): at 17:48

## 2018-10-10 RX ADMIN — Medication 10 MILLIGRAM(S): at 05:40

## 2018-10-10 RX ADMIN — HEPARIN SODIUM 5000 UNIT(S): 5000 INJECTION INTRAVENOUS; SUBCUTANEOUS at 05:41

## 2018-10-10 RX ADMIN — ATENOLOL 25 MILLIGRAM(S): 25 TABLET ORAL at 05:40

## 2018-10-10 RX ADMIN — PANTOPRAZOLE SODIUM 40 MILLIGRAM(S): 20 TABLET, DELAYED RELEASE ORAL at 06:19

## 2018-10-10 RX ADMIN — AMLODIPINE BESYLATE 5 MILLIGRAM(S): 2.5 TABLET ORAL at 22:37

## 2018-10-11 DIAGNOSIS — E78.5 HYPERLIPIDEMIA, UNSPECIFIED: ICD-10-CM
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
## 2018-10-11 DIAGNOSIS — M81.0 AGE-RELATED OSTEOPOROSIS WITHOUT CURRENT PATHOLOGICAL FRACTURE: ICD-10-CM
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
## 2018-10-11 DIAGNOSIS — I10 ESSENTIAL (PRIMARY) HYPERTENSION: ICD-10-CM

## 2018-10-11 DIAGNOSIS — S72.002A FRACTURE OF UNSPECIFIED PART OF NECK OF LEFT FEMUR, INITIAL ENCOUNTER FOR CLOSED FRACTURE: ICD-10-CM

## 2018-10-11 DIAGNOSIS — R91.1 SOLITARY PULMONARY NODULE: ICD-10-CM

## 2018-10-11 LAB
APPEARANCE UR: ABNORMAL
BACTERIA # UR AUTO: ABNORMAL /HPF
BILIRUB UR-MCNC: NEGATIVE — SIGNIFICANT CHANGE UP
COLOR SPEC: YELLOW — SIGNIFICANT CHANGE UP
DIFF PNL FLD: ABNORMAL
EPI CELLS # UR: ABNORMAL /HPF
GLUCOSE UR QL: NEGATIVE MG/DL — SIGNIFICANT CHANGE UP
KETONES UR-MCNC: NEGATIVE — SIGNIFICANT CHANGE UP
LEUKOCYTE ESTERASE UR-ACNC: ABNORMAL
NITRITE UR-MCNC: POSITIVE
PH UR: 8 — SIGNIFICANT CHANGE UP (ref 5–8)
PROT UR-MCNC: 100 MG/DL
RBC CASTS # UR COMP ASSIST: ABNORMAL /HPF
SP GR SPEC: 1.01 — SIGNIFICANT CHANGE UP (ref 1.01–1.03)
UROBILINOGEN FLD QL: 1 MG/DL (ref 0.2–0.2)
WBC UR QL: ABNORMAL /HPF

## 2018-10-11 RX ORDER — CIPROFLOXACIN LACTATE 400MG/40ML
500 VIAL (ML) INTRAVENOUS EVERY 12 HOURS
Qty: 0 | Refills: 0 | Status: DISCONTINUED | OUTPATIENT
Start: 2018-10-11 | End: 2018-10-12

## 2018-10-11 RX ADMIN — ATENOLOL 25 MILLIGRAM(S): 25 TABLET ORAL at 07:03

## 2018-10-11 RX ADMIN — TRAMADOL HYDROCHLORIDE 50 MILLIGRAM(S): 50 TABLET ORAL at 14:34

## 2018-10-11 RX ADMIN — Medication 1 APPLICATION(S): at 07:04

## 2018-10-11 RX ADMIN — Medication 500 MILLIGRAM(S): at 19:44

## 2018-10-11 RX ADMIN — TRAMADOL HYDROCHLORIDE 50 MILLIGRAM(S): 50 TABLET ORAL at 15:03

## 2018-10-11 RX ADMIN — TRAMADOL HYDROCHLORIDE 50 MILLIGRAM(S): 50 TABLET ORAL at 21:25

## 2018-10-11 RX ADMIN — TRAMADOL HYDROCHLORIDE 50 MILLIGRAM(S): 50 TABLET ORAL at 07:56

## 2018-10-11 RX ADMIN — HEPARIN SODIUM 5000 UNIT(S): 5000 INJECTION INTRAVENOUS; SUBCUTANEOUS at 17:19

## 2018-10-11 RX ADMIN — TRAMADOL HYDROCHLORIDE 50 MILLIGRAM(S): 50 TABLET ORAL at 08:35

## 2018-10-11 RX ADMIN — AMLODIPINE BESYLATE 5 MILLIGRAM(S): 2.5 TABLET ORAL at 21:22

## 2018-10-11 RX ADMIN — TRAMADOL HYDROCHLORIDE 50 MILLIGRAM(S): 50 TABLET ORAL at 21:24

## 2018-10-11 RX ADMIN — Medication 100 MILLIGRAM(S): at 07:03

## 2018-10-11 RX ADMIN — HEPARIN SODIUM 5000 UNIT(S): 5000 INJECTION INTRAVENOUS; SUBCUTANEOUS at 07:03

## 2018-10-11 RX ADMIN — ATORVASTATIN CALCIUM 20 MILLIGRAM(S): 80 TABLET, FILM COATED ORAL at 21:22

## 2018-10-11 RX ADMIN — Medication 10 MILLIGRAM(S): at 07:03

## 2018-10-11 RX ADMIN — Medication 1 APPLICATION(S): at 21:29

## 2018-10-11 RX ADMIN — PANTOPRAZOLE SODIUM 40 MILLIGRAM(S): 20 TABLET, DELAYED RELEASE ORAL at 07:03

## 2018-10-12 LAB
ANION GAP SERPL CALC-SCNC: 16 MMOL/L — HIGH (ref 7–14)
BASOPHILS # BLD AUTO: 0.06 K/UL — SIGNIFICANT CHANGE UP (ref 0–0.2)
BASOPHILS NFR BLD AUTO: 0.5 % — SIGNIFICANT CHANGE UP (ref 0–1)
BUN SERPL-MCNC: 13 MG/DL — SIGNIFICANT CHANGE UP (ref 10–20)
CALCIUM SERPL-MCNC: 9.8 MG/DL — SIGNIFICANT CHANGE UP (ref 8.5–10.1)
CHLORIDE SERPL-SCNC: 95 MMOL/L — LOW (ref 98–110)
CO2 SERPL-SCNC: 25 MMOL/L — SIGNIFICANT CHANGE UP (ref 17–32)
CREAT SERPL-MCNC: 0.6 MG/DL — LOW (ref 0.7–1.5)
CULTURE RESULTS: SIGNIFICANT CHANGE UP
EOSINOPHIL # BLD AUTO: 0.31 K/UL — SIGNIFICANT CHANGE UP (ref 0–0.7)
EOSINOPHIL NFR BLD AUTO: 2.5 % — SIGNIFICANT CHANGE UP (ref 0–8)
GLUCOSE SERPL-MCNC: 115 MG/DL — HIGH (ref 70–99)
HCT VFR BLD CALC: 34.6 % — LOW (ref 37–47)
HGB BLD-MCNC: 11.3 G/DL — LOW (ref 12–16)
IMM GRANULOCYTES NFR BLD AUTO: 2.1 % — HIGH (ref 0.1–0.3)
LYMPHOCYTES # BLD AUTO: 26.3 % — SIGNIFICANT CHANGE UP (ref 20.5–51.1)
LYMPHOCYTES # BLD AUTO: 3.25 K/UL — SIGNIFICANT CHANGE UP (ref 1.2–3.4)
MAGNESIUM SERPL-MCNC: 1.8 MG/DL — SIGNIFICANT CHANGE UP (ref 1.8–2.4)
MCHC RBC-ENTMCNC: 29.7 PG — SIGNIFICANT CHANGE UP (ref 27–31)
MCHC RBC-ENTMCNC: 32.7 G/DL — SIGNIFICANT CHANGE UP (ref 32–37)
MCV RBC AUTO: 90.8 FL — SIGNIFICANT CHANGE UP (ref 81–99)
MONOCYTES # BLD AUTO: 1.13 K/UL — HIGH (ref 0.1–0.6)
MONOCYTES NFR BLD AUTO: 9.1 % — SIGNIFICANT CHANGE UP (ref 1.7–9.3)
NEUTROPHILS # BLD AUTO: 7.36 K/UL — HIGH (ref 1.4–6.5)
NEUTROPHILS NFR BLD AUTO: 59.5 % — SIGNIFICANT CHANGE UP (ref 42.2–75.2)
NRBC # BLD: 0 /100 WBCS — SIGNIFICANT CHANGE UP (ref 0–0)
PLATELET # BLD AUTO: 208 K/UL — SIGNIFICANT CHANGE UP (ref 130–400)
POTASSIUM SERPL-MCNC: 4.8 MMOL/L — SIGNIFICANT CHANGE UP (ref 3.5–5)
POTASSIUM SERPL-SCNC: 4.8 MMOL/L — SIGNIFICANT CHANGE UP (ref 3.5–5)
RBC # BLD: 3.81 M/UL — LOW (ref 4.2–5.4)
RBC # FLD: 12.6 % — SIGNIFICANT CHANGE UP (ref 11.5–14.5)
SODIUM SERPL-SCNC: 136 MMOL/L — SIGNIFICANT CHANGE UP (ref 135–146)
SPECIMEN SOURCE: SIGNIFICANT CHANGE UP
WBC # BLD: 12.37 K/UL — HIGH (ref 4.8–10.8)
WBC # FLD AUTO: 12.37 K/UL — HIGH (ref 4.8–10.8)

## 2018-10-12 RX ORDER — SACCHAROMYCES BOULARDII 250 MG
250 POWDER IN PACKET (EA) ORAL
Qty: 0 | Refills: 0 | Status: DISCONTINUED | OUTPATIENT
Start: 2018-10-12 | End: 2018-10-26

## 2018-10-12 RX ORDER — CIPROFLOXACIN LACTATE 400MG/40ML
500 VIAL (ML) INTRAVENOUS EVERY 12 HOURS
Qty: 0 | Refills: 0 | Status: COMPLETED | OUTPATIENT
Start: 2018-10-12 | End: 2018-10-22

## 2018-10-12 RX ADMIN — Medication 500 MILLIGRAM(S): at 05:49

## 2018-10-12 RX ADMIN — POLYETHYLENE GLYCOL 3350 17 GRAM(S): 17 POWDER, FOR SOLUTION ORAL at 11:37

## 2018-10-12 RX ADMIN — TRAMADOL HYDROCHLORIDE 50 MILLIGRAM(S): 50 TABLET ORAL at 05:52

## 2018-10-12 RX ADMIN — Medication 100 MILLIGRAM(S): at 13:42

## 2018-10-12 RX ADMIN — HEPARIN SODIUM 5000 UNIT(S): 5000 INJECTION INTRAVENOUS; SUBCUTANEOUS at 18:33

## 2018-10-12 RX ADMIN — Medication 500 MILLIGRAM(S): at 18:33

## 2018-10-12 RX ADMIN — Medication 1 APPLICATION(S): at 05:57

## 2018-10-12 RX ADMIN — TRAMADOL HYDROCHLORIDE 50 MILLIGRAM(S): 50 TABLET ORAL at 13:42

## 2018-10-12 RX ADMIN — TRAMADOL HYDROCHLORIDE 50 MILLIGRAM(S): 50 TABLET ORAL at 05:57

## 2018-10-12 RX ADMIN — TRAMADOL HYDROCHLORIDE 50 MILLIGRAM(S): 50 TABLET ORAL at 22:56

## 2018-10-12 RX ADMIN — HEPARIN SODIUM 5000 UNIT(S): 5000 INJECTION INTRAVENOUS; SUBCUTANEOUS at 05:49

## 2018-10-12 RX ADMIN — PANTOPRAZOLE SODIUM 40 MILLIGRAM(S): 20 TABLET, DELAYED RELEASE ORAL at 05:48

## 2018-10-12 RX ADMIN — Medication 100 MILLIGRAM(S): at 05:49

## 2018-10-12 RX ADMIN — TRAMADOL HYDROCHLORIDE 50 MILLIGRAM(S): 50 TABLET ORAL at 14:25

## 2018-10-12 RX ADMIN — Medication 10 MILLIGRAM(S): at 05:49

## 2018-10-12 RX ADMIN — TRAMADOL HYDROCHLORIDE 50 MILLIGRAM(S): 50 TABLET ORAL at 22:55

## 2018-10-12 RX ADMIN — AMLODIPINE BESYLATE 5 MILLIGRAM(S): 2.5 TABLET ORAL at 22:55

## 2018-10-12 RX ADMIN — Medication 1 APPLICATION(S): at 18:34

## 2018-10-12 RX ADMIN — ATORVASTATIN CALCIUM 20 MILLIGRAM(S): 80 TABLET, FILM COATED ORAL at 22:55

## 2018-10-12 RX ADMIN — Medication 250 MILLIGRAM(S): at 19:35

## 2018-10-12 RX ADMIN — Medication 100 MILLIGRAM(S): at 22:55

## 2018-10-12 RX ADMIN — ATENOLOL 25 MILLIGRAM(S): 25 TABLET ORAL at 05:49

## 2018-10-13 RX ADMIN — LACTULOSE 20 GRAM(S): 10 SOLUTION ORAL at 18:01

## 2018-10-13 RX ADMIN — Medication 500 MILLIGRAM(S): at 06:34

## 2018-10-13 RX ADMIN — Medication 250 MILLIGRAM(S): at 06:38

## 2018-10-13 RX ADMIN — TRAMADOL HYDROCHLORIDE 50 MILLIGRAM(S): 50 TABLET ORAL at 21:44

## 2018-10-13 RX ADMIN — PANTOPRAZOLE SODIUM 40 MILLIGRAM(S): 20 TABLET, DELAYED RELEASE ORAL at 06:34

## 2018-10-13 RX ADMIN — HEPARIN SODIUM 5000 UNIT(S): 5000 INJECTION INTRAVENOUS; SUBCUTANEOUS at 06:35

## 2018-10-13 RX ADMIN — ATORVASTATIN CALCIUM 20 MILLIGRAM(S): 80 TABLET, FILM COATED ORAL at 21:44

## 2018-10-13 RX ADMIN — Medication 1 APPLICATION(S): at 16:58

## 2018-10-13 RX ADMIN — ATENOLOL 25 MILLIGRAM(S): 25 TABLET ORAL at 06:34

## 2018-10-13 RX ADMIN — Medication 1 APPLICATION(S): at 06:36

## 2018-10-13 RX ADMIN — Medication 10 MILLIGRAM(S): at 06:34

## 2018-10-13 RX ADMIN — TRAMADOL HYDROCHLORIDE 50 MILLIGRAM(S): 50 TABLET ORAL at 06:38

## 2018-10-13 RX ADMIN — HEPARIN SODIUM 5000 UNIT(S): 5000 INJECTION INTRAVENOUS; SUBCUTANEOUS at 16:58

## 2018-10-13 RX ADMIN — POLYETHYLENE GLYCOL 3350 17 GRAM(S): 17 POWDER, FOR SOLUTION ORAL at 12:55

## 2018-10-13 RX ADMIN — Medication 500 MILLIGRAM(S): at 16:57

## 2018-10-13 RX ADMIN — Medication 250 MILLIGRAM(S): at 18:02

## 2018-10-13 RX ADMIN — Medication 100 MILLIGRAM(S): at 06:34

## 2018-10-13 RX ADMIN — TRAMADOL HYDROCHLORIDE 50 MILLIGRAM(S): 50 TABLET ORAL at 13:05

## 2018-10-13 RX ADMIN — Medication 100 MILLIGRAM(S): at 12:57

## 2018-10-13 RX ADMIN — AMLODIPINE BESYLATE 5 MILLIGRAM(S): 2.5 TABLET ORAL at 21:44

## 2018-10-13 RX ADMIN — TRAMADOL HYDROCHLORIDE 50 MILLIGRAM(S): 50 TABLET ORAL at 13:06

## 2018-10-14 LAB
ALBUMIN SERPL ELPH-MCNC: 3.2 G/DL — LOW (ref 3.5–5.2)
ALP SERPL-CCNC: 79 U/L — SIGNIFICANT CHANGE UP (ref 30–115)
ALT FLD-CCNC: 55 U/L — HIGH (ref 0–41)
ANION GAP SERPL CALC-SCNC: 16 MMOL/L — HIGH (ref 7–14)
AST SERPL-CCNC: 45 U/L — HIGH (ref 0–41)
BASOPHILS # BLD AUTO: 0.04 K/UL — SIGNIFICANT CHANGE UP (ref 0–0.2)
BASOPHILS NFR BLD AUTO: 0.4 % — SIGNIFICANT CHANGE UP (ref 0–1)
BILIRUB SERPL-MCNC: 0.4 MG/DL — SIGNIFICANT CHANGE UP (ref 0.2–1.2)
BUN SERPL-MCNC: 12 MG/DL — SIGNIFICANT CHANGE UP (ref 10–20)
CALCIUM SERPL-MCNC: 9.4 MG/DL — SIGNIFICANT CHANGE UP (ref 8.5–10.1)
CHLORIDE SERPL-SCNC: 96 MMOL/L — LOW (ref 98–110)
CO2 SERPL-SCNC: 23 MMOL/L — SIGNIFICANT CHANGE UP (ref 17–32)
CREAT SERPL-MCNC: 0.6 MG/DL — LOW (ref 0.7–1.5)
EOSINOPHIL # BLD AUTO: 0.23 K/UL — SIGNIFICANT CHANGE UP (ref 0–0.7)
EOSINOPHIL NFR BLD AUTO: 2.3 % — SIGNIFICANT CHANGE UP (ref 0–8)
GLUCOSE SERPL-MCNC: 122 MG/DL — HIGH (ref 70–99)
HCT VFR BLD CALC: 33.5 % — LOW (ref 37–47)
HGB BLD-MCNC: 10.9 G/DL — LOW (ref 12–16)
IMM GRANULOCYTES NFR BLD AUTO: 2.2 % — HIGH (ref 0.1–0.3)
LYMPHOCYTES # BLD AUTO: 2.92 K/UL — SIGNIFICANT CHANGE UP (ref 1.2–3.4)
LYMPHOCYTES # BLD AUTO: 29.2 % — SIGNIFICANT CHANGE UP (ref 20.5–51.1)
MCHC RBC-ENTMCNC: 29.5 PG — SIGNIFICANT CHANGE UP (ref 27–31)
MCHC RBC-ENTMCNC: 32.5 G/DL — SIGNIFICANT CHANGE UP (ref 32–37)
MCV RBC AUTO: 90.5 FL — SIGNIFICANT CHANGE UP (ref 81–99)
MONOCYTES # BLD AUTO: 1.1 K/UL — HIGH (ref 0.1–0.6)
MONOCYTES NFR BLD AUTO: 11 % — HIGH (ref 1.7–9.3)
NEUTROPHILS # BLD AUTO: 5.5 K/UL — SIGNIFICANT CHANGE UP (ref 1.4–6.5)
NEUTROPHILS NFR BLD AUTO: 54.9 % — SIGNIFICANT CHANGE UP (ref 42.2–75.2)
PLATELET # BLD AUTO: 197 K/UL — SIGNIFICANT CHANGE UP (ref 130–400)
POTASSIUM SERPL-MCNC: 4.8 MMOL/L — SIGNIFICANT CHANGE UP (ref 3.5–5)
POTASSIUM SERPL-SCNC: 4.8 MMOL/L — SIGNIFICANT CHANGE UP (ref 3.5–5)
PROT SERPL-MCNC: 5.9 G/DL — LOW (ref 6–8)
RBC # BLD: 3.7 M/UL — LOW (ref 4.2–5.4)
RBC # FLD: 12.4 % — SIGNIFICANT CHANGE UP (ref 11.5–14.5)
SODIUM SERPL-SCNC: 135 MMOL/L — SIGNIFICANT CHANGE UP (ref 135–146)
WBC # BLD: 10.01 K/UL — SIGNIFICANT CHANGE UP (ref 4.8–10.8)
WBC # FLD AUTO: 10.01 K/UL — SIGNIFICANT CHANGE UP (ref 4.8–10.8)

## 2018-10-14 RX ADMIN — Medication 500 MILLIGRAM(S): at 17:08

## 2018-10-14 RX ADMIN — Medication 1 APPLICATION(S): at 17:08

## 2018-10-14 RX ADMIN — PANTOPRAZOLE SODIUM 40 MILLIGRAM(S): 20 TABLET, DELAYED RELEASE ORAL at 06:06

## 2018-10-14 RX ADMIN — HEPARIN SODIUM 5000 UNIT(S): 5000 INJECTION INTRAVENOUS; SUBCUTANEOUS at 06:06

## 2018-10-14 RX ADMIN — TRAMADOL HYDROCHLORIDE 50 MILLIGRAM(S): 50 TABLET ORAL at 06:16

## 2018-10-14 RX ADMIN — Medication 500 MILLIGRAM(S): at 06:06

## 2018-10-14 RX ADMIN — Medication 10 MILLIGRAM(S): at 06:06

## 2018-10-14 RX ADMIN — Medication 250 MILLIGRAM(S): at 17:09

## 2018-10-14 RX ADMIN — TRAMADOL HYDROCHLORIDE 50 MILLIGRAM(S): 50 TABLET ORAL at 06:17

## 2018-10-14 RX ADMIN — Medication 250 MILLIGRAM(S): at 06:13

## 2018-10-14 RX ADMIN — ATENOLOL 25 MILLIGRAM(S): 25 TABLET ORAL at 06:06

## 2018-10-14 RX ADMIN — Medication 1 APPLICATION(S): at 06:13

## 2018-10-14 RX ADMIN — TRAMADOL HYDROCHLORIDE 50 MILLIGRAM(S): 50 TABLET ORAL at 13:39

## 2018-10-14 RX ADMIN — TRAMADOL HYDROCHLORIDE 50 MILLIGRAM(S): 50 TABLET ORAL at 14:56

## 2018-10-14 RX ADMIN — HEPARIN SODIUM 5000 UNIT(S): 5000 INJECTION INTRAVENOUS; SUBCUTANEOUS at 17:09

## 2018-10-14 RX ADMIN — AMLODIPINE BESYLATE 5 MILLIGRAM(S): 2.5 TABLET ORAL at 21:44

## 2018-10-14 RX ADMIN — TRAMADOL HYDROCHLORIDE 50 MILLIGRAM(S): 50 TABLET ORAL at 21:50

## 2018-10-14 RX ADMIN — ATORVASTATIN CALCIUM 20 MILLIGRAM(S): 80 TABLET, FILM COATED ORAL at 21:44

## 2018-10-14 RX ADMIN — TRAMADOL HYDROCHLORIDE 50 MILLIGRAM(S): 50 TABLET ORAL at 21:48

## 2018-10-15 LAB
ALBUMIN SERPL ELPH-MCNC: 3.2 G/DL — LOW (ref 3.5–5.2)
ALP SERPL-CCNC: 75 U/L — SIGNIFICANT CHANGE UP (ref 30–115)
ALT FLD-CCNC: 44 U/L — HIGH (ref 0–41)
ANION GAP SERPL CALC-SCNC: 14 MMOL/L — SIGNIFICANT CHANGE UP (ref 7–14)
AST SERPL-CCNC: 31 U/L — SIGNIFICANT CHANGE UP (ref 0–41)
BASOPHILS # BLD AUTO: 0.04 K/UL — SIGNIFICANT CHANGE UP (ref 0–0.2)
BASOPHILS NFR BLD AUTO: 0.5 % — SIGNIFICANT CHANGE UP (ref 0–1)
BILIRUB SERPL-MCNC: 0.5 MG/DL — SIGNIFICANT CHANGE UP (ref 0.2–1.2)
BUN SERPL-MCNC: 15 MG/DL — SIGNIFICANT CHANGE UP (ref 10–20)
CALCIUM SERPL-MCNC: 9.1 MG/DL — SIGNIFICANT CHANGE UP (ref 8.5–10.1)
CHLORIDE SERPL-SCNC: 97 MMOL/L — LOW (ref 98–110)
CO2 SERPL-SCNC: 24 MMOL/L — SIGNIFICANT CHANGE UP (ref 17–32)
CREAT SERPL-MCNC: 0.7 MG/DL — SIGNIFICANT CHANGE UP (ref 0.7–1.5)
EOSINOPHIL # BLD AUTO: 0.23 K/UL — SIGNIFICANT CHANGE UP (ref 0–0.7)
EOSINOPHIL NFR BLD AUTO: 2.6 % — SIGNIFICANT CHANGE UP (ref 0–8)
GLUCOSE SERPL-MCNC: 115 MG/DL — HIGH (ref 70–99)
HCT VFR BLD CALC: 30.2 % — LOW (ref 37–47)
HGB BLD-MCNC: 10.1 G/DL — LOW (ref 12–16)
IMM GRANULOCYTES NFR BLD AUTO: 2.1 % — HIGH (ref 0.1–0.3)
LYMPHOCYTES # BLD AUTO: 2.55 K/UL — SIGNIFICANT CHANGE UP (ref 1.2–3.4)
LYMPHOCYTES # BLD AUTO: 29.3 % — SIGNIFICANT CHANGE UP (ref 20.5–51.1)
MAGNESIUM SERPL-MCNC: 1.8 MG/DL — SIGNIFICANT CHANGE UP (ref 1.8–2.4)
MCHC RBC-ENTMCNC: 30 PG — SIGNIFICANT CHANGE UP (ref 27–31)
MCHC RBC-ENTMCNC: 33.4 G/DL — SIGNIFICANT CHANGE UP (ref 32–37)
MCV RBC AUTO: 89.6 FL — SIGNIFICANT CHANGE UP (ref 81–99)
MONOCYTES # BLD AUTO: 1 K/UL — HIGH (ref 0.1–0.6)
MONOCYTES NFR BLD AUTO: 11.5 % — HIGH (ref 1.7–9.3)
NEUTROPHILS # BLD AUTO: 4.71 K/UL — SIGNIFICANT CHANGE UP (ref 1.4–6.5)
NEUTROPHILS NFR BLD AUTO: 54 % — SIGNIFICANT CHANGE UP (ref 42.2–75.2)
NRBC # BLD: 0 /100 WBCS — SIGNIFICANT CHANGE UP (ref 0–0)
PLATELET # BLD AUTO: 203 K/UL — SIGNIFICANT CHANGE UP (ref 130–400)
POTASSIUM SERPL-MCNC: 4.3 MMOL/L — SIGNIFICANT CHANGE UP (ref 3.5–5)
POTASSIUM SERPL-SCNC: 4.3 MMOL/L — SIGNIFICANT CHANGE UP (ref 3.5–5)
PROT SERPL-MCNC: 5.4 G/DL — LOW (ref 6–8)
RBC # BLD: 3.37 M/UL — LOW (ref 4.2–5.4)
RBC # FLD: 12.5 % — SIGNIFICANT CHANGE UP (ref 11.5–14.5)
SODIUM SERPL-SCNC: 135 MMOL/L — SIGNIFICANT CHANGE UP (ref 135–146)
WBC # BLD: 8.71 K/UL — SIGNIFICANT CHANGE UP (ref 4.8–10.8)
WBC # FLD AUTO: 8.71 K/UL — SIGNIFICANT CHANGE UP (ref 4.8–10.8)

## 2018-10-15 RX ADMIN — ATENOLOL 25 MILLIGRAM(S): 25 TABLET ORAL at 05:58

## 2018-10-15 RX ADMIN — Medication 250 MILLIGRAM(S): at 17:05

## 2018-10-15 RX ADMIN — TRAMADOL HYDROCHLORIDE 50 MILLIGRAM(S): 50 TABLET ORAL at 21:27

## 2018-10-15 RX ADMIN — Medication 1 APPLICATION(S): at 05:58

## 2018-10-15 RX ADMIN — TRAMADOL HYDROCHLORIDE 50 MILLIGRAM(S): 50 TABLET ORAL at 14:29

## 2018-10-15 RX ADMIN — Medication 1 APPLICATION(S): at 17:06

## 2018-10-15 RX ADMIN — HEPARIN SODIUM 5000 UNIT(S): 5000 INJECTION INTRAVENOUS; SUBCUTANEOUS at 17:06

## 2018-10-15 RX ADMIN — TRAMADOL HYDROCHLORIDE 50 MILLIGRAM(S): 50 TABLET ORAL at 06:05

## 2018-10-15 RX ADMIN — Medication 500 MILLIGRAM(S): at 17:05

## 2018-10-15 RX ADMIN — SENNA PLUS 2 TABLET(S): 8.6 TABLET ORAL at 21:27

## 2018-10-15 RX ADMIN — Medication 500 MILLIGRAM(S): at 05:57

## 2018-10-15 RX ADMIN — Medication 10 MILLIGRAM(S): at 05:57

## 2018-10-15 RX ADMIN — PANTOPRAZOLE SODIUM 40 MILLIGRAM(S): 20 TABLET, DELAYED RELEASE ORAL at 05:57

## 2018-10-15 RX ADMIN — TRAMADOL HYDROCHLORIDE 50 MILLIGRAM(S): 50 TABLET ORAL at 06:06

## 2018-10-15 RX ADMIN — ATORVASTATIN CALCIUM 20 MILLIGRAM(S): 80 TABLET, FILM COATED ORAL at 21:27

## 2018-10-15 RX ADMIN — TRAMADOL HYDROCHLORIDE 50 MILLIGRAM(S): 50 TABLET ORAL at 17:08

## 2018-10-15 RX ADMIN — HEPARIN SODIUM 5000 UNIT(S): 5000 INJECTION INTRAVENOUS; SUBCUTANEOUS at 05:58

## 2018-10-15 RX ADMIN — Medication 250 MILLIGRAM(S): at 05:59

## 2018-10-15 RX ADMIN — Medication 100 MILLIGRAM(S): at 21:27

## 2018-10-15 RX ADMIN — AMLODIPINE BESYLATE 5 MILLIGRAM(S): 2.5 TABLET ORAL at 21:27

## 2018-10-16 RX ORDER — TAMSULOSIN HYDROCHLORIDE 0.4 MG/1
0.4 CAPSULE ORAL AT BEDTIME
Qty: 0 | Refills: 0 | Status: DISCONTINUED | OUTPATIENT
Start: 2018-10-16 | End: 2018-10-26

## 2018-10-16 RX ADMIN — Medication 250 MILLIGRAM(S): at 06:06

## 2018-10-16 RX ADMIN — HEPARIN SODIUM 5000 UNIT(S): 5000 INJECTION INTRAVENOUS; SUBCUTANEOUS at 17:19

## 2018-10-16 RX ADMIN — Medication 1 APPLICATION(S): at 06:03

## 2018-10-16 RX ADMIN — Medication 250 MILLIGRAM(S): at 17:19

## 2018-10-16 RX ADMIN — Medication 500 MILLIGRAM(S): at 17:19

## 2018-10-16 RX ADMIN — Medication 1 APPLICATION(S): at 19:42

## 2018-10-16 RX ADMIN — Medication 650 MILLIGRAM(S): at 13:16

## 2018-10-16 RX ADMIN — TRAMADOL HYDROCHLORIDE 50 MILLIGRAM(S): 50 TABLET ORAL at 06:06

## 2018-10-16 RX ADMIN — Medication 10 MILLIGRAM(S): at 06:04

## 2018-10-16 RX ADMIN — TAMSULOSIN HYDROCHLORIDE 0.4 MILLIGRAM(S): 0.4 CAPSULE ORAL at 21:16

## 2018-10-16 RX ADMIN — HEPARIN SODIUM 5000 UNIT(S): 5000 INJECTION INTRAVENOUS; SUBCUTANEOUS at 06:03

## 2018-10-16 RX ADMIN — Medication 650 MILLIGRAM(S): at 12:56

## 2018-10-16 RX ADMIN — ATENOLOL 25 MILLIGRAM(S): 25 TABLET ORAL at 06:04

## 2018-10-16 RX ADMIN — Medication 100 MILLIGRAM(S): at 06:04

## 2018-10-16 RX ADMIN — Medication 650 MILLIGRAM(S): at 22:28

## 2018-10-16 RX ADMIN — Medication 500 MILLIGRAM(S): at 06:04

## 2018-10-16 RX ADMIN — Medication 650 MILLIGRAM(S): at 22:29

## 2018-10-16 RX ADMIN — PANTOPRAZOLE SODIUM 40 MILLIGRAM(S): 20 TABLET, DELAYED RELEASE ORAL at 06:05

## 2018-10-16 RX ADMIN — ATORVASTATIN CALCIUM 20 MILLIGRAM(S): 80 TABLET, FILM COATED ORAL at 21:16

## 2018-10-17 LAB
APPEARANCE UR: CLEAR — SIGNIFICANT CHANGE UP
BASOPHILS # BLD AUTO: 0.04 K/UL — SIGNIFICANT CHANGE UP (ref 0–0.2)
BASOPHILS NFR BLD AUTO: 0.5 % — SIGNIFICANT CHANGE UP (ref 0–1)
BILIRUB UR-MCNC: NEGATIVE — SIGNIFICANT CHANGE UP
COLOR SPEC: YELLOW — SIGNIFICANT CHANGE UP
DIFF PNL FLD: ABNORMAL
EOSINOPHIL # BLD AUTO: 0.16 K/UL — SIGNIFICANT CHANGE UP (ref 0–0.7)
EOSINOPHIL NFR BLD AUTO: 2.2 % — SIGNIFICANT CHANGE UP (ref 0–8)
GLUCOSE UR QL: NEGATIVE MG/DL — SIGNIFICANT CHANGE UP
HCT VFR BLD CALC: 31 % — LOW (ref 37–47)
HGB BLD-MCNC: 10.2 G/DL — LOW (ref 12–16)
IMM GRANULOCYTES NFR BLD AUTO: 1.2 % — HIGH (ref 0.1–0.3)
KETONES UR-MCNC: NEGATIVE — SIGNIFICANT CHANGE UP
LEUKOCYTE ESTERASE UR-ACNC: ABNORMAL
LYMPHOCYTES # BLD AUTO: 2.05 K/UL — SIGNIFICANT CHANGE UP (ref 1.2–3.4)
LYMPHOCYTES # BLD AUTO: 28.1 % — SIGNIFICANT CHANGE UP (ref 20.5–51.1)
MCHC RBC-ENTMCNC: 29.5 PG — SIGNIFICANT CHANGE UP (ref 27–31)
MCHC RBC-ENTMCNC: 32.9 G/DL — SIGNIFICANT CHANGE UP (ref 32–37)
MCV RBC AUTO: 89.6 FL — SIGNIFICANT CHANGE UP (ref 81–99)
MONOCYTES # BLD AUTO: 0.81 K/UL — HIGH (ref 0.1–0.6)
MONOCYTES NFR BLD AUTO: 11.1 % — HIGH (ref 1.7–9.3)
NEUTROPHILS # BLD AUTO: 4.15 K/UL — SIGNIFICANT CHANGE UP (ref 1.4–6.5)
NEUTROPHILS NFR BLD AUTO: 56.9 % — SIGNIFICANT CHANGE UP (ref 42.2–75.2)
NITRITE UR-MCNC: NEGATIVE — SIGNIFICANT CHANGE UP
NRBC # BLD: 0 /100 WBCS — SIGNIFICANT CHANGE UP (ref 0–0)
PH UR: 7 — SIGNIFICANT CHANGE UP (ref 5–8)
PLATELET # BLD AUTO: 212 K/UL — SIGNIFICANT CHANGE UP (ref 130–400)
PROT UR-MCNC: NEGATIVE MG/DL — SIGNIFICANT CHANGE UP
RBC # BLD: 3.46 M/UL — LOW (ref 4.2–5.4)
RBC # FLD: 12.4 % — SIGNIFICANT CHANGE UP (ref 11.5–14.5)
RBC CASTS # UR COMP ASSIST: ABNORMAL /HPF
SP GR SPEC: 1.01 — SIGNIFICANT CHANGE UP (ref 1.01–1.03)
UROBILINOGEN FLD QL: 0.2 MG/DL — SIGNIFICANT CHANGE UP (ref 0.2–0.2)
WBC # BLD: 7.3 K/UL — SIGNIFICANT CHANGE UP (ref 4.8–10.8)
WBC # FLD AUTO: 7.3 K/UL — SIGNIFICANT CHANGE UP (ref 4.8–10.8)
WBC UR QL: SIGNIFICANT CHANGE UP /HPF

## 2018-10-17 RX ORDER — POLYETHYLENE GLYCOL 3350 17 G/17G
17 POWDER, FOR SOLUTION ORAL DAILY
Qty: 0 | Refills: 0 | Status: DISCONTINUED | OUTPATIENT
Start: 2018-10-17 | End: 2018-10-26

## 2018-10-17 RX ORDER — LACTULOSE 10 G/15ML
20 SOLUTION ORAL DAILY
Qty: 0 | Refills: 0 | Status: DISCONTINUED | OUTPATIENT
Start: 2018-10-17 | End: 2018-10-26

## 2018-10-17 RX ORDER — DOCUSATE SODIUM 100 MG
100 CAPSULE ORAL THREE TIMES A DAY
Qty: 0 | Refills: 0 | Status: DISCONTINUED | OUTPATIENT
Start: 2018-10-17 | End: 2018-10-26

## 2018-10-17 RX ORDER — TRAMADOL HYDROCHLORIDE 50 MG/1
50 TABLET ORAL EVERY 8 HOURS
Qty: 0 | Refills: 0 | Status: DISCONTINUED | OUTPATIENT
Start: 2018-10-17 | End: 2018-10-17

## 2018-10-17 RX ADMIN — Medication 500 MILLIGRAM(S): at 17:15

## 2018-10-17 RX ADMIN — TAMSULOSIN HYDROCHLORIDE 0.4 MILLIGRAM(S): 0.4 CAPSULE ORAL at 21:14

## 2018-10-17 RX ADMIN — Medication 10 MILLIGRAM(S): at 06:14

## 2018-10-17 RX ADMIN — ATORVASTATIN CALCIUM 20 MILLIGRAM(S): 80 TABLET, FILM COATED ORAL at 21:14

## 2018-10-17 RX ADMIN — HEPARIN SODIUM 5000 UNIT(S): 5000 INJECTION INTRAVENOUS; SUBCUTANEOUS at 17:15

## 2018-10-17 RX ADMIN — Medication 650 MILLIGRAM(S): at 21:14

## 2018-10-17 RX ADMIN — HEPARIN SODIUM 5000 UNIT(S): 5000 INJECTION INTRAVENOUS; SUBCUTANEOUS at 06:14

## 2018-10-17 RX ADMIN — Medication 650 MILLIGRAM(S): at 08:01

## 2018-10-17 RX ADMIN — Medication 650 MILLIGRAM(S): at 22:00

## 2018-10-17 RX ADMIN — Medication 650 MILLIGRAM(S): at 10:38

## 2018-10-17 RX ADMIN — PANTOPRAZOLE SODIUM 40 MILLIGRAM(S): 20 TABLET, DELAYED RELEASE ORAL at 06:14

## 2018-10-17 RX ADMIN — Medication 1 APPLICATION(S): at 18:12

## 2018-10-17 RX ADMIN — Medication 650 MILLIGRAM(S): at 15:24

## 2018-10-17 RX ADMIN — Medication 250 MILLIGRAM(S): at 18:13

## 2018-10-17 RX ADMIN — Medication 250 MILLIGRAM(S): at 06:15

## 2018-10-17 RX ADMIN — Medication 650 MILLIGRAM(S): at 15:26

## 2018-10-17 RX ADMIN — Medication 500 MILLIGRAM(S): at 06:14

## 2018-10-17 RX ADMIN — Medication 1 APPLICATION(S): at 06:14

## 2018-10-17 RX ADMIN — ATENOLOL 25 MILLIGRAM(S): 25 TABLET ORAL at 06:14

## 2018-10-18 LAB
ALBUMIN SERPL ELPH-MCNC: 3.4 G/DL — LOW (ref 3.5–5.2)
ALP SERPL-CCNC: 77 U/L — SIGNIFICANT CHANGE UP (ref 30–115)
ALT FLD-CCNC: 26 U/L — SIGNIFICANT CHANGE UP (ref 0–41)
ANION GAP SERPL CALC-SCNC: 16 MMOL/L — HIGH (ref 7–14)
AST SERPL-CCNC: 17 U/L — SIGNIFICANT CHANGE UP (ref 0–41)
BASOPHILS # BLD AUTO: 0.03 K/UL — SIGNIFICANT CHANGE UP (ref 0–0.2)
BASOPHILS NFR BLD AUTO: 0.5 % — SIGNIFICANT CHANGE UP (ref 0–1)
BILIRUB SERPL-MCNC: 0.3 MG/DL — SIGNIFICANT CHANGE UP (ref 0.2–1.2)
BUN SERPL-MCNC: 11 MG/DL — SIGNIFICANT CHANGE UP (ref 10–20)
CALCIUM SERPL-MCNC: 9.2 MG/DL — SIGNIFICANT CHANGE UP (ref 8.5–10.1)
CHLORIDE SERPL-SCNC: 102 MMOL/L — SIGNIFICANT CHANGE UP (ref 98–110)
CO2 SERPL-SCNC: 24 MMOL/L — SIGNIFICANT CHANGE UP (ref 17–32)
CREAT SERPL-MCNC: 0.6 MG/DL — LOW (ref 0.7–1.5)
EOSINOPHIL # BLD AUTO: 0.08 K/UL — SIGNIFICANT CHANGE UP (ref 0–0.7)
EOSINOPHIL NFR BLD AUTO: 1.3 % — SIGNIFICANT CHANGE UP (ref 0–8)
GLUCOSE SERPL-MCNC: 135 MG/DL — HIGH (ref 70–99)
HCT VFR BLD CALC: 29.5 % — LOW (ref 37–47)
HGB BLD-MCNC: 9.8 G/DL — LOW (ref 12–16)
IMM GRANULOCYTES NFR BLD AUTO: 0.8 % — HIGH (ref 0.1–0.3)
LYMPHOCYTES # BLD AUTO: 1.64 K/UL — SIGNIFICANT CHANGE UP (ref 1.2–3.4)
LYMPHOCYTES # BLD AUTO: 27.1 % — SIGNIFICANT CHANGE UP (ref 20.5–51.1)
MAGNESIUM SERPL-MCNC: 1.8 MG/DL — SIGNIFICANT CHANGE UP (ref 1.8–2.4)
MCHC RBC-ENTMCNC: 29.4 PG — SIGNIFICANT CHANGE UP (ref 27–31)
MCHC RBC-ENTMCNC: 33.2 G/DL — SIGNIFICANT CHANGE UP (ref 32–37)
MCV RBC AUTO: 88.6 FL — SIGNIFICANT CHANGE UP (ref 81–99)
MONOCYTES # BLD AUTO: 0.67 K/UL — HIGH (ref 0.1–0.6)
MONOCYTES NFR BLD AUTO: 11.1 % — HIGH (ref 1.7–9.3)
NEUTROPHILS # BLD AUTO: 3.58 K/UL — SIGNIFICANT CHANGE UP (ref 1.4–6.5)
NEUTROPHILS NFR BLD AUTO: 59.2 % — SIGNIFICANT CHANGE UP (ref 42.2–75.2)
NRBC # BLD: 0 /100 WBCS — SIGNIFICANT CHANGE UP (ref 0–0)
PLATELET # BLD AUTO: 228 K/UL — SIGNIFICANT CHANGE UP (ref 130–400)
POTASSIUM SERPL-MCNC: 3.8 MMOL/L — SIGNIFICANT CHANGE UP (ref 3.5–5)
POTASSIUM SERPL-SCNC: 3.8 MMOL/L — SIGNIFICANT CHANGE UP (ref 3.5–5)
PROT SERPL-MCNC: 5.6 G/DL — LOW (ref 6–8)
RBC # BLD: 3.33 M/UL — LOW (ref 4.2–5.4)
RBC # FLD: 12.3 % — SIGNIFICANT CHANGE UP (ref 11.5–14.5)
SODIUM SERPL-SCNC: 142 MMOL/L — SIGNIFICANT CHANGE UP (ref 135–146)
WBC # BLD: 6.05 K/UL — SIGNIFICANT CHANGE UP (ref 4.8–10.8)
WBC # FLD AUTO: 6.05 K/UL — SIGNIFICANT CHANGE UP (ref 4.8–10.8)

## 2018-10-18 RX ADMIN — Medication 1 APPLICATION(S): at 18:29

## 2018-10-18 RX ADMIN — HEPARIN SODIUM 5000 UNIT(S): 5000 INJECTION INTRAVENOUS; SUBCUTANEOUS at 18:27

## 2018-10-18 RX ADMIN — Medication 650 MILLIGRAM(S): at 06:23

## 2018-10-18 RX ADMIN — Medication 650 MILLIGRAM(S): at 13:20

## 2018-10-18 RX ADMIN — ATENOLOL 25 MILLIGRAM(S): 25 TABLET ORAL at 06:19

## 2018-10-18 RX ADMIN — TAMSULOSIN HYDROCHLORIDE 0.4 MILLIGRAM(S): 0.4 CAPSULE ORAL at 21:23

## 2018-10-18 RX ADMIN — PANTOPRAZOLE SODIUM 40 MILLIGRAM(S): 20 TABLET, DELAYED RELEASE ORAL at 06:22

## 2018-10-18 RX ADMIN — ATORVASTATIN CALCIUM 20 MILLIGRAM(S): 80 TABLET, FILM COATED ORAL at 21:23

## 2018-10-18 RX ADMIN — Medication 250 MILLIGRAM(S): at 06:24

## 2018-10-18 RX ADMIN — Medication 1 APPLICATION(S): at 06:21

## 2018-10-18 RX ADMIN — Medication 500 MILLIGRAM(S): at 18:27

## 2018-10-18 RX ADMIN — Medication 650 MILLIGRAM(S): at 06:24

## 2018-10-18 RX ADMIN — HEPARIN SODIUM 5000 UNIT(S): 5000 INJECTION INTRAVENOUS; SUBCUTANEOUS at 06:19

## 2018-10-18 RX ADMIN — Medication 500 MILLIGRAM(S): at 06:18

## 2018-10-18 RX ADMIN — Medication 10 MILLIGRAM(S): at 06:22

## 2018-10-18 RX ADMIN — Medication 250 MILLIGRAM(S): at 18:31

## 2018-10-18 RX ADMIN — Medication 650 MILLIGRAM(S): at 12:32

## 2018-10-19 PROCEDURE — 93970 EXTREMITY STUDY: CPT | Mod: 26

## 2018-10-19 RX ADMIN — Medication 650 MILLIGRAM(S): at 23:33

## 2018-10-19 RX ADMIN — Medication 650 MILLIGRAM(S): at 15:30

## 2018-10-19 RX ADMIN — Medication 650 MILLIGRAM(S): at 06:43

## 2018-10-19 RX ADMIN — Medication 1 APPLICATION(S): at 17:49

## 2018-10-19 RX ADMIN — ATENOLOL 25 MILLIGRAM(S): 25 TABLET ORAL at 06:13

## 2018-10-19 RX ADMIN — Medication 500 MILLIGRAM(S): at 06:13

## 2018-10-19 RX ADMIN — Medication 650 MILLIGRAM(S): at 06:44

## 2018-10-19 RX ADMIN — HEPARIN SODIUM 5000 UNIT(S): 5000 INJECTION INTRAVENOUS; SUBCUTANEOUS at 06:20

## 2018-10-19 RX ADMIN — Medication 10 MILLIGRAM(S): at 06:14

## 2018-10-19 RX ADMIN — PANTOPRAZOLE SODIUM 40 MILLIGRAM(S): 20 TABLET, DELAYED RELEASE ORAL at 06:13

## 2018-10-19 RX ADMIN — HEPARIN SODIUM 5000 UNIT(S): 5000 INJECTION INTRAVENOUS; SUBCUTANEOUS at 17:49

## 2018-10-19 RX ADMIN — Medication 1 APPLICATION(S): at 06:20

## 2018-10-19 RX ADMIN — TAMSULOSIN HYDROCHLORIDE 0.4 MILLIGRAM(S): 0.4 CAPSULE ORAL at 21:22

## 2018-10-19 RX ADMIN — Medication 500 MILLIGRAM(S): at 17:49

## 2018-10-19 RX ADMIN — Medication 250 MILLIGRAM(S): at 17:49

## 2018-10-19 RX ADMIN — ATORVASTATIN CALCIUM 20 MILLIGRAM(S): 80 TABLET, FILM COATED ORAL at 21:22

## 2018-10-19 RX ADMIN — Medication 250 MILLIGRAM(S): at 06:20

## 2018-10-20 RX ADMIN — HEPARIN SODIUM 5000 UNIT(S): 5000 INJECTION INTRAVENOUS; SUBCUTANEOUS at 17:10

## 2018-10-20 RX ADMIN — PANTOPRAZOLE SODIUM 40 MILLIGRAM(S): 20 TABLET, DELAYED RELEASE ORAL at 06:19

## 2018-10-20 RX ADMIN — HEPARIN SODIUM 5000 UNIT(S): 5000 INJECTION INTRAVENOUS; SUBCUTANEOUS at 06:19

## 2018-10-20 RX ADMIN — Medication 650 MILLIGRAM(S): at 22:40

## 2018-10-20 RX ADMIN — Medication 500 MILLIGRAM(S): at 17:10

## 2018-10-20 RX ADMIN — Medication 1 APPLICATION(S): at 17:10

## 2018-10-20 RX ADMIN — Medication 500 MILLIGRAM(S): at 06:19

## 2018-10-20 RX ADMIN — Medication 650 MILLIGRAM(S): at 13:51

## 2018-10-20 RX ADMIN — Medication 250 MILLIGRAM(S): at 06:20

## 2018-10-20 RX ADMIN — Medication 650 MILLIGRAM(S): at 09:59

## 2018-10-20 RX ADMIN — Medication 250 MILLIGRAM(S): at 17:08

## 2018-10-20 RX ADMIN — ATENOLOL 25 MILLIGRAM(S): 25 TABLET ORAL at 06:19

## 2018-10-20 RX ADMIN — TAMSULOSIN HYDROCHLORIDE 0.4 MILLIGRAM(S): 0.4 CAPSULE ORAL at 21:13

## 2018-10-20 RX ADMIN — ATORVASTATIN CALCIUM 20 MILLIGRAM(S): 80 TABLET, FILM COATED ORAL at 21:13

## 2018-10-20 RX ADMIN — Medication 10 MILLIGRAM(S): at 06:19

## 2018-10-21 RX ADMIN — HEPARIN SODIUM 5000 UNIT(S): 5000 INJECTION INTRAVENOUS; SUBCUTANEOUS at 17:02

## 2018-10-21 RX ADMIN — Medication 1 APPLICATION(S): at 17:02

## 2018-10-21 RX ADMIN — HEPARIN SODIUM 5000 UNIT(S): 5000 INJECTION INTRAVENOUS; SUBCUTANEOUS at 05:08

## 2018-10-21 RX ADMIN — Medication 250 MILLIGRAM(S): at 05:07

## 2018-10-21 RX ADMIN — Medication 650 MILLIGRAM(S): at 12:15

## 2018-10-21 RX ADMIN — Medication 500 MILLIGRAM(S): at 17:03

## 2018-10-21 RX ADMIN — ATORVASTATIN CALCIUM 20 MILLIGRAM(S): 80 TABLET, FILM COATED ORAL at 21:01

## 2018-10-21 RX ADMIN — ATENOLOL 25 MILLIGRAM(S): 25 TABLET ORAL at 05:07

## 2018-10-21 RX ADMIN — Medication 1 APPLICATION(S): at 05:08

## 2018-10-21 RX ADMIN — Medication 10 MILLIGRAM(S): at 05:09

## 2018-10-21 RX ADMIN — Medication 250 MILLIGRAM(S): at 17:02

## 2018-10-21 RX ADMIN — Medication 500 MILLIGRAM(S): at 05:07

## 2018-10-21 RX ADMIN — Medication 650 MILLIGRAM(S): at 21:57

## 2018-10-21 RX ADMIN — PANTOPRAZOLE SODIUM 40 MILLIGRAM(S): 20 TABLET, DELAYED RELEASE ORAL at 05:02

## 2018-10-21 RX ADMIN — TAMSULOSIN HYDROCHLORIDE 0.4 MILLIGRAM(S): 0.4 CAPSULE ORAL at 21:01

## 2018-10-22 LAB
ALBUMIN SERPL ELPH-MCNC: 3.1 G/DL — LOW (ref 3.5–5.2)
ALP SERPL-CCNC: 87 U/L — SIGNIFICANT CHANGE UP (ref 30–115)
ALT FLD-CCNC: 32 U/L — SIGNIFICANT CHANGE UP (ref 0–41)
ANION GAP SERPL CALC-SCNC: 16 MMOL/L — HIGH (ref 7–14)
AST SERPL-CCNC: 28 U/L — SIGNIFICANT CHANGE UP (ref 0–41)
BASOPHILS # BLD AUTO: 0.04 K/UL — SIGNIFICANT CHANGE UP (ref 0–0.2)
BASOPHILS NFR BLD AUTO: 0.8 % — SIGNIFICANT CHANGE UP (ref 0–1)
BILIRUB SERPL-MCNC: 0.3 MG/DL — SIGNIFICANT CHANGE UP (ref 0.2–1.2)
BUN SERPL-MCNC: 15 MG/DL — SIGNIFICANT CHANGE UP (ref 10–20)
CALCIUM SERPL-MCNC: 8.9 MG/DL — SIGNIFICANT CHANGE UP (ref 8.5–10.1)
CHLORIDE SERPL-SCNC: 100 MMOL/L — SIGNIFICANT CHANGE UP (ref 98–110)
CO2 SERPL-SCNC: 23 MMOL/L — SIGNIFICANT CHANGE UP (ref 17–32)
CREAT SERPL-MCNC: 0.7 MG/DL — SIGNIFICANT CHANGE UP (ref 0.7–1.5)
EOSINOPHIL # BLD AUTO: 0.14 K/UL — SIGNIFICANT CHANGE UP (ref 0–0.7)
EOSINOPHIL NFR BLD AUTO: 2.8 % — SIGNIFICANT CHANGE UP (ref 0–8)
GLUCOSE SERPL-MCNC: 141 MG/DL — HIGH (ref 70–99)
HCT VFR BLD CALC: 29.7 % — LOW (ref 37–47)
HGB BLD-MCNC: 10 G/DL — LOW (ref 12–16)
IMM GRANULOCYTES NFR BLD AUTO: 0.8 % — HIGH (ref 0.1–0.3)
LYMPHOCYTES # BLD AUTO: 1.66 K/UL — SIGNIFICANT CHANGE UP (ref 1.2–3.4)
LYMPHOCYTES # BLD AUTO: 33.1 % — SIGNIFICANT CHANGE UP (ref 20.5–51.1)
MAGNESIUM SERPL-MCNC: 1.8 MG/DL — SIGNIFICANT CHANGE UP (ref 1.8–2.4)
MCHC RBC-ENTMCNC: 29.7 PG — SIGNIFICANT CHANGE UP (ref 27–31)
MCHC RBC-ENTMCNC: 33.7 G/DL — SIGNIFICANT CHANGE UP (ref 32–37)
MCV RBC AUTO: 88.1 FL — SIGNIFICANT CHANGE UP (ref 81–99)
MONOCYTES # BLD AUTO: 0.63 K/UL — HIGH (ref 0.1–0.6)
MONOCYTES NFR BLD AUTO: 12.6 % — HIGH (ref 1.7–9.3)
NEUTROPHILS # BLD AUTO: 2.5 K/UL — SIGNIFICANT CHANGE UP (ref 1.4–6.5)
NEUTROPHILS NFR BLD AUTO: 49.9 % — SIGNIFICANT CHANGE UP (ref 42.2–75.2)
NRBC # BLD: 0 /100 WBCS — SIGNIFICANT CHANGE UP (ref 0–0)
PLATELET # BLD AUTO: 255 K/UL — SIGNIFICANT CHANGE UP (ref 130–400)
POTASSIUM SERPL-MCNC: 4.2 MMOL/L — SIGNIFICANT CHANGE UP (ref 3.5–5)
POTASSIUM SERPL-SCNC: 4.2 MMOL/L — SIGNIFICANT CHANGE UP (ref 3.5–5)
PROT SERPL-MCNC: 5.4 G/DL — LOW (ref 6–8)
RBC # BLD: 3.37 M/UL — LOW (ref 4.2–5.4)
RBC # FLD: 12.4 % — SIGNIFICANT CHANGE UP (ref 11.5–14.5)
SODIUM SERPL-SCNC: 139 MMOL/L — SIGNIFICANT CHANGE UP (ref 135–146)
WBC # BLD: 5.01 K/UL — SIGNIFICANT CHANGE UP (ref 4.8–10.8)
WBC # FLD AUTO: 5.01 K/UL — SIGNIFICANT CHANGE UP (ref 4.8–10.8)

## 2018-10-22 RX ORDER — CIPROFLOXACIN LACTATE 400MG/40ML
500 VIAL (ML) INTRAVENOUS EVERY 12 HOURS
Qty: 0 | Refills: 0 | Status: COMPLETED | OUTPATIENT
Start: 2018-10-22 | End: 2018-10-23

## 2018-10-22 RX ORDER — TRAMADOL HYDROCHLORIDE 50 MG/1
50 TABLET ORAL EVERY 8 HOURS
Qty: 0 | Refills: 0 | Status: DISCONTINUED | OUTPATIENT
Start: 2018-10-25 | End: 2018-10-26

## 2018-10-22 RX ADMIN — TAMSULOSIN HYDROCHLORIDE 0.4 MILLIGRAM(S): 0.4 CAPSULE ORAL at 21:30

## 2018-10-22 RX ADMIN — ATENOLOL 25 MILLIGRAM(S): 25 TABLET ORAL at 05:36

## 2018-10-22 RX ADMIN — ATORVASTATIN CALCIUM 20 MILLIGRAM(S): 80 TABLET, FILM COATED ORAL at 21:30

## 2018-10-22 RX ADMIN — HEPARIN SODIUM 5000 UNIT(S): 5000 INJECTION INTRAVENOUS; SUBCUTANEOUS at 17:49

## 2018-10-22 RX ADMIN — Medication 500 MILLIGRAM(S): at 05:36

## 2018-10-22 RX ADMIN — PANTOPRAZOLE SODIUM 40 MILLIGRAM(S): 20 TABLET, DELAYED RELEASE ORAL at 05:36

## 2018-10-22 RX ADMIN — Medication 650 MILLIGRAM(S): at 23:50

## 2018-10-22 RX ADMIN — Medication 650 MILLIGRAM(S): at 08:09

## 2018-10-22 RX ADMIN — Medication 250 MILLIGRAM(S): at 17:52

## 2018-10-22 RX ADMIN — Medication 500 MILLIGRAM(S): at 17:49

## 2018-10-22 RX ADMIN — Medication 250 MILLIGRAM(S): at 05:36

## 2018-10-22 RX ADMIN — Medication 1 APPLICATION(S): at 17:49

## 2018-10-22 RX ADMIN — HEPARIN SODIUM 5000 UNIT(S): 5000 INJECTION INTRAVENOUS; SUBCUTANEOUS at 05:36

## 2018-10-22 RX ADMIN — Medication 10 MILLIGRAM(S): at 05:36

## 2018-10-22 RX ADMIN — Medication 1 APPLICATION(S): at 05:35

## 2018-10-22 RX ADMIN — Medication 650 MILLIGRAM(S): at 22:20

## 2018-10-23 RX ADMIN — Medication 650 MILLIGRAM(S): at 15:00

## 2018-10-23 RX ADMIN — Medication 250 MILLIGRAM(S): at 05:52

## 2018-10-23 RX ADMIN — Medication 1 APPLICATION(S): at 05:51

## 2018-10-23 RX ADMIN — Medication 650 MILLIGRAM(S): at 08:11

## 2018-10-23 RX ADMIN — Medication 1 APPLICATION(S): at 18:24

## 2018-10-23 RX ADMIN — PANTOPRAZOLE SODIUM 40 MILLIGRAM(S): 20 TABLET, DELAYED RELEASE ORAL at 06:35

## 2018-10-23 RX ADMIN — ATORVASTATIN CALCIUM 20 MILLIGRAM(S): 80 TABLET, FILM COATED ORAL at 21:34

## 2018-10-23 RX ADMIN — HEPARIN SODIUM 5000 UNIT(S): 5000 INJECTION INTRAVENOUS; SUBCUTANEOUS at 18:16

## 2018-10-23 RX ADMIN — ATENOLOL 25 MILLIGRAM(S): 25 TABLET ORAL at 05:50

## 2018-10-23 RX ADMIN — Medication 10 MILLIGRAM(S): at 05:50

## 2018-10-23 RX ADMIN — HEPARIN SODIUM 5000 UNIT(S): 5000 INJECTION INTRAVENOUS; SUBCUTANEOUS at 05:53

## 2018-10-23 RX ADMIN — Medication 250 MILLIGRAM(S): at 18:24

## 2018-10-23 RX ADMIN — Medication 500 MILLIGRAM(S): at 18:16

## 2018-10-23 RX ADMIN — Medication 500 MILLIGRAM(S): at 05:50

## 2018-10-23 RX ADMIN — TAMSULOSIN HYDROCHLORIDE 0.4 MILLIGRAM(S): 0.4 CAPSULE ORAL at 21:35

## 2018-10-24 RX ADMIN — Medication 10 MILLIGRAM(S): at 06:03

## 2018-10-24 RX ADMIN — Medication 650 MILLIGRAM(S): at 23:46

## 2018-10-24 RX ADMIN — ATENOLOL 25 MILLIGRAM(S): 25 TABLET ORAL at 06:03

## 2018-10-24 RX ADMIN — Medication 250 MILLIGRAM(S): at 06:06

## 2018-10-24 RX ADMIN — HEPARIN SODIUM 5000 UNIT(S): 5000 INJECTION INTRAVENOUS; SUBCUTANEOUS at 06:03

## 2018-10-24 RX ADMIN — HEPARIN SODIUM 5000 UNIT(S): 5000 INJECTION INTRAVENOUS; SUBCUTANEOUS at 17:25

## 2018-10-24 RX ADMIN — Medication 650 MILLIGRAM(S): at 09:03

## 2018-10-24 RX ADMIN — Medication 1 APPLICATION(S): at 06:05

## 2018-10-24 RX ADMIN — Medication 1 APPLICATION(S): at 17:26

## 2018-10-24 RX ADMIN — Medication 250 MILLIGRAM(S): at 17:26

## 2018-10-24 RX ADMIN — Medication 650 MILLIGRAM(S): at 17:25

## 2018-10-24 RX ADMIN — TAMSULOSIN HYDROCHLORIDE 0.4 MILLIGRAM(S): 0.4 CAPSULE ORAL at 21:10

## 2018-10-24 RX ADMIN — Medication 650 MILLIGRAM(S): at 08:23

## 2018-10-24 RX ADMIN — ATORVASTATIN CALCIUM 20 MILLIGRAM(S): 80 TABLET, FILM COATED ORAL at 21:10

## 2018-10-24 RX ADMIN — PANTOPRAZOLE SODIUM 40 MILLIGRAM(S): 20 TABLET, DELAYED RELEASE ORAL at 06:03

## 2018-10-25 RX ADMIN — Medication 250 MILLIGRAM(S): at 05:30

## 2018-10-25 RX ADMIN — Medication 1 APPLICATION(S): at 05:31

## 2018-10-25 RX ADMIN — Medication 650 MILLIGRAM(S): at 07:51

## 2018-10-25 RX ADMIN — Medication 650 MILLIGRAM(S): at 17:44

## 2018-10-25 RX ADMIN — Medication 1 APPLICATION(S): at 17:40

## 2018-10-25 RX ADMIN — TAMSULOSIN HYDROCHLORIDE 0.4 MILLIGRAM(S): 0.4 CAPSULE ORAL at 21:32

## 2018-10-25 RX ADMIN — Medication 250 MILLIGRAM(S): at 17:47

## 2018-10-25 RX ADMIN — HEPARIN SODIUM 5000 UNIT(S): 5000 INJECTION INTRAVENOUS; SUBCUTANEOUS at 17:41

## 2018-10-25 RX ADMIN — ATENOLOL 25 MILLIGRAM(S): 25 TABLET ORAL at 05:30

## 2018-10-25 RX ADMIN — Medication 10 MILLIGRAM(S): at 05:31

## 2018-10-25 RX ADMIN — PANTOPRAZOLE SODIUM 40 MILLIGRAM(S): 20 TABLET, DELAYED RELEASE ORAL at 05:31

## 2018-10-25 RX ADMIN — HEPARIN SODIUM 5000 UNIT(S): 5000 INJECTION INTRAVENOUS; SUBCUTANEOUS at 05:30

## 2018-10-25 RX ADMIN — ATORVASTATIN CALCIUM 20 MILLIGRAM(S): 80 TABLET, FILM COATED ORAL at 21:32

## 2018-10-26 RX ORDER — DOCUSATE SODIUM 100 MG
1 CAPSULE ORAL
Qty: 0 | Refills: 0 | COMMUNITY
Start: 2018-10-26

## 2018-10-26 RX ORDER — ATORVASTATIN CALCIUM 80 MG/1
1 TABLET, FILM COATED ORAL
Qty: 0 | Refills: 0 | COMMUNITY

## 2018-10-26 RX ORDER — ACETAMINOPHEN 500 MG
2 TABLET ORAL
Qty: 0 | Refills: 0 | COMMUNITY
Start: 2018-10-26

## 2018-10-26 RX ORDER — ASPIRIN/CALCIUM CARB/MAGNESIUM 324 MG
1 TABLET ORAL
Qty: 0 | Refills: 0 | COMMUNITY

## 2018-10-26 RX ORDER — AMLODIPINE BESYLATE 2.5 MG/1
1 TABLET ORAL
Qty: 0 | Refills: 0 | COMMUNITY

## 2018-10-26 RX ORDER — TAMSULOSIN HYDROCHLORIDE 0.4 MG/1
1 CAPSULE ORAL
Qty: 30 | Refills: 0 | OUTPATIENT
Start: 2018-10-26 | End: 2018-11-24

## 2018-10-26 RX ORDER — SACCHAROMYCES BOULARDII 250 MG
1 POWDER IN PACKET (EA) ORAL
Qty: 0 | Refills: 0 | COMMUNITY
Start: 2018-10-26

## 2018-10-26 RX ADMIN — ATENOLOL 25 MILLIGRAM(S): 25 TABLET ORAL at 05:43

## 2018-10-26 RX ADMIN — Medication 1 APPLICATION(S): at 05:41

## 2018-10-26 RX ADMIN — PANTOPRAZOLE SODIUM 40 MILLIGRAM(S): 20 TABLET, DELAYED RELEASE ORAL at 05:43

## 2018-10-26 RX ADMIN — Medication 650 MILLIGRAM(S): at 09:44

## 2018-10-26 RX ADMIN — Medication 10 MILLIGRAM(S): at 05:43

## 2018-10-26 RX ADMIN — HEPARIN SODIUM 5000 UNIT(S): 5000 INJECTION INTRAVENOUS; SUBCUTANEOUS at 05:43

## 2018-10-26 RX ADMIN — Medication 250 MILLIGRAM(S): at 05:43

## 2018-10-30 DIAGNOSIS — S72.042D DISPLACED FRACTURE OF BASE OF NECK OF LEFT FEMUR, SUBSEQUENT ENCOUNTER FOR CLOSED FRACTURE WITH ROUTINE HEALING: ICD-10-CM

## 2018-10-30 DIAGNOSIS — K59.00 CONSTIPATION, UNSPECIFIED: ICD-10-CM
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
## 2018-10-30 DIAGNOSIS — M81.0 AGE-RELATED OSTEOPOROSIS WITHOUT CURRENT PATHOLOGICAL FRACTURE: ICD-10-CM

## 2018-10-30 DIAGNOSIS — Z96.642 PRESENCE OF LEFT ARTIFICIAL HIP JOINT: ICD-10-CM

## 2018-10-30 DIAGNOSIS — I10 ESSENTIAL (PRIMARY) HYPERTENSION: ICD-10-CM

## 2018-10-30 DIAGNOSIS — R33.8 OTHER RETENTION OF URINE: ICD-10-CM

## 2018-10-30 DIAGNOSIS — E78.00 PURE HYPERCHOLESTEROLEMIA, UNSPECIFIED: ICD-10-CM

## 2018-10-30 DIAGNOSIS — N39.0 URINARY TRACT INFECTION, SITE NOT SPECIFIED: ICD-10-CM

## 2018-10-31 PROBLEM — Z00.00 ENCOUNTER FOR PREVENTIVE HEALTH EXAMINATION: Status: ACTIVE | Noted: 2018-10-31

## 2018-11-06 ENCOUNTER — EMERGENCY (EMERGENCY)
Facility: HOSPITAL | Age: 83
LOS: 1 days | Discharge: HOME | End: 2018-11-06
Attending: EMERGENCY MEDICINE | Admitting: EMERGENCY MEDICINE

## 2018-11-06 ENCOUNTER — APPOINTMENT (OUTPATIENT)
Dept: UROLOGY | Facility: CLINIC | Age: 83
End: 2018-11-06
Payer: MEDICARE

## 2018-11-06 VITALS
SYSTOLIC BLOOD PRESSURE: 142 MMHG | DIASTOLIC BLOOD PRESSURE: 64 MMHG | WEIGHT: 136.03 LBS | HEIGHT: 60 IN | OXYGEN SATURATION: 98 % | HEART RATE: 93 BPM | TEMPERATURE: 97 F | RESPIRATION RATE: 18 BRPM

## 2018-11-06 VITALS
HEIGHT: 60 IN | SYSTOLIC BLOOD PRESSURE: 147 MMHG | HEART RATE: 91 BPM | BODY MASS INDEX: 26.7 KG/M2 | DIASTOLIC BLOOD PRESSURE: 79 MMHG | WEIGHT: 136 LBS

## 2018-11-06 DIAGNOSIS — N32.89 OTHER SPECIFIED DISORDERS OF BLADDER: ICD-10-CM

## 2018-11-06 DIAGNOSIS — Z90.710 ACQUIRED ABSENCE OF BOTH CERVIX AND UTERUS: Chronic | ICD-10-CM

## 2018-11-06 DIAGNOSIS — Z82.49 FAMILY HISTORY OF ISCHEMIC HEART DISEASE AND OTHER DISEASES OF THE CIRCULATORY SYSTEM: ICD-10-CM

## 2018-11-06 DIAGNOSIS — Z82.3 FAMILY HISTORY OF STROKE: ICD-10-CM

## 2018-11-06 DIAGNOSIS — Z98.890 OTHER SPECIFIED POSTPROCEDURAL STATES: ICD-10-CM

## 2018-11-06 DIAGNOSIS — Z96.642 PRESENCE OF LEFT ARTIFICIAL HIP JOINT: ICD-10-CM

## 2018-11-06 DIAGNOSIS — E78.00 PURE HYPERCHOLESTEROLEMIA, UNSPECIFIED: ICD-10-CM

## 2018-11-06 DIAGNOSIS — R33.9 RETENTION OF URINE, UNSPECIFIED: ICD-10-CM

## 2018-11-06 DIAGNOSIS — Z79.82 LONG TERM (CURRENT) USE OF ASPIRIN: ICD-10-CM

## 2018-11-06 DIAGNOSIS — Z80.0 FAMILY HISTORY OF MALIGNANT NEOPLASM OF DIGESTIVE ORGANS: ICD-10-CM

## 2018-11-06 DIAGNOSIS — Z79.899 OTHER LONG TERM (CURRENT) DRUG THERAPY: ICD-10-CM

## 2018-11-06 DIAGNOSIS — Z82.5 FAMILY HISTORY OF ASTHMA AND OTHER CHRONIC LOWER RESPIRATORY DISEASES: ICD-10-CM

## 2018-11-06 DIAGNOSIS — N39.0 URINARY TRACT INFECTION, SITE NOT SPECIFIED: ICD-10-CM

## 2018-11-06 DIAGNOSIS — Z90.710 ACQUIRED ABSENCE OF BOTH CERVIX AND UTERUS: ICD-10-CM

## 2018-11-06 DIAGNOSIS — Z96.642 PRESENCE OF LEFT ARTIFICIAL HIP JOINT: Chronic | ICD-10-CM

## 2018-11-06 DIAGNOSIS — I10 ESSENTIAL (PRIMARY) HYPERTENSION: ICD-10-CM

## 2018-11-06 DIAGNOSIS — Z98.890 OTHER SPECIFIED POSTPROCEDURAL STATES: Chronic | ICD-10-CM

## 2018-11-06 LAB
ALBUMIN SERPL ELPH-MCNC: 3.6 G/DL — SIGNIFICANT CHANGE UP (ref 3.5–5.2)
ALP SERPL-CCNC: 96 U/L — SIGNIFICANT CHANGE UP (ref 30–115)
ALT FLD-CCNC: 16 U/L — SIGNIFICANT CHANGE UP (ref 0–41)
ANION GAP SERPL CALC-SCNC: 18 MMOL/L — HIGH (ref 7–14)
APPEARANCE UR: ABNORMAL
AST SERPL-CCNC: 17 U/L — SIGNIFICANT CHANGE UP (ref 0–41)
BILIRUB SERPL-MCNC: 0.4 MG/DL — SIGNIFICANT CHANGE UP (ref 0.2–1.2)
BILIRUB UR-MCNC: NEGATIVE — SIGNIFICANT CHANGE UP
BUN SERPL-MCNC: 23 MG/DL — HIGH (ref 10–20)
CALCIUM SERPL-MCNC: 9.6 MG/DL — SIGNIFICANT CHANGE UP (ref 8.5–10.1)
CHLORIDE SERPL-SCNC: 93 MMOL/L — LOW (ref 98–110)
CO2 SERPL-SCNC: 19 MMOL/L — SIGNIFICANT CHANGE UP (ref 17–32)
COLOR SPEC: YELLOW — SIGNIFICANT CHANGE UP
CREAT SERPL-MCNC: 0.9 MG/DL — SIGNIFICANT CHANGE UP (ref 0.7–1.5)
DIFF PNL FLD: ABNORMAL
GLUCOSE SERPL-MCNC: 112 MG/DL — HIGH (ref 70–99)
GLUCOSE UR QL: NEGATIVE MG/DL — SIGNIFICANT CHANGE UP
KETONES UR-MCNC: NEGATIVE — SIGNIFICANT CHANGE UP
LEUKOCYTE ESTERASE UR-ACNC: ABNORMAL
NITRITE UR-MCNC: NEGATIVE — SIGNIFICANT CHANGE UP
PH UR: 6.5 — SIGNIFICANT CHANGE UP (ref 5–8)
POTASSIUM SERPL-MCNC: 4.2 MMOL/L — SIGNIFICANT CHANGE UP (ref 3.5–5)
POTASSIUM SERPL-SCNC: 4.2 MMOL/L — SIGNIFICANT CHANGE UP (ref 3.5–5)
PROT SERPL-MCNC: 6.4 G/DL — SIGNIFICANT CHANGE UP (ref 6–8)
PROT UR-MCNC: NEGATIVE MG/DL — SIGNIFICANT CHANGE UP
SODIUM SERPL-SCNC: 130 MMOL/L — LOW (ref 135–146)
SP GR SPEC: <=1.005 — SIGNIFICANT CHANGE UP (ref 1.01–1.03)
UROBILINOGEN FLD QL: 0.2 MG/DL — SIGNIFICANT CHANGE UP (ref 0.2–0.2)

## 2018-11-06 PROCEDURE — 99203 OFFICE O/P NEW LOW 30 MIN: CPT

## 2018-11-06 RX ORDER — ATORVASTATIN CALCIUM 10 MG/1
10 TABLET, FILM COATED ORAL
Refills: 0 | Status: ACTIVE | COMMUNITY

## 2018-11-06 RX ORDER — ENALAPRIL MALEATE 10 MG/1
10 TABLET ORAL
Refills: 0 | Status: ACTIVE | COMMUNITY

## 2018-11-06 NOTE — ED ADULT NURSE NOTE - NSIMPLEMENTINTERV_GEN_ALL_ED
Implemented All Fall with Harm Risk Interventions:  [place] to call system. Call bell, personal items and telephone within reach. Instruct patient to call for assistance. Room bathroom lighting operational. Non-slip footwear when patient is off stretcher. Physically safe environment: no spills, clutter or unnecessary equipment. Stretcher in lowest position, wheels locked, appropriate side rails in place. Provide visual cue, wrist band, yellow gown, etc. Monitor gait and stability. Monitor for mental status changes and reorient to person, place, and time. Review medications for side effects contributing to fall risk. Reinforce activity limits and safety measures with patient and family. Provide visual clues: red socks.

## 2018-11-06 NOTE — ED PROVIDER NOTE - CARE PROVIDER_API CALL
Vitor Villeda), Urology  [facility] [place] [address]  Phone: [phone]  Fax: [phone]

## 2018-11-06 NOTE — ED PROVIDER NOTE - PROGRESS NOTE DETAILS
patient attempted to urinate on her own. States she got quite a bit out but still feels like she has urine. Did a bedside screening sono and bladder volume was 453 cc. Will therefore place a catheter. Will also order official US of retroperitoneal area. Awaiting labs.

## 2018-11-06 NOTE — ED PROVIDER NOTE - ATTENDING CONTRIBUTION TO CARE
89 yo female with PMH of HTN, high chol, OP, hysterectomy, urinary retention, and recent Left hip partial replacement (oct 2) presents to ER for urinary retention since about 2:30 pm. Pt had curran removed by the Urologist today in office (Dr Mckeon). Was able to urinate some but states she still had 200+cc of urine in bladder as residual. The urologist wanted her to attempt to urinate on own instead of replacing the catheter and told her to come to ER if she was having trouble urinating. States she could only produce drops of urine, and tried to urinate in ER as she could feel some leakage (? overflow incontinence). States she put out a lot but on bedside sono still had 453 cc residual after urinating. Pt has denies abdomen or back pain, no N/V/C/F. No SOB, no CP. Exam nontender abdomen, +BS, no masses, remainder as per PA note. Will check labs (renal function and UA/UCX) and check official US retroperitoneal (look for obstructive pathology, hydro, etc), place new curran. To reassess.

## 2018-11-06 NOTE — ED PROVIDER NOTE - NS ED ROS FT
Review of Systems    Constitutional: (-) fever (-) weakness (-) diaphoresis   Eyes: (-) change in vision (-) eye pain  ENT: (-) sore throat (-) ear ache (-) nasal discharge  Cardiovascular: (-) chest pain  (-) palpitations  Respiratory: (-) SOB (-) cough   GI: (-) abdominal pain (-) N/V (-) diarrhea  Integumentary: (-) rash (-) redness   : SEE HPI

## 2018-11-06 NOTE — ED PROVIDER NOTE - PHYSICAL EXAMINATION
Physical Exam    Vital Signs: I have reviewed the initial vital signs.  Constitutional: well-nourished, appears stated age, no acute distress  Eyes: PERRLA, EOM intact, RAPD absent, conjunctiva clear and symmetrical lids.  ENT: neck supple with no adenopathy, moist MM.  Cardiovascular: +S1/S2, no murmurs, regular rate, regular rhythm, well-perfused extremities  Respiratory: unlabored respiratory effort, clear to auscultation bilaterally, speaks in full sentences  Gastrointestinal: soft, non-tender abdomen, no pulsatile mass.

## 2018-11-06 NOTE — ED ADULT NURSE NOTE - PSH
H/O lumpectomy  2015, right, pt states benign pathology  History of hysterectomy    S/P hip replacement, left  partial

## 2018-11-06 NOTE — ED PROVIDER NOTE - MEDICAL DECISION MAKING DETAILS
elderly female with urinary retention, curran placed and urine with infection. Covered with ABX. Follow up with Urology for future removal. Leg bag given in ER.

## 2018-11-06 NOTE — ED ADULT NURSE NOTE - OBJECTIVE STATEMENT
Pt states she has curran removed today at 2pm by urologist and has urinated " very little" since then x 4. Denies burning/pain with urination. C/O left hip pain s/p partial left hip replacement oct 2.

## 2018-11-06 NOTE — ED PROVIDER NOTE - OBJECTIVE STATEMENT
89 yo f w pmhx of urinary retention s/p l hip sgx reports with decreased urin output since 1430 in the PM pt last voided in the urologist office with residual of 200 cc was sent home w/o indwelling catheter. Attempted to void in the ed with minimal urine. reports accident with overflow. Denies dysuria, urgency, frequency, abd pain, fevers.    I have reviewed available current nursing and previous documentation of past medical, surgical, family, and/or social history.

## 2018-11-07 VITALS — HEART RATE: 101 BPM

## 2018-11-07 LAB
BACTERIA # UR AUTO: ABNORMAL /HPF
EPI CELLS # UR: ABNORMAL /HPF
RBC CASTS # UR COMP ASSIST: ABNORMAL /HPF
WBC UR QL: >50 /HPF

## 2018-11-07 RX ORDER — CEFTRIAXONE 500 MG/1
1 INJECTION, POWDER, FOR SOLUTION INTRAMUSCULAR; INTRAVENOUS ONCE
Qty: 0 | Refills: 0 | Status: COMPLETED | OUTPATIENT
Start: 2018-11-07 | End: 2018-11-07

## 2018-11-07 RX ORDER — SODIUM CHLORIDE 9 MG/ML
500 INJECTION INTRAMUSCULAR; INTRAVENOUS; SUBCUTANEOUS ONCE
Qty: 0 | Refills: 0 | Status: COMPLETED | OUTPATIENT
Start: 2018-11-07 | End: 2018-11-07

## 2018-11-07 RX ORDER — ACETAMINOPHEN 500 MG
650 TABLET ORAL ONCE
Qty: 0 | Refills: 0 | Status: COMPLETED | OUTPATIENT
Start: 2018-11-07 | End: 2018-11-07

## 2018-11-07 RX ORDER — CEFPODOXIME PROXETIL 100 MG
1 TABLET ORAL
Qty: 20 | Refills: 0 | OUTPATIENT
Start: 2018-11-07 | End: 2018-11-16

## 2018-11-07 RX ADMIN — Medication 650 MILLIGRAM(S): at 02:02

## 2018-11-07 RX ADMIN — SODIUM CHLORIDE 166.67 MILLILITER(S): 9 INJECTION INTRAMUSCULAR; INTRAVENOUS; SUBCUTANEOUS at 00:30

## 2018-11-07 RX ADMIN — Medication 650 MILLIGRAM(S): at 02:10

## 2018-11-07 RX ADMIN — CEFTRIAXONE 100 GRAM(S): 500 INJECTION, POWDER, FOR SOLUTION INTRAMUSCULAR; INTRAVENOUS at 00:30

## 2018-11-09 ENCOUNTER — APPOINTMENT (OUTPATIENT)
Dept: UROLOGY | Facility: CLINIC | Age: 83
End: 2018-11-09
Payer: MEDICARE

## 2018-11-09 VITALS
DIASTOLIC BLOOD PRESSURE: 78 MMHG | HEIGHT: 60 IN | HEART RATE: 93 BPM | SYSTOLIC BLOOD PRESSURE: 153 MMHG | WEIGHT: 136 LBS | BODY MASS INDEX: 26.7 KG/M2

## 2018-11-09 PROCEDURE — 99213 OFFICE O/P EST LOW 20 MIN: CPT

## 2018-11-14 ENCOUNTER — TRANSCRIPTION ENCOUNTER (OUTPATIENT)
Age: 83
End: 2018-11-14

## 2018-11-16 ENCOUNTER — APPOINTMENT (OUTPATIENT)
Dept: UROLOGY | Facility: CLINIC | Age: 83
End: 2018-11-16

## 2018-11-16 VITALS
SYSTOLIC BLOOD PRESSURE: 154 MMHG | WEIGHT: 136 LBS | DIASTOLIC BLOOD PRESSURE: 74 MMHG | HEART RATE: 110 BPM | BODY MASS INDEX: 26.7 KG/M2 | HEIGHT: 60 IN

## 2018-11-20 ENCOUNTER — EMERGENCY (EMERGENCY)
Facility: HOSPITAL | Age: 83
LOS: 0 days | Discharge: HOME | End: 2018-11-20
Attending: EMERGENCY MEDICINE | Admitting: EMERGENCY MEDICINE

## 2018-11-20 VITALS
RESPIRATION RATE: 20 BRPM | DIASTOLIC BLOOD PRESSURE: 69 MMHG | OXYGEN SATURATION: 98 % | TEMPERATURE: 97 F | SYSTOLIC BLOOD PRESSURE: 129 MMHG | HEART RATE: 120 BPM | WEIGHT: 136.03 LBS

## 2018-11-20 VITALS
HEART RATE: 106 BPM | OXYGEN SATURATION: 98 % | RESPIRATION RATE: 19 BRPM | DIASTOLIC BLOOD PRESSURE: 68 MMHG | SYSTOLIC BLOOD PRESSURE: 143 MMHG | TEMPERATURE: 98 F
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
## 2018-11-20 DIAGNOSIS — Z98.890 OTHER SPECIFIED POSTPROCEDURAL STATES: Chronic | ICD-10-CM
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
## 2018-11-20 DIAGNOSIS — Z79.82 LONG TERM (CURRENT) USE OF ASPIRIN: ICD-10-CM

## 2018-11-20 DIAGNOSIS — Z96.642 PRESENCE OF LEFT ARTIFICIAL HIP JOINT: ICD-10-CM

## 2018-11-20 DIAGNOSIS — T83.091A OTHER MECHANICAL COMPLICATION OF INDWELLING URETHRAL CATHETER, INITIAL ENCOUNTER: ICD-10-CM

## 2018-11-20 DIAGNOSIS — Z98.890 OTHER SPECIFIED POSTPROCEDURAL STATES: ICD-10-CM

## 2018-11-20 DIAGNOSIS — Z90.710 ACQUIRED ABSENCE OF BOTH CERVIX AND UTERUS: Chronic | ICD-10-CM
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
## 2018-11-20 DIAGNOSIS — I10 ESSENTIAL (PRIMARY) HYPERTENSION: ICD-10-CM

## 2018-11-20 DIAGNOSIS — Z90.710 ACQUIRED ABSENCE OF BOTH CERVIX AND UTERUS: ICD-10-CM

## 2018-11-20 DIAGNOSIS — Z96.642 PRESENCE OF LEFT ARTIFICIAL HIP JOINT: Chronic | ICD-10-CM

## 2018-11-20 DIAGNOSIS — R33.9 RETENTION OF URINE, UNSPECIFIED: ICD-10-CM
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
## 2018-11-20 DIAGNOSIS — E78.5 HYPERLIPIDEMIA, UNSPECIFIED: ICD-10-CM

## 2018-11-20 LAB
ANION GAP SERPL CALC-SCNC: 19 MMOL/L — HIGH (ref 7–14)
APPEARANCE UR: ABNORMAL
BACTERIA # UR AUTO: ABNORMAL /HPF
BILIRUB UR-MCNC: NEGATIVE — SIGNIFICANT CHANGE UP
BUN SERPL-MCNC: 23 MG/DL — HIGH (ref 10–20)
CALCIUM SERPL-MCNC: 9.9 MG/DL — SIGNIFICANT CHANGE UP (ref 8.5–10.1)
CHLORIDE SERPL-SCNC: 100 MMOL/L — SIGNIFICANT CHANGE UP (ref 98–110)
CO2 SERPL-SCNC: 19 MMOL/L — SIGNIFICANT CHANGE UP (ref 17–32)
COLOR SPEC: YELLOW — SIGNIFICANT CHANGE UP
CREAT SERPL-MCNC: 0.7 MG/DL — SIGNIFICANT CHANGE UP (ref 0.7–1.5)
DIFF PNL FLD: NEGATIVE — SIGNIFICANT CHANGE UP
EPI CELLS # UR: ABNORMAL /HPF
GLUCOSE SERPL-MCNC: 123 MG/DL — HIGH (ref 70–99)
GLUCOSE UR QL: NEGATIVE MG/DL — SIGNIFICANT CHANGE UP
KETONES UR-MCNC: NEGATIVE — SIGNIFICANT CHANGE UP
LEUKOCYTE ESTERASE UR-ACNC: ABNORMAL
NITRITE UR-MCNC: NEGATIVE — SIGNIFICANT CHANGE UP
PH UR: 6 — SIGNIFICANT CHANGE UP (ref 5–8)
POTASSIUM SERPL-MCNC: 4.1 MMOL/L — SIGNIFICANT CHANGE UP (ref 3.5–5)
POTASSIUM SERPL-SCNC: 4.1 MMOL/L — SIGNIFICANT CHANGE UP (ref 3.5–5)
PROT UR-MCNC: ABNORMAL MG/DL
SODIUM SERPL-SCNC: 138 MMOL/L — SIGNIFICANT CHANGE UP (ref 135–146)
SP GR SPEC: 1.01 — SIGNIFICANT CHANGE UP (ref 1.01–1.03)
UROBILINOGEN FLD QL: 0.2 MG/DL — SIGNIFICANT CHANGE UP (ref 0.2–0.2)
WBC UR QL: SIGNIFICANT CHANGE UP /HPF

## 2018-11-20 RX ORDER — SODIUM CHLORIDE 9 MG/ML
3 INJECTION INTRAMUSCULAR; INTRAVENOUS; SUBCUTANEOUS EVERY 8 HOURS
Qty: 0 | Refills: 0 | Status: DISCONTINUED | OUTPATIENT
Start: 2018-11-20 | End: 2018-11-20

## 2018-11-20 RX ORDER — SODIUM CHLORIDE 9 MG/ML
500 INJECTION INTRAMUSCULAR; INTRAVENOUS; SUBCUTANEOUS ONCE
Qty: 0 | Refills: 0 | Status: COMPLETED | OUTPATIENT
Start: 2018-11-20 | End: 2018-11-20

## 2018-11-20 RX ADMIN — SODIUM CHLORIDE 166.67 MILLILITER(S): 9 INJECTION INTRAMUSCULAR; INTRAVENOUS; SUBCUTANEOUS at 04:00

## 2018-11-20 RX ADMIN — SODIUM CHLORIDE 3 MILLILITER(S): 9 INJECTION INTRAMUSCULAR; INTRAVENOUS; SUBCUTANEOUS at 05:17

## 2018-11-20 NOTE — ED PROVIDER NOTE - PHYSICAL EXAMINATION
VITAL SIGNS: I have reviewed nursing notes and confirm.  CONSTITUTIONAL: Well-developed; well-nourished; in no acute distress.  SKIN: Skin exam is warm and dry, no acute rash.  HEAD: Normocephalic; atraumatic.  EYES: PERRL, EOM intact; conjunctiva and sclera clear.  ENT: No nasal discharge; airway clear. TMs clear.  NECK: Supple; non tender.  CARD: S1, S2 normal; no murmurs, gallops, or rubs. Regular rate and rhythm.  RESP: No wheezes, rales or rhonchi.  ABD: Normal bowel sounds; soft; non-distended; +suprapubic tenderness r; no hepatosplenomegaly. Gaviria in place with leg bag attached  EXT: Normal ROM. No clubbing, cyanosis or edema.  LYMPH: No acute cervical adenopathy.  NEURO: Alert, oriented. Grossly unremarkable. No focal deficits.  PSYCH: Cooperative, appropriate.

## 2018-11-20 NOTE — ED PROVIDER NOTE - PROGRESS NOTE DETAILS
Pt labs done. Will hold off on abx as pt just got a curran catheter change. UCX sent (call back team will call in prescription of abx if needed)

## 2018-11-20 NOTE — ED PROVIDER NOTE - MEDICAL DECISION MAKING DETAILS
urine catheter malfunctioned, new one placed for  urine retention with 1000 cc of urine output; Fluids replaced  via IV. Pt set up with Urology appt next tuesday. Return to ER  any worsening of symptoms.

## 2018-11-20 NOTE — ED ADULT NURSE NOTE - NSIMPLEMENTINTERV_GEN_ALL_ED
Implemented All Fall with Harm Risk Interventions:  [place] to call system. Call bell, personal items and telephone within reach. Instruct patient to call for assistance. Room bathroom lighting operational. Non-slip footwear when patient is off stretcher. Physically safe environment: no spills, clutter or unnecessary equipment. Stretcher in lowest position, wheels locked, appropriate side rails in place. Provide visual cue, wrist band, yellow gown, etc. Monitor gait and stability. Monitor for mental status changes and reorient to person, place, and time. Review medications for side effects contributing to fall risk. Reinforce activity limits and safety measures with patient and family. Provide visual clues: red socks.

## 2018-11-20 NOTE — ED PROVIDER NOTE - NSFOLLOWUPINSTRUCTIONS_ED_ALL_ED_FT
Urinary Retention    Urinary retention is the inability to completely empty your bladder. This is a common problem in older men, especially with enlarged prostates. If you are sent home with a curran catheter and a drainage system make sure to keep the drainage bag emptied and lower than your catheter. Keep the curran catheter in until you follow up with a urologist.    SEEK IMMEDIATE MEDICAL CARE IF YOU DEVELOP THE FOLLOWING SYMPTOMS: the catheter stops draining urine, the catheter falls out, abdominal pain, nausea/vomiting, or chills/fever.

## 2018-11-20 NOTE — ED ADULT TRIAGE NOTE - CHIEF COMPLAINT QUOTE
Pt came c/o of Gaviria catheter not draining since last night, pt stated Gaviria was replaced on November 7 for chronic retention.

## 2018-11-20 NOTE — ED PROVIDER NOTE - OBJECTIVE STATEMENT
89 yo female with PMH of urinary retention, HTN, HLD, breast cancer, hip fx, presents to ER for problem with her urine catheter. Was in retention last time in ER a couple of weeks ago. Gaviria placed and now she states she's urinating around it, and doesn't feel it's draining well as she is again having that feeling of fullness of the suprapubic area. Pt denies fever, chills, sob, cp, back pain, or diarrhea.     PMH as above  ALL: nkda  Meds see list  SH: denies  Urology Dr Mckeon

## 2018-11-20 NOTE — ED ADULT NURSE NOTE - OBJECTIVE STATEMENT
pt C/O urinary rentention. States curran was place in ED x 2weeks. C/O feeling of having to urinate, curran noted to be kinked on arrival to ED.

## 2018-11-25 RX ORDER — NITROFURANTOIN MACROCRYSTAL 50 MG
1 CAPSULE ORAL
Qty: 14 | Refills: 0 | OUTPATIENT
Start: 2018-11-25 | End: 2018-12-01

## 2018-11-27 ENCOUNTER — APPOINTMENT (OUTPATIENT)
Dept: UROLOGY | Facility: CLINIC | Age: 83
End: 2018-11-27
Payer: MEDICARE

## 2018-11-27 VITALS
WEIGHT: 136 LBS | HEIGHT: 60 IN | HEART RATE: 99 BPM | BODY MASS INDEX: 26.7 KG/M2 | SYSTOLIC BLOOD PRESSURE: 171 MMHG | DIASTOLIC BLOOD PRESSURE: 90 MMHG

## 2018-11-27 PROCEDURE — 51798 US URINE CAPACITY MEASURE: CPT

## 2018-11-27 PROCEDURE — 51741 ELECTRO-UROFLOWMETRY FIRST: CPT

## 2018-11-27 PROCEDURE — 51797 INTRAABDOMINAL PRESSURE TEST: CPT

## 2018-11-27 PROCEDURE — 51784 ANAL/URINARY MUSCLE STUDY: CPT

## 2018-11-27 PROCEDURE — 51728 CYSTOMETROGRAM W/VP: CPT

## 2018-12-21 NOTE — ED PROVIDER NOTE - CROS ED ROS STATEMENT
all other ROS negative except as per HPI W Plasty Text: The lesion was extirpated to the level of the fat with a #15 scalpel blade.  Given the location of the defect, shape of the defect and the proximity to free margins a W-plasty was deemed most appropriate for repair.  Using a sterile surgical marker, the appropriate transposition arms of the W-plasty were drawn incorporating the defect and placing the expected incisions within the relaxed skin tension lines where possible.    The area thus outlined was incised deep to adipose tissue with a #15 scalpel blade.  The skin margins were undermined to an appropriate distance in all directions utilizing iris scissors.  The opposing transposition arms were then transposed into place in opposite direction and anchored with interrupted buried subcutaneous sutures.

## 2019-04-08 ENCOUNTER — APPOINTMENT (OUTPATIENT)
Dept: UROLOGY | Facility: CLINIC | Age: 84
End: 2019-04-08
Payer: MEDICARE

## 2019-04-08 VITALS
DIASTOLIC BLOOD PRESSURE: 86 MMHG | BODY MASS INDEX: 26.7 KG/M2 | WEIGHT: 136 LBS | HEART RATE: 83 BPM | HEIGHT: 60 IN | SYSTOLIC BLOOD PRESSURE: 192 MMHG

## 2019-04-08 PROCEDURE — 99214 OFFICE O/P EST MOD 30 MIN: CPT

## 2019-04-08 NOTE — PHYSICAL EXAM

## 2019-04-08 NOTE — ASSESSMENT
[FreeTextEntry1] : 89 F History of urinary retention status post hip surgery October 2018 And urodynamics detrusor underactivity. \par Bladder scan post void residual today shows 150-189 mL which is stable and reasonable value as long as the patient is not develop urinary tract infections or worsening urinary retention. She was present Flomax which she self DC'd and this is fine for now.\par Advised the patient to avoid anticholinergic medicines such as cold and allergy medicines.\par Avoid bladder irritants which may cause worsening incontinence.\par Most importantly continue timed voiding and followup in one year or earlier if worsening symptoms.

## 2019-04-08 NOTE — HISTORY OF PRESENT ILLNESS
[FreeTextEntry1] : 89 F History of urinary retention status post hip surgery October 2018 presents for Fu. We performed urodynamics which demonstrated detrusor underactivity. Patient has since been voiding time voiding schedule. She denies any lower urinary tract symptoms other than occasional incontinence. Denies any dysuria or hematuria. Has not had any urinary tract infections. No pelvic pain.

## 2019-05-13 ENCOUNTER — OUTPATIENT (OUTPATIENT)
Dept: OUTPATIENT SERVICES | Facility: HOSPITAL | Age: 84
LOS: 1 days | Discharge: HOME | End: 2019-05-13
Payer: MEDICARE

## 2019-05-13 DIAGNOSIS — Z98.890 OTHER SPECIFIED POSTPROCEDURAL STATES: Chronic | ICD-10-CM

## 2019-05-13 DIAGNOSIS — Z90.710 ACQUIRED ABSENCE OF BOTH CERVIX AND UTERUS: Chronic | ICD-10-CM

## 2019-05-13 DIAGNOSIS — Z12.31 ENCOUNTER FOR SCREENING MAMMOGRAM FOR MALIGNANT NEOPLASM OF BREAST: ICD-10-CM

## 2019-05-13 DIAGNOSIS — Z96.642 PRESENCE OF LEFT ARTIFICIAL HIP JOINT: Chronic | ICD-10-CM

## 2019-05-13 PROCEDURE — 77063 BREAST TOMOSYNTHESIS BI: CPT | Mod: 26

## 2019-05-13 PROCEDURE — 77067 SCR MAMMO BI INCL CAD: CPT | Mod: 26

## 2019-10-23 ENCOUNTER — OUTPATIENT (OUTPATIENT)
Dept: OUTPATIENT SERVICES | Facility: HOSPITAL | Age: 84
LOS: 1 days | Discharge: HOME | End: 2019-10-23

## 2019-10-23 DIAGNOSIS — Z96.642 PRESENCE OF LEFT ARTIFICIAL HIP JOINT: Chronic | ICD-10-CM

## 2019-10-23 DIAGNOSIS — Z90.710 ACQUIRED ABSENCE OF BOTH CERVIX AND UTERUS: Chronic | ICD-10-CM

## 2019-10-23 DIAGNOSIS — Z98.890 OTHER SPECIFIED POSTPROCEDURAL STATES: Chronic | ICD-10-CM

## 2019-10-24 DIAGNOSIS — Z13.820 ENCOUNTER FOR SCREENING FOR OSTEOPOROSIS: ICD-10-CM

## 2019-10-24 DIAGNOSIS — M81.0 AGE-RELATED OSTEOPOROSIS WITHOUT CURRENT PATHOLOGICAL FRACTURE: ICD-10-CM

## 2019-10-24 DIAGNOSIS — Z87.310 PERSONAL HISTORY OF (HEALED) OSTEOPOROSIS FRACTURE: ICD-10-CM

## 2019-10-24 DIAGNOSIS — Z78.0 ASYMPTOMATIC MENOPAUSAL STATE: ICD-10-CM

## 2020-01-30 ENCOUNTER — TRANSCRIPTION ENCOUNTER (OUTPATIENT)
Age: 85
End: 2020-01-30

## 2020-02-03 ENCOUNTER — TRANSCRIPTION ENCOUNTER (OUTPATIENT)
Age: 85
End: 2020-02-03

## 2020-02-10 ENCOUNTER — APPOINTMENT (OUTPATIENT)
Dept: UROLOGY | Facility: CLINIC | Age: 85
End: 2020-02-10
Payer: MEDICARE

## 2020-02-10 PROCEDURE — 99214 OFFICE O/P EST MOD 30 MIN: CPT

## 2020-02-10 NOTE — PHYSICAL EXAM
[General Appearance - Well Developed] : well developed [General Appearance - Well Nourished] : well nourished [Well Groomed] : well groomed [Normal Appearance] : normal appearance [General Appearance - In No Acute Distress] : no acute distress [Costovertebral Angle Tenderness] : no ~M costovertebral angle tenderness [Abdomen Soft] : soft [Abdomen Tenderness] : non-tender [Edema] : no peripheral edema [] : no respiratory distress [Respiration, Rhythm And Depth] : normal respiratory rhythm and effort [Exaggerated Use Of Accessory Muscles For Inspiration] : no accessory muscle use [Oriented To Time, Place, And Person] : oriented to person, place, and time [Affect] : the affect was normal [Mood] : the mood was normal [Not Anxious] : not anxious [Normal Station and Gait] : the gait and station were normal for the patient's age [No Palpable Adenopathy] : no palpable adenopathy [FreeTextEntry1] : no sp tenderness

## 2020-02-10 NOTE — ASSESSMENT
[FreeTextEntry1] : 89 F History of urinary retention status post hip surgery October 2018 And urodynamics detrusor underactivity. \par PVR stable.\par However reports new vaginal bulging. recommend jenny w dr chase\par cont timed voiding

## 2020-02-10 NOTE — HISTORY OF PRESENT ILLNESS
[FreeTextEntry1] : 89 F History of urinary retention status post hip surgery October 2018 And urodynamics detrusor underactivity. \par Reports she has been doing well. Notices a new bulging from vagina.\par Does have nocturia not particularly bothersome.\par bladder scan PVR today 170 cc\par \par Prev We performed urodynamics which demonstrated detrusor underactivity. Patient has since been voiding time voiding schedule.Denies any dysuria or hematuria. Has not had any urinary tract infections. No pelvic pain.

## 2020-03-04 ENCOUNTER — OUTPATIENT (OUTPATIENT)
Dept: OUTPATIENT SERVICES | Facility: HOSPITAL | Age: 85
LOS: 1 days | Discharge: HOME | End: 2020-03-04
Payer: MEDICARE

## 2020-03-04 DIAGNOSIS — Z90.710 ACQUIRED ABSENCE OF BOTH CERVIX AND UTERUS: Chronic | ICD-10-CM

## 2020-03-04 DIAGNOSIS — Z98.890 OTHER SPECIFIED POSTPROCEDURAL STATES: Chronic | ICD-10-CM

## 2020-03-04 DIAGNOSIS — N81.10 CYSTOCELE, UNSPECIFIED: ICD-10-CM

## 2020-03-04 DIAGNOSIS — Z96.642 PRESENCE OF LEFT ARTIFICIAL HIP JOINT: Chronic | ICD-10-CM

## 2020-03-04 PROCEDURE — 72192 CT PELVIS W/O DYE: CPT | Mod: 26

## 2020-06-22 ENCOUNTER — APPOINTMENT (OUTPATIENT)
Dept: UROGYNECOLOGY | Facility: CLINIC | Age: 85
End: 2020-06-22
Payer: MEDICARE

## 2020-06-22 VITALS
SYSTOLIC BLOOD PRESSURE: 154 MMHG | WEIGHT: 136 LBS | BODY MASS INDEX: 28.55 KG/M2 | HEART RATE: 80 BPM | HEIGHT: 58 IN | DIASTOLIC BLOOD PRESSURE: 82 MMHG

## 2020-06-22 DIAGNOSIS — Z87.898 PERSONAL HISTORY OF OTHER SPECIFIED CONDITIONS: ICD-10-CM

## 2020-06-22 DIAGNOSIS — E78.00 PURE HYPERCHOLESTEROLEMIA, UNSPECIFIED: ICD-10-CM

## 2020-06-22 DIAGNOSIS — Z86.39 PERSONAL HISTORY OF OTHER ENDOCRINE, NUTRITIONAL AND METABOLIC DISEASE: ICD-10-CM

## 2020-06-22 DIAGNOSIS — M81.0 AGE-RELATED OSTEOPOROSIS W/OUT CURRENT PATHOLOGICAL FRACTURE: ICD-10-CM

## 2020-06-22 LAB
BILIRUB UR QL STRIP: NEGATIVE
CLARITY UR: CLEAR
COLLECTION METHOD: NORMAL
GLUCOSE UR-MCNC: NEGATIVE
HCG UR QL: NORMAL EU/DL
HGB UR QL STRIP.AUTO: NEGATIVE
KETONES UR-MCNC: NEGATIVE
LEUKOCYTE ESTERASE UR QL STRIP: NEGATIVE
NITRITE UR QL STRIP: NEGATIVE
PH UR STRIP: 6
PROT UR STRIP-MCNC: NEGATIVE
SP GR UR STRIP: 1

## 2020-06-22 PROCEDURE — 51701 INSERT BLADDER CATHETER: CPT

## 2020-06-22 PROCEDURE — 81003 URINALYSIS AUTO W/O SCOPE: CPT | Mod: QW

## 2020-06-22 PROCEDURE — 99204 OFFICE O/P NEW MOD 45 MIN: CPT

## 2020-06-22 RX ORDER — TAMSULOSIN HYDROCHLORIDE 0.4 MG/1
0.4 CAPSULE ORAL
Qty: 30 | Refills: 6 | Status: DISCONTINUED | COMMUNITY
End: 2020-06-22

## 2020-06-22 NOTE — PHYSICAL EXAM
[Chaperone Present] : A chaperone was present in the examining room during all aspects of the physical examination [FreeTextEntry1] : Void: 325 cc\par PVR: 100 cc\par Urethra was prepped in sterile fashion and then a sterile catheter was used by me to drain the bladder.\par \par GH: 5    pB: 3   TVL: 7  C: -3.5   D: N/A   Aa: -2  Ba: -2  Ap: +3  Bp: +4.5\par  \par Well healed incision: Pfannenstiel\par normal perineal sensation\par absent perineal reflexes\par negative cough stress test with and without reduction\par positive atrophy\par positive urethral caruncle\par positive urethral hypermobility\par bilateral levator ani spasm,  no tenderness\par no urethral tenderness\par no bladder tenderness\par no cuff tenderness\par surgically absent uterus and cervix\par 1/5 Kegel\par

## 2020-06-22 NOTE — HISTORY OF PRESENT ILLNESS
[FreeTextEntry1] : \par Pt with pelvic floor dysfunction here for urogynecologic evaluation. She describes: \par Referring provider: Dr Wooten\par PCP: Dr Ariza\par \par Chief PFD: prolapse\par \par Pelvic organ prolapse: s/p SAGRARIO/BS0/appy (fibroids), +bulge, for 6 months, not worsening, denies splinting \par Stress urinary incontinence: denies\par Overactive bladder syndrome: not bothersome, urgency, frequency, nocturia, urge incontinence a couple of times per month, no glaucoma\par Voiding dysfunction: yes Incomplete bladder emptying, yes hesitancy \par Lower urinary tract/vaginal symptoms: no recurrent UTIs per year, no hematuria, no dysuria, no bladder pain \par Fecal incontinence: denies\par Defecatory dysfunction: sausage\par Sexual dysfunction: not active\par Pelvic pain: denies\par Vaginal dryness: denies\par \par Her pelvic floor symptoms are significantly bothersome and negatively impacting her quality of life. \par \par

## 2020-06-22 NOTE — COUNSELING
[FreeTextEntry1] : \par We will notify you of the urine results if there is an infection\par \par Please increase your water intake to at least 4-5 cups of water per day\par \par For 4-5 days, cut one item from the list out of your diet.\par \par After 4-5 days, it it makes a difference, you have to decide if it is worth keeping it out of your diet to help with the urinary issues.\par \par If it does not make a difference, you should add it back into your diet and remove another item for another 4-5 days.\par \par Avoid liquid intake 2 hours before bedtime. It is recommended that you take sips of water to take your medications before bedtime. \par \par Elevate your legs throughout the day and 2 hours before bedtime. \par \par We recommend wearing compression stockings during the day to help decrease the night time urination.\par \par Please call the office if you decide you would like to try a pessary (device) for the vaginal bulge or start a medication for the urinary urgency\par \par Follow up as needed

## 2020-06-22 NOTE — DISCUSSION/SUMMARY
[FreeTextEntry1] : \par Rectocele-\par The patient was counseled regarding the possible natural progression of prolapse and the clinical consequences of worsening prolapse. The stage and the location of the prolapse was reviewed with the patient. She was counseled regarding the management strategies including observation, pessary placement and surgery. The patient voiced understanding and agrees with observation.\par \par Nocturia-\par The pathophysiology of the above condition was discussed with the patient. The patient was given information and education on pelvic floor muscle exercises/rehabilitation, avoidance of dietary bladder irritants, and other strategies to improve bladder control, such as scheduled voiding. She was counseled regarding further management strategies for overactive bladder and urge incontinence including pelvic floor physical therapy, medications or surgical management. The patient voiced understanding and agrees with diet changes and behavioral modification for nocturia. We will follow up on her urine tests.\par \par \par \par

## 2020-06-23 LAB
APPEARANCE: CLEAR
BILIRUBIN URINE: NEGATIVE
BLOOD URINE: NEGATIVE
COLOR: NORMAL
GLUCOSE QUALITATIVE U: NEGATIVE
KETONES URINE: NEGATIVE
LEUKOCYTE ESTERASE URINE: NEGATIVE
NITRITE URINE: NEGATIVE
PH URINE: 6
PROTEIN URINE: NEGATIVE
SPECIFIC GRAVITY URINE: 1.01
UROBILINOGEN URINE: NORMAL

## 2020-06-24 LAB — BACTERIA UR CULT: NORMAL

## 2020-08-13 ENCOUNTER — APPOINTMENT (OUTPATIENT)
Dept: UROGYNECOLOGY | Facility: CLINIC | Age: 85
End: 2020-08-13
Payer: MEDICARE

## 2020-08-13 VITALS
DIASTOLIC BLOOD PRESSURE: 75 MMHG | WEIGHT: 135 LBS | HEIGHT: 58 IN | SYSTOLIC BLOOD PRESSURE: 145 MMHG | HEART RATE: 81 BPM | BODY MASS INDEX: 28.34 KG/M2

## 2020-08-13 DIAGNOSIS — Z85.820 PERSONAL HISTORY OF MALIGNANT MELANOMA OF SKIN: ICD-10-CM

## 2020-08-13 PROCEDURE — 57160 INSERT PESSARY/OTHER DEVICE: CPT

## 2020-08-13 NOTE — COUNSELING
[FreeTextEntry1] : Michelle or Jer will contact you when the pessary arrive in the office. She will then schedule an appointment for pessary placement. (Ring and support #4)\par \par Apply a pea size amount of the cream to the opening of the vagina every night for two weeks followed by three nights per week. (Monday, Wednesday, and Friday night)\par \par Please call my office if you have any issues with the cost or side effects of the medication.\par \par Please call the office with any questions, issues, or concerns.\par

## 2020-08-13 NOTE — PHYSICAL EXAM
[Chaperone Present] : A chaperone was present in the examining room during all aspects of the physical examination [Well developed] : well developed [No Acute Distress] : in no acute distress [Well Nourished] : ~L well nourished

## 2020-08-13 NOTE — DISCUSSION/SUMMARY
[FreeTextEntry1] : \par Rectocele:\par Ring and support #5: pt expressed discomfort. Removed without difficulty.\par Ring and support #4 placed without difficulty. Remained in place with Valsalva, coughing, and ambulating. \par \par The patient tolerated all fittings well.\par Will return for placement when pessary is ordered and arrives to the office.\par \par Atrophic Vaginitis:\par We reviewed the risks, benefits, alternatives and indications of local estrogen therapy and I gave her a handout that covers this information. She does opt to begin this therapy. I have given her a prescription and specific instructions on how to use the estrogen cream, applied with a finger at a low dose for urogenital atrophy.\par

## 2020-08-13 NOTE — HISTORY OF PRESENT ILLNESS
[FreeTextEntry1] : Patient is here for pessary fitting for rectocele. GH: 5 TVL: 7\par Last seen 6/22/2020 as a new pt for prolapse/rectocele.\par \par s/p SAGRARIO/BS0/appy (fibroids)\par no Glaucoma\par \par Denies stress incontinence\par Not sexually active\par No history of incomplete bladder emptying without reduction. \par \par No complaints today.\par

## 2020-10-28 NOTE — H&P ADULT - ATTENDING COMMENTS
89/F hx of HTN, p/w fall from standing after loss of balance. Found to have left fremoral neck fx. Admit to medicine for Left femoral neck fx.    1.  Left femoral neck hip fracture      Ortho eval appreciated:  OR 10/1      pre-op labs, chest xray, ekg, T&S & medical risk assessment     NWB Left       2unit PRBC on hold - please order once T&S is completed      PRN analgesics      2D Echo- cardio eval- low to moderate risk pt fo intermediate risk sx    2.  HTN      C/w atenolol, ACEi, CCB    3. DLD - c/w statin    4.  CKD III  ??     w/Low Bicarb     Give 1L IVF, repeat BMP     Check UA & Ur protein / Cr ratio      No hypotension    5. Lung Nodule     Followed with pulm (dr. Barraza) - was advised no longer needs to follows since nodule was stable over 2 yrs    DVT ppx - heparin  NWB left  DASH diet  No GI ppx    Full Code
Statement Selected

## 2020-11-04 ENCOUNTER — NON-APPOINTMENT (OUTPATIENT)
Age: 85
End: 2020-11-04

## 2020-11-06 ENCOUNTER — NON-APPOINTMENT (OUTPATIENT)
Age: 85
End: 2020-11-06

## 2020-11-06 ENCOUNTER — EMERGENCY (EMERGENCY)
Facility: HOSPITAL | Age: 85
LOS: 0 days | Discharge: HOME | End: 2020-11-06
Attending: STUDENT IN AN ORGANIZED HEALTH CARE EDUCATION/TRAINING PROGRAM | Admitting: STUDENT IN AN ORGANIZED HEALTH CARE EDUCATION/TRAINING PROGRAM
Payer: MEDICARE

## 2020-11-06 VITALS
DIASTOLIC BLOOD PRESSURE: 81 MMHG | HEIGHT: 60 IN | RESPIRATION RATE: 18 BRPM | HEART RATE: 110 BPM | SYSTOLIC BLOOD PRESSURE: 189 MMHG | OXYGEN SATURATION: 96 % | TEMPERATURE: 98 F

## 2020-11-06 VITALS — DIASTOLIC BLOOD PRESSURE: 67 MMHG | OXYGEN SATURATION: 97 % | HEART RATE: 69 BPM | SYSTOLIC BLOOD PRESSURE: 149 MMHG

## 2020-11-06 DIAGNOSIS — Z79.82 LONG TERM (CURRENT) USE OF ASPIRIN: ICD-10-CM

## 2020-11-06 DIAGNOSIS — Z96.642 PRESENCE OF LEFT ARTIFICIAL HIP JOINT: Chronic | ICD-10-CM

## 2020-11-06 DIAGNOSIS — Z90.710 ACQUIRED ABSENCE OF BOTH CERVIX AND UTERUS: Chronic | ICD-10-CM

## 2020-11-06 DIAGNOSIS — Z98.890 OTHER SPECIFIED POSTPROCEDURAL STATES: Chronic | ICD-10-CM

## 2020-11-06 DIAGNOSIS — R33.9 RETENTION OF URINE, UNSPECIFIED: ICD-10-CM

## 2020-11-06 DIAGNOSIS — I10 ESSENTIAL (PRIMARY) HYPERTENSION: ICD-10-CM

## 2020-11-06 DIAGNOSIS — N39.0 URINARY TRACT INFECTION, SITE NOT SPECIFIED: ICD-10-CM

## 2020-11-06 DIAGNOSIS — Z79.899 OTHER LONG TERM (CURRENT) DRUG THERAPY: ICD-10-CM

## 2020-11-06 LAB
ALBUMIN SERPL ELPH-MCNC: 4.1 G/DL — SIGNIFICANT CHANGE UP (ref 3.5–5.2)
ALP SERPL-CCNC: 65 U/L — SIGNIFICANT CHANGE UP (ref 30–115)
ALT FLD-CCNC: 14 U/L — SIGNIFICANT CHANGE UP (ref 0–41)
ANION GAP SERPL CALC-SCNC: 14 MMOL/L — SIGNIFICANT CHANGE UP (ref 7–14)
APPEARANCE UR: ABNORMAL
AST SERPL-CCNC: 19 U/L — SIGNIFICANT CHANGE UP (ref 0–41)
BACTERIA # UR AUTO: NEGATIVE — SIGNIFICANT CHANGE UP
BASOPHILS # BLD AUTO: 0.03 K/UL — SIGNIFICANT CHANGE UP (ref 0–0.2)
BASOPHILS NFR BLD AUTO: 0.3 % — SIGNIFICANT CHANGE UP (ref 0–1)
BILIRUB SERPL-MCNC: 0.4 MG/DL — SIGNIFICANT CHANGE UP (ref 0.2–1.2)
BILIRUB UR-MCNC: NEGATIVE — SIGNIFICANT CHANGE UP
BUN SERPL-MCNC: 20 MG/DL — SIGNIFICANT CHANGE UP (ref 10–20)
CALCIUM SERPL-MCNC: 9.5 MG/DL — SIGNIFICANT CHANGE UP (ref 8.5–10.1)
CHLORIDE SERPL-SCNC: 97 MMOL/L — LOW (ref 98–110)
CO2 SERPL-SCNC: 21 MMOL/L — SIGNIFICANT CHANGE UP (ref 17–32)
COLOR SPEC: YELLOW — SIGNIFICANT CHANGE UP
CREAT SERPL-MCNC: 0.9 MG/DL — SIGNIFICANT CHANGE UP (ref 0.7–1.5)
DIFF PNL FLD: ABNORMAL
EOSINOPHIL # BLD AUTO: 0.01 K/UL — SIGNIFICANT CHANGE UP (ref 0–0.7)
EOSINOPHIL NFR BLD AUTO: 0.1 % — SIGNIFICANT CHANGE UP (ref 0–8)
EPI CELLS # UR: 0 /HPF — SIGNIFICANT CHANGE UP (ref 0–5)
GLUCOSE SERPL-MCNC: 123 MG/DL — HIGH (ref 70–99)
GLUCOSE UR QL: NEGATIVE — SIGNIFICANT CHANGE UP
HCT VFR BLD CALC: 34.8 % — LOW (ref 37–47)
HGB BLD-MCNC: 11.6 G/DL — LOW (ref 12–16)
HYALINE CASTS # UR AUTO: 3 /LPF — SIGNIFICANT CHANGE UP (ref 0–7)
IMM GRANULOCYTES NFR BLD AUTO: 0.5 % — HIGH (ref 0.1–0.3)
KETONES UR-MCNC: NEGATIVE — SIGNIFICANT CHANGE UP
LEUKOCYTE ESTERASE UR-ACNC: ABNORMAL
LYMPHOCYTES # BLD AUTO: 1.36 K/UL — SIGNIFICANT CHANGE UP (ref 1.2–3.4)
LYMPHOCYTES # BLD AUTO: 14.1 % — LOW (ref 20.5–51.1)
MCHC RBC-ENTMCNC: 30.1 PG — SIGNIFICANT CHANGE UP (ref 27–31)
MCHC RBC-ENTMCNC: 33.3 G/DL — SIGNIFICANT CHANGE UP (ref 32–37)
MCV RBC AUTO: 90.2 FL — SIGNIFICANT CHANGE UP (ref 81–99)
MONOCYTES # BLD AUTO: 0.87 K/UL — HIGH (ref 0.1–0.6)
MONOCYTES NFR BLD AUTO: 9 % — SIGNIFICANT CHANGE UP (ref 1.7–9.3)
NEUTROPHILS # BLD AUTO: 7.33 K/UL — HIGH (ref 1.4–6.5)
NEUTROPHILS NFR BLD AUTO: 76 % — HIGH (ref 42.2–75.2)
NITRITE UR-MCNC: NEGATIVE — SIGNIFICANT CHANGE UP
NRBC # BLD: 0 /100 WBCS — SIGNIFICANT CHANGE UP (ref 0–0)
PH UR: 7 — SIGNIFICANT CHANGE UP (ref 5–8)
PLATELET # BLD AUTO: 193 K/UL — SIGNIFICANT CHANGE UP (ref 130–400)
POTASSIUM SERPL-MCNC: 4.3 MMOL/L — SIGNIFICANT CHANGE UP (ref 3.5–5)
POTASSIUM SERPL-SCNC: 4.3 MMOL/L — SIGNIFICANT CHANGE UP (ref 3.5–5)
PROT SERPL-MCNC: 6.4 G/DL — SIGNIFICANT CHANGE UP (ref 6–8)
PROT UR-MCNC: ABNORMAL
RBC # BLD: 3.86 M/UL — LOW (ref 4.2–5.4)
RBC # FLD: 12.9 % — SIGNIFICANT CHANGE UP (ref 11.5–14.5)
RBC CASTS # UR COMP ASSIST: 21 /HPF — HIGH (ref 0–4)
SODIUM SERPL-SCNC: 132 MMOL/L — LOW (ref 135–146)
SP GR SPEC: 1.01 — LOW (ref 1.01–1.03)
UROBILINOGEN FLD QL: SIGNIFICANT CHANGE UP
WBC # BLD: 9.65 K/UL — SIGNIFICANT CHANGE UP (ref 4.8–10.8)
WBC # FLD AUTO: 9.65 K/UL — SIGNIFICANT CHANGE UP (ref 4.8–10.8)
WBC UR QL: >720 /HPF — HIGH (ref 0–5)

## 2020-11-06 PROCEDURE — 99284 EMERGENCY DEPT VISIT MOD MDM: CPT | Mod: GC

## 2020-11-06 RX ORDER — CEFPODOXIME PROXETIL 100 MG
1 TABLET ORAL
Qty: 20 | Refills: 0
Start: 2020-11-06 | End: 2020-11-15

## 2020-11-06 RX ORDER — CEFTRIAXONE 500 MG/1
1000 INJECTION, POWDER, FOR SOLUTION INTRAMUSCULAR; INTRAVENOUS ONCE
Refills: 0 | Status: COMPLETED | OUTPATIENT
Start: 2020-11-06 | End: 2020-11-06

## 2020-11-06 RX ADMIN — CEFTRIAXONE 100 MILLIGRAM(S): 500 INJECTION, POWDER, FOR SOLUTION INTRAMUSCULAR; INTRAVENOUS at 19:41

## 2020-11-06 NOTE — ED PROVIDER NOTE - PROGRESS NOTE DETAILS
curran placed with output of >500cc cloudy urine. Labs pending. Will reassess. Pt has f/u with Dr. Jamison Will discharge patient with curran and follow up with Dr. Jamison. -DC

## 2020-11-06 NOTE — ED PROVIDER NOTE - OBJECTIVE STATEMENT
Patient is a 89 yo F w/ hx of HTN, IBS, uterine prolapse p/w urinary retention. Patient has had difficulty urination, suprapubic pressure, dysuria x 6 days. No fevers, no N/V. Has hx of urinary retention requiring curran catheters in past. Follows with Dr. Jamison.

## 2020-11-06 NOTE — ED PROVIDER NOTE - NSFOLLOWUPINSTRUCTIONS_ED_ALL_ED_FT
Urinary Tract Infection    A urinary tract infection (UTI) is an infection of any part of the urinary tract, which includes the kidneys, ureters, bladder, and urethra. Risk factors include ignoring your need to urinate, wiping back to front if female, being an uncircumcised male, and having diabetes or a weak immune system. Symptoms include frequent urination, pain or burning with urination, foul smelling urine, cloudy urine, pain in the lower abdomen, blood in the urine, and fever. If you were prescribed an antibiotic medicine, take it as told by your health care provider. Do not stop taking the antibiotic even if you start to feel better.    SEEK IMMEDIATE MEDICAL CARE IF YOU HAVE ANY OF THE FOLLOWING SYMPTOMS: severe back or abdominal pain, fever, inability to keep fluids or medicine down, dizziness/lightheadedness, or a change in mental status.      Acute Urinary Retention in Women    WHAT YOU NEED TO KNOW:    Acute urinary retention (AUR) is when your bladder is full, but you cannot urinate. This condition happens suddenly, gets worse quickly, and lasts a short time.     DISCHARGE INSTRUCTIONS:    Medicines:     Antibiotics help treat or prevent a bacterial infection.      Take your medicine as directed. Contact your healthcare provider if you think your medicine is not helping or if you have side effects. Tell him if you are allergic to any medicine. Keep a list of the medicines, vitamins, and herbs you take. Include the amounts, and when and why you take them. Bring the list or the pill bottles to follow-up visits. Carry your medicine list with you in case of an emergency.    Gaviria catheter care: You may need a Gaviria catheter while you are at home. Healthcare providers will give you a smaller leg bag to collect urine. Keep the bag below your waist. This will prevent urine from flowing back into your bladder and causing an infection or other problems. Also, keep the tube free of kinks so the urine will drain properly. Do not pull on the catheter. This can cause pain and bleeding, and may cause the catheter to come out. Ask your healthcare provider for more information on Gaviria catheter care.    Follow up with your healthcare provider as directed: Write down your questions so you remember to ask them during your visits.     Contact your healthcare provider if:     You have a fever.      You have pain when you urinate.      You see blood in your urine.      You have problems with your catheter.      You have questions or concerns about your condition or care.    Return to the emergency department if:     You have severe abdominal pain.      You are breathing faster than usual.      Your heartbeat is faster than usual.      Your face, hands, feet, or ankles are swollen.          © Copyright [employer] 2019 All illustrations and images included in CareNotes are the copyrighted property of [employer] or [employer].

## 2020-11-06 NOTE — ED PROVIDER NOTE - PATIENT PORTAL LINK FT
You can access the FollowMyHealth Patient Portal offered by [facility] by registering at the following website: [email or web address]. By joining [email or web address]’s FollowMyHealth portal, you will also be able to view your health information using other applications (apps) compatible with our system.

## 2020-11-06 NOTE — ED PROVIDER NOTE - PHYSICAL EXAMINATION
CONSTITUTIONAL: Well-developed; well-nourished; in no acute distress.   SKIN: warm, dry.  HEAD: Normocephalic; atraumatic.  EYES: PERRL, EOMI, no conjunctival erythema.  ENT: No nasal discharge; airway clear.  NECK: Supple; non tender.  CARD: S1, S2 normal; no murmurs, gallops, or rubs. Regular rate and rhythm.   RESP: No wheezes, rales or rhonchi.  ABD: soft nondistended, mild tender in the suprapubic region. No CVA tenderness bilaterally.   EXT: Normal ROM.  2+ pitting edema to bilateral LEs.   NEURO: Alert, oriented, grossly unremarkable.  PSYCH: Cooperative, appropriate.

## 2020-11-06 NOTE — ED ADULT TRIAGE NOTE - CHIEF COMPLAINT QUOTE
c/o urinary frequency, now c/o retention, hx of prolapsed uterus c/o urinary frequency, now c/o retention since 0700, hx of prolapsed uterus

## 2020-11-06 NOTE — ED PROVIDER NOTE - CARE PROVIDER_API CALL
Shelli Jamison)  [facility]; Obstetrics and Gynecology  [address] [place] [address]  Phone: [phone]  Fax: [phone]  Follow Up Time:

## 2020-11-06 NOTE — ED ADULT NURSE NOTE - NSIMPLEMENTINTERV_GEN_ALL_ED
Implemented All Fall Risk Interventions:  [place] to call system. Call bell, personal items and telephone within reach. Instruct patient to call for assistance. Room bathroom lighting operational. Non-slip footwear when patient is off stretcher. Physically safe environment: no spills, clutter or unnecessary equipment. Stretcher in lowest position, wheels locked, appropriate side rails in place. Provide visual cue, wrist band, yellow gown, etc. Monitor gait and stability. Monitor for mental status changes and reorient to person, place, and time. Review medications for side effects contributing to fall risk. Reinforce activity limits and safety measures with patient and family.

## 2020-11-06 NOTE — ED PROVIDER NOTE - ATTENDING CONTRIBUTION TO CARE
89F with PMH HTN, urinary retention, prolapsed bladder who presents to [facility] for dysuria, urinary urgency, hesitancy and worsening nocturia. She is followed by Dr. Jamison who had UA done yesterday but results are not back. Denies n/v/d/f/c, flank pain.     Gen - NAD, Head - NCAT, TMs - clear b/l, Pharynx - clear, MMM, Heart - RRR, no m/g/r, Lungs - CTAB, no w/c/r, Abdomen - suprapubic distention, dullness to percussion, NT, ND, Skin - No rash, Extremities - FROM, no edema, erythema, ecchymosis, Neuro - CN 2-12 intact, nl strength and sensation, nl gait.    P: suspect urinary retention, will assess for UTI, place curran, check renal function, and will reassess.

## 2020-11-06 NOTE — ED PROVIDER NOTE - CARE PLAN
Principal Discharge DX:	Urinary retention   Principal Discharge DX:	Urinary retention  Secondary Diagnosis:	UTI (urinary tract infection)

## 2020-11-06 NOTE — ED PROVIDER NOTE - NS ED ROS FT
Constitutional: No fevers.   Eyes:  No visual changes, eye pain or discharge.  ENMT:  No sore throat or runny nose.  Cardiac:  No chest pain, SOB or edema.   Respiratory:  No cough or respiratory distress. No hemoptysis.  GI:  +suprapubic pain  :  +dysuria. +urinary retention.   MS:  No myalgia, muscle weakness, joint pain or back pain.  Neuro:  No headache or weakness.  No LOC.  Skin:  No skin rash.   Endocrine: No history of thyroid disease or diabetes.

## 2020-11-06 NOTE — ED PROVIDER NOTE - CLINICAL SUMMARY MEDICAL DECISION MAKING FREE TEXT BOX
89F with PMH HTN, urinary retention, prolapsed bladder who presents to [facility] for dysuria, urinary urgency, hesitancy and worsening nocturia. She is followed by Dr. Jamison who had UA done yesterday but results are not back. Denies n/v/d/f/c, flank pain. Pt with suprapubic distention, dullness to percussion s/p curran with >500cc cloudy urine. UA with large LE< 720 WBC, 21 RBC. pt denies recent abx. Given 1g rocephin and d/c with cefpodoxime. Pt agrees to f/u with Dr. Jamison. Pt given strict return precautions, curran catheter care, and voices understanding and agrees with plan of care. I have fully discussed the medical management and delivery of care with the patient. I have discussed any available labs, imaging and treatment options with the patient. All Questions answered at the bedside. Patient confirms understanding and has been given detailed return precautions. Patient instructed to return to the ED should symptoms persist or worsen. Patient has demonstrated capacity and has verbalized understanding. Patient is well appearing upon discharge, ambulatory with a steady gait.

## 2020-11-07 LAB
CULTURE RESULTS: NO GROWTH — SIGNIFICANT CHANGE UP
SPECIMEN SOURCE: SIGNIFICANT CHANGE UP

## 2020-11-11 ENCOUNTER — NON-APPOINTMENT (OUTPATIENT)
Age: 85
End: 2020-11-11

## 2020-11-12 ENCOUNTER — NON-APPOINTMENT (OUTPATIENT)
Age: 85
End: 2020-11-12

## 2020-11-16 ENCOUNTER — APPOINTMENT (OUTPATIENT)
Dept: UROGYNECOLOGY | Facility: CLINIC | Age: 85
End: 2020-11-16
Payer: MEDICARE

## 2020-11-16 VITALS
HEIGHT: 58 IN | WEIGHT: 122 LBS | DIASTOLIC BLOOD PRESSURE: 71 MMHG | SYSTOLIC BLOOD PRESSURE: 142 MMHG | BODY MASS INDEX: 25.61 KG/M2 | HEART RATE: 81 BPM

## 2020-11-16 PROCEDURE — 99213 OFFICE O/P EST LOW 20 MIN: CPT | Mod: 25

## 2020-11-16 PROCEDURE — 51700 IRRIGATION OF BLADDER: CPT

## 2020-11-16 NOTE — COUNSELING
[FreeTextEntry1] : If you feel like you have an infection it is important for you to call our office and we will arrange testing of your urine.\par \par Schedule a 1 months follow up and possible pessary placement. \par

## 2020-11-16 NOTE — HISTORY OF PRESENT ILLNESS
[FreeTextEntry1] : Pt is here for voiding trial. \par Last seen on 8/13/20 for pessary fitting for rectocle. Ring and support #4 (it arrived in the office but pt did not place it in due to diarrhea, IBS)\par \par s/p SAGRARIO/BS0/appy (fibroids)\par no Glaucoma\par \par Denies stress incontinence\par Not sexually active\par No history of incomplete bladder emptying without reduction. \par \par 11/7/20: urine cx: negative\par \par Pt went to the ER on 11/6 and was treated for UTI and curran placed due to inability to urinate. Urine culture was negative. Pt was also seen by GI in September and told she has IBS and started on a new IBS medication. Pt states that since then her diarrhea has resolved. \par Today pt is c/o frequent urination but just during the night. Does not want any medications for it, and does not want to put in the pessary today, worried that she is still irritated from diarrhea.

## 2020-11-16 NOTE — DISCUSSION/SUMMARY
[FreeTextEntry1] : Nocturia\par Discussed starting medication with pt. She does not want to take medication at this time. \par Advised to call if she cannot urinate or feels that she has infection. \par \par Rectocele\par F/u in 1 month for possible pessary placement. \par Advised to call if she has any concerned or questions.

## 2020-11-16 NOTE — PHYSICAL EXAM
[Chaperone Present] : A chaperone was present in the examining room during all aspects of the physical examination [FreeTextEntry1] : Patient presents to the office for a voiding trial. Patient identified. Procedure explained to the patient. Patient has bag which is draining 200 cc clear, yellow urine. Patient placed in lithotomy position.  Gaviria disconnected from bag. Instilled 240 cc sterile water into bladder until patient felt a strong urge to void. Using a 10 cc syringe, catheter balloon deflated and Gaviria catheter removed without difficulty. Assisted patient to the commode where she voided  250 cc without difficulty.\par \par  [No Acute Distress] : in no acute distress [Well developed] : well developed [Well Nourished] : ~L well nourished

## 2020-12-03 ENCOUNTER — NON-APPOINTMENT (OUTPATIENT)
Age: 85
End: 2020-12-03

## 2021-04-01 ENCOUNTER — APPOINTMENT (OUTPATIENT)
Dept: UROGYNECOLOGY | Facility: CLINIC | Age: 86
End: 2021-04-01
Payer: MEDICARE

## 2021-04-01 VITALS
HEIGHT: 58 IN | SYSTOLIC BLOOD PRESSURE: 176 MMHG | DIASTOLIC BLOOD PRESSURE: 76 MMHG | BODY MASS INDEX: 25.61 KG/M2 | WEIGHT: 122 LBS | HEART RATE: 84 BPM

## 2021-04-01 PROCEDURE — 99213 OFFICE O/P EST LOW 20 MIN: CPT

## 2021-04-02 NOTE — HISTORY OF PRESENT ILLNESS
[FreeTextEntry1] : Patient is here for pessary placement.\par Last seen 11/16/2020 for voiding trial \par \par s/p SAGRARIO/BS0/appy (fibroids)\par no Glaucoma\par \par Denies stress incontinence\par Not sexually active\par No history of incomplete bladder emptying without reduction. \par \par 11/7/20: urine cx: negative\par Pt treated in the ER for UTI and had curran placed due to difficulty urinating. Curran removed on 11/16/2020 in the office.\par \par Fitted\par Ring and support #4 for rectocele. But did not want it placed previously due to diarrhea from IBS.\par \par Patient is doing well and has no complaints today. Wants to have the pessary placed today, states that her IBS is well controlled. \par

## 2021-04-02 NOTE — COUNSELING
[FreeTextEntry1] : Please call the office if you have any issues with vaginal pain, vaginal bleeding, difficulty urinating or having a bowel movement or if the pessary falls out so that we can arrange another size pessary.\par \par Please follow up for pessary maintenance in 3 weeks with SHA Brown\lauren

## 2021-04-02 NOTE — DISCUSSION/SUMMARY
[FreeTextEntry1] : Rectocele\par Ring and support #4 was inserted without difficulty. Pessary remained in place with valsalva, coughing, and ambulating.\par The patient tolerated this well. \par Precautions given.\par The patient will follow up in 3 weeks for routine pessary management and self teaching.\par

## 2021-04-26 ENCOUNTER — APPOINTMENT (OUTPATIENT)
Dept: UROGYNECOLOGY | Facility: CLINIC | Age: 86
End: 2021-04-26
Payer: MEDICARE

## 2021-04-26 VITALS
BODY MASS INDEX: 25.61 KG/M2 | HEIGHT: 58 IN | SYSTOLIC BLOOD PRESSURE: 154 MMHG | WEIGHT: 122 LBS | DIASTOLIC BLOOD PRESSURE: 75 MMHG | HEART RATE: 84 BPM

## 2021-04-26 PROCEDURE — 99213 OFFICE O/P EST LOW 20 MIN: CPT

## 2021-04-26 NOTE — DISCUSSION/SUMMARY
[FreeTextEntry1] : Rectocele\par RIng and support # 4 removed, cleaned, inspected and reinserted. The patient tolerated this well. \par No bleeding, lesions, abnormal discharge were noted. \par The patient will follow up in 3 months for routine pessary management.\par \par Constipation\par Advised to use bowel recipe

## 2021-04-26 NOTE — COUNSELING
[FreeTextEntry1] : Please call the office if you have any issues with vaginal pain, vaginal bleeding, difficulty urinating or having a bowel movement or if the pessary falls out so that we can arrange another size pessary.\par \par Please continue to apply a pea size amount of the cream to the opening of the vagina three nights per week.\par \par Please follow up for pessary maintenance in 3 months with SHA Brown\par

## 2021-04-26 NOTE — HISTORY OF PRESENT ILLNESS
[FreeTextEntry1] : Patient is here for routine pessary cleaning for rectocele\par Last seen 4/1/21 for pessary placement. ring and support # 4\par \par s/p SAGRARIO/BS0/appy (fibroids)\par no Glaucoma\par \par Denies stress incontinence\par Not sexually active\par No history of incomplete bladder emptying without reduction. \par \par 11/7/20: urine cx: negative\par Pt treated in the ER for UTI and had curran placed due to difficulty urinating. Curran removed on 11/16/2020 in the office.\par \par Fitted\par Ring and support #4 for rectocele. But did not want it placed previously due to diarrhea from IBS.\par \par Bowel recipe for constipation\par \par Today, patient is doing well. C/o occasional constipation. Is not using the bowel recipe. \par

## 2021-07-29 ENCOUNTER — APPOINTMENT (OUTPATIENT)
Dept: UROGYNECOLOGY | Facility: CLINIC | Age: 86
End: 2021-07-29
Payer: MEDICARE

## 2021-07-29 VITALS
BODY MASS INDEX: 27.08 KG/M2 | DIASTOLIC BLOOD PRESSURE: 76 MMHG | WEIGHT: 129 LBS | SYSTOLIC BLOOD PRESSURE: 150 MMHG | HEIGHT: 58 IN | HEART RATE: 86 BPM

## 2021-07-29 PROCEDURE — 99213 OFFICE O/P EST LOW 20 MIN: CPT

## 2021-07-29 NOTE — DISCUSSION/SUMMARY
[FreeTextEntry1] : Rectocele\par Ring and support #4 removed, cleaned, inspected and reinserted. The patient tolerated this well. \par No bleeding, lesions, abnormal discharge were noted. \par The patient will follow up in 3 months for routine pessary management.\par \par Atrophic Vaginitis:\par Advised to continue to apply a pea size amount of the cream to the opening of the vagina three nights per week.\par

## 2021-07-29 NOTE — HISTORY OF PRESENT ILLNESS
[FreeTextEntry1] : Patient is here for routine pessary cleaning for rectocele.\par Last seen on 4/26/21 for pessary cleaning. ring and support #4\par \par s/p SAGRARIO/BS0/appy (fibroids)\par no Glaucoma\par \par Denies stress incontinence\par Not sexually active\par No history of incomplete bladder emptying without reduction. \par \par 11/7/20: urine cx: negative\par Pt treated in the ER for UTI and had curran placed due to difficulty urinating. Curran removed on 11/16/2020 in the office.\par \par Fitted\par Ring and support #4 for rectocele. But did not want it placed previously due to diarrhea from IBS.\par \par Bowel recipe for constipation\par \par Today, patient is doing well and has no complaints. Has been eating fruits and veggies to help with constipation instead of bowel recipe. \par

## 2021-08-25 NOTE — PHYSICAL THERAPY INITIAL EVALUATION ADULT - STANDING BALANCE: STATIC
Additional Notes (Optional): Area is well healed and no further treatment is needed Detail Level: Detailed Hide Additional Notes?: No with RW/good minus

## 2021-10-21 ENCOUNTER — APPOINTMENT (OUTPATIENT)
Dept: UROGYNECOLOGY | Facility: CLINIC | Age: 86
End: 2021-10-21
Payer: MEDICARE

## 2021-10-21 VITALS
BODY MASS INDEX: 27.5 KG/M2 | SYSTOLIC BLOOD PRESSURE: 153 MMHG | DIASTOLIC BLOOD PRESSURE: 89 MMHG | WEIGHT: 131 LBS | HEART RATE: 94 BPM | HEIGHT: 58 IN

## 2021-10-21 PROCEDURE — 99213 OFFICE O/P EST LOW 20 MIN: CPT

## 2021-10-21 NOTE — DISCUSSION/SUMMARY
[FreeTextEntry1] : Rectocele\par Ring and support # 4 removed, cleaned, inspected and reinserted. The patient tolerated this well. \par No bleeding, lesions, abnormal discharge were noted. \par The patient will follow up in 3 months for routine pessary management.\par \par Atrophic Vaginitis:\par Advised to continue to apply a pea size amount of the cream to the opening of the vagina three nights per week.\par

## 2021-10-21 NOTE — HISTORY OF PRESENT ILLNESS
[FreeTextEntry1] : Patient is here for routine pessary cleaning for rectocele.\par Last seen 7/29/21 for pessary cleaning. ring and support # 4\par \par s/p SAGRARIO/BS0/appy (fibroids)\par no Glaucoma\par \par Denies stress incontinence\par Not sexually active\par No history of incomplete bladder emptying without reduction. \par \par 11/7/20: urine cx: negative\par Pt treated in the ER for UTI and had curran placed due to difficulty urinating. Curran removed on 11/16/2020 in the office.\par \par Fitted\par Ring and support #4 for rectocele. But did not want it placed previously due to diarrhea from IBS.\par \par Today, patient is doing well and has no complaints. Increased her fiber and does not need bowel recipe. \par

## 2021-11-17 ENCOUNTER — OUTPATIENT (OUTPATIENT)
Dept: OUTPATIENT SERVICES | Facility: HOSPITAL | Age: 86
LOS: 1 days | Discharge: HOME | End: 2021-11-17

## 2021-11-17 DIAGNOSIS — Z98.890 OTHER SPECIFIED POSTPROCEDURAL STATES: Chronic | ICD-10-CM

## 2021-11-17 DIAGNOSIS — Z96.642 PRESENCE OF LEFT ARTIFICIAL HIP JOINT: Chronic | ICD-10-CM

## 2021-11-17 DIAGNOSIS — Z90.710 ACQUIRED ABSENCE OF BOTH CERVIX AND UTERUS: Chronic | ICD-10-CM

## 2021-11-18 DIAGNOSIS — Z78.0 ASYMPTOMATIC MENOPAUSAL STATE: ICD-10-CM

## 2021-11-18 DIAGNOSIS — Z87.310 PERSONAL HISTORY OF (HEALED) OSTEOPOROSIS FRACTURE: ICD-10-CM

## 2021-11-18 DIAGNOSIS — Z13.820 ENCOUNTER FOR SCREENING FOR OSTEOPOROSIS: ICD-10-CM

## 2021-11-18 DIAGNOSIS — M81.0 AGE-RELATED OSTEOPOROSIS WITHOUT CURRENT PATHOLOGICAL FRACTURE: ICD-10-CM

## 2021-12-06 ENCOUNTER — EMERGENCY (EMERGENCY)
Facility: HOSPITAL | Age: 86
LOS: 0 days | Discharge: HOME | End: 2021-12-06
Attending: EMERGENCY MEDICINE | Admitting: EMERGENCY MEDICINE
Payer: MEDICARE

## 2021-12-06 VITALS
RESPIRATION RATE: 18 BRPM | HEIGHT: 60 IN | TEMPERATURE: 98 F | SYSTOLIC BLOOD PRESSURE: 142 MMHG | DIASTOLIC BLOOD PRESSURE: 93 MMHG | HEART RATE: 75 BPM | OXYGEN SATURATION: 98 %

## 2021-12-06 VITALS
DIASTOLIC BLOOD PRESSURE: 70 MMHG | HEART RATE: 76 BPM | SYSTOLIC BLOOD PRESSURE: 147 MMHG | RESPIRATION RATE: 16 BRPM | OXYGEN SATURATION: 99 %

## 2021-12-06 DIAGNOSIS — Z96.642 PRESENCE OF LEFT ARTIFICIAL HIP JOINT: Chronic | ICD-10-CM

## 2021-12-06 DIAGNOSIS — Z79.82 LONG TERM (CURRENT) USE OF ASPIRIN: ICD-10-CM

## 2021-12-06 DIAGNOSIS — Z90.710 ACQUIRED ABSENCE OF BOTH CERVIX AND UTERUS: Chronic | ICD-10-CM

## 2021-12-06 DIAGNOSIS — I10 ESSENTIAL (PRIMARY) HYPERTENSION: ICD-10-CM

## 2021-12-06 DIAGNOSIS — E78.00 PURE HYPERCHOLESTEROLEMIA, UNSPECIFIED: ICD-10-CM

## 2021-12-06 DIAGNOSIS — Z98.890 OTHER SPECIFIED POSTPROCEDURAL STATES: Chronic | ICD-10-CM

## 2021-12-06 LAB
ANION GAP SERPL CALC-SCNC: 17 MMOL/L — HIGH (ref 7–14)
BUN SERPL-MCNC: 22 MG/DL — HIGH (ref 10–20)
CALCIUM SERPL-MCNC: 10 MG/DL — SIGNIFICANT CHANGE UP (ref 8.5–10.1)
CHLORIDE SERPL-SCNC: 101 MMOL/L — SIGNIFICANT CHANGE UP (ref 98–110)
CO2 SERPL-SCNC: 21 MMOL/L — SIGNIFICANT CHANGE UP (ref 17–32)
CREAT SERPL-MCNC: 0.9 MG/DL — SIGNIFICANT CHANGE UP (ref 0.7–1.5)
GLUCOSE SERPL-MCNC: 119 MG/DL — HIGH (ref 70–99)
POTASSIUM SERPL-MCNC: 4.3 MMOL/L — SIGNIFICANT CHANGE UP (ref 3.5–5)
POTASSIUM SERPL-SCNC: 4.3 MMOL/L — SIGNIFICANT CHANGE UP (ref 3.5–5)
SODIUM SERPL-SCNC: 139 MMOL/L — SIGNIFICANT CHANGE UP (ref 135–146)

## 2021-12-06 PROCEDURE — 99284 EMERGENCY DEPT VISIT MOD MDM: CPT | Mod: GC

## 2021-12-06 NOTE — ED PROVIDER NOTE - ATTENDING CONTRIBUTION TO CARE
I personally evaluated the patient. I reviewed the Resident’s or Physician Assistant’s note (as assigned above), and agree with the findings and plan except as documented in my note.    89yo F with hx of htn here with concern about her BP. high at home in 190s. yesterday felt feeling in her head that she cannot characterize. at best says it made her feel fatigued. adamantly denies ha, neck pain, change in vision or speech, w/n/t, sob or cp. asx now, compliant wth meds. on exam, nontoxic, vss, bp improved.   Gen: Alert, NAD  Skin: Warm, dry, intact  Head: NCAT, PERRL  ENT: Mucous membranes moist  Neck: Supple, no meningismus or bruits  CV: RRR, normal S1, S2, no m/r/g  Resp: Non labored respirations, Lungs CTA b/l, no wheezes, rales, rhonchi  Abdomen: Soft, nondistended, nontender  Extremities: Moving all extremities, no edema  Neuro: No focal neuro deficits, AAOx3, CNs II-XII intact, strength 5/5 in b/l shoulder abduction/adduction, elbow flexion/extension, wrist flexion/extension, interossei, hand , hip flexion/extension, knee flexion/extension, ankle flexion/extension, great toe flexion/extension, sensation grossly intact, FTN intact, no dysmetria, normal gait, no ataxia, no drift  Psych: Cooperative     a/p: pt here with htn. no focal deficts and no concerning story for cva or hypertensive emergency. no cp so defer cardiac w/up. with reassuring bp here, asymptomatic and normal exam, do not feel further emergent w/up is needed at this time. Return precautions discussed. cr wnl. advised to remain hydrated,

## 2021-12-06 NOTE — ED ADULT NURSE NOTE - NSICDXPASTSURGICALHX_GEN_ALL_CORE_FT
Patient Instructions by Elizabeth Morton MD at 10/23/2019  2:00 PM     Author: Elizabeth Morton MD Service: -- Author Type: Physician    Filed: 10/23/2019  3:27 PM Encounter Date: 10/23/2019 Status: Addendum    : Elizabeth Morton MD (Physician)    Related Notes: Original Note by Elizabeth Morton MD (Physician) filed at 10/23/2019  3:26 PM          Patient Education      BRIGHT FUTURES HANDOUT- PARENT  11 THROUGH 14 YEAR VISITS  Here are some suggestions from [employer] experts that may be of value to your family.      HOW YOUR FAMILY IS DOING  Encourage your child to be part of family decisions. Give your child the chance to make more of her own decisions as she grows older.  Encourage your child to think through problems with your support.  Help your child find activities she is really interested in, besides schoolwork.  Help your child find and try activities that help others.  Help your child deal with conflict.  Help your child figure out nonviolent ways to handle anger or fear.  If you are worried about your living or food situation, talk with us. Community agencies and programs such as [employer] can also provide information and assistance.    YOUR GROWING AND CHANGING CHILD  Help your child get to the dentist twice a year.  Give your child a fluoride supplement if the dentist recommends it.  Encourage your child to brush her teeth twice a day and floss once a day.  Praise your child when she does something well, not just when she looks good.  Support a healthy body weight and help your child be a healthy eater.  Provide healthy foods.  Eat together as a family.  Be a role model.  Help your child get enough calcium with low-fat or fat-free milk, low-fat yogurt, and cheese.  Encourage your child to get at least 1 hour of physical activity every day. Make sure she uses helmets and other safety gear.  Consider making a family media use plan. Make rules for media use and balance your natalee time for  physical activities and other activities.  Check in with your rigo teacher about grades. Attend back-to-school events, parent-teacher conferences, and other school activities if possible.  Talk with your child as she takes over responsibility for schoolwork.  Help your child with organizing time, if she needs it.  Encourage daily reading.  YOUR RIGO FEELINGS  Find ways to spend time with your child.  If you are concerned that your child is sad, depressed, nervous, irritable, hopeless, or angry, let us know.  Talk with your child about how his body is changing during puberty.  If you have questions about your rigo sexual development, you can always talk with us.    HEALTHY BEHAVIOR CHOICES  Help your child find fun, safe things to do.  Make sure your child knows how you feel about alcohol and drug use.  Know your rigo friends and their parents. Be aware of where your child is and what he is doing at all times.  Lock your liquor in a cabinet.  Store prescription medications in a locked cabinet.  Talk with your child about relationships, sex, and values.  If you are uncomfortable talking about puberty or sexual pressures with your child, please ask us or others you trust for reliable information that can help.  Use clear and consistent rules and discipline with your child.  Be a role model.    SAFETY  Make sure everyone always wears a lap and shoulder seat belt in the car.  Provide a properly fitting helmet and safety gear for biking, skating, in-line skating, skiing, snowmobiling, and horseback riding.  Use a hat, sun protection clothing, and sunscreen with SPF of 15 or higher on her exposed skin. Limit time outside when the sun is strongest (11:00 am-3:00 pm).  Dont allow your child to ride ATVs.  Make sure your child knows how to get help if she feels unsafe.  If it is necessary to keep a gun in your home, store it unloaded and locked with the ammunition locked separately from the gun.      Helpful  Resources:  Family Media Use Plan: www.healthychildren.org/MediaUsePlan   Consistent with Bright Futures: Guidelines for Health Supervision of Infants, Children, and Adolescents, 4th Edition  For more information, go to https://brightfutures.aap.org.            Patient Education      BRIGHT FUTURES HANDOUT- PATIENT  11 THROUGH 14 YEAR VISITS  Here are some suggestions from [employer] experts that may be of value to your family.     HOW YOU ARE DOING  Enjoy spending time with your family. Look for ways to help out at home.  Follow your familys rules.  Try to be responsible for your schoolwork.  If you need help getting organized, ask your parents or teachers.  Try to read every day.  Find activities you are really interested in, such as sports or theater.  Find activities that help others.  Figure out ways to deal with stress in ways that work for you.  Dont smoke, vape, use drugs, or drink alcohol. Talk with us if you are worried about alcohol or drug use in your family.  Always talk through problems and never use violence.  If you get angry with someone, try to walk away.    HEALTHY BEHAVIOR CHOICES  Find fun, safe things to do.  Talk with your parents about alcohol and drug use.  Say No! to drugs, alcohol, cigarettes and e-cigarettes, and sex. Saying No! is OK.  Dont share your prescription medicines; dont use other peoples medicines.  Choose friends who support your decision not to use tobacco, alcohol, or drugs. Support friends who choose not to use.  Healthy dating relationships are built on respect, concern, and doing things both of you like to do.  Talk with your parents about relationships, sex, and values.  Talk with your parents or another adult you trust about puberty and sexual pressures. Have a plan for how you will handle risky situations.    YOUR GROWING AND CHANGING BODY  Brush your teeth twice a day and floss once a day.  Visit the dentist twice a year.  Wear a mouth guard when playing  sports.  Be a healthy eater. It helps you do well in school and sports.  Have vegetables, fruits, lean protein, and whole grains at meals and snacks.  Limit fatty, sugary, salty foods that are low in nutrients, such as candy, chips, and ice cream.  Eat when youre hungry. Stop when you feel satisfied.  Eat with your family often.  Eat breakfast.  Choose water instead of soda or sports drinks.  Aim for at least 1 hour of physical activity every day.  Get enough sleep.    YOUR FEELINGS  Be proud of yourself when you do something good.  Its OK to have up-and-down moods, but if you feel sad most of the time, let us know so we can help you.  Its important for you to have accurate information about sexuality, your physical development, and your sexual feelings toward the opposite or same sex. Ask us if you have any questions.    STAYING SAFE  Always wear your lap and shoulder seat belt.  Wear protective gear, including helmets, for playing sports, biking, skating, skiing, and skateboarding.  Always wear a life jacket when you do water sports.  Always use sunscreen and a hat when youre outside. Try not to be outside for too long between 11:00 am and 3:00 pm, when its easy to get a sunburn.  Dont ride ATVs.  Dont ride in a car with someone who has used alcohol or drugs. Call your parents or another trusted adult if you are feeling unsafe.  Fighting and carrying weapons can be dangerous. Talk with your parents, teachers, or doctor about how to avoid these situations.      Consistent with Bright Futures: Guidelines for Health Supervision of Infants, Children, and Adolescents, 4th Edition  For more information, go to https://brightfutures.aap.org.           1. Recommend allowing any blistering to heal in the next 3-5 days.   2. After any blistering is healed, recommend soaking the feet in warm water for 5 minutes daily.  3. Then use a pumice stone to scrape off the callous.  4. Then apply Salicylic acid (wart remover treatment)  daily   5. Return to clinic in 3-4 weeks if not improving or worsening.          PAST SURGICAL HISTORY:  H/O lumpectomy 2015, right, pt states benign pathology    History of hysterectomy     S/P hip replacement, left partial

## 2021-12-06 NOTE — ED PROVIDER NOTE - NSFOLLOWUPINSTRUCTIONS_ED_ALL_ED_FT
Hypertension    please follow up with your primary care doctor in 4-5 days for your high blood pressure.     Hypertension, commonly called high blood pressure, is when the force of blood pumping through the arteries is too strong. The arteries are the blood vessels that carry blood from the heart throughout the body. Hypertension forces the heart to work harder to pump blood and may cause arteries to become narrow or stiff. Having untreated or uncontrolled hypertension can cause heart attacks, strokes, kidney disease, and other problems.    A blood pressure reading consists of a higher number over a lower number. Ideally, your blood pressure should be below 120/80. The first ("top") number is called the systolic pressure. It is a measure of the pressure in your arteries as your heart beats. The second ("bottom") number is called the diastolic pressure. It is a measure of the pressure in your arteries as the heart relaxes.    What are the causes?  The cause of this condition is not known.    What increases the risk?  Some risk factors for high blood pressure are under your control. Others are not.    Factors you can change     Smoking.  Having type 2 diabetes mellitus, high cholesterol, or both.  Not getting enough exercise or physical activity.  Being overweight.  Having too much fat, sugar, calories, or salt (sodium) in your diet.  Drinking too much alcohol.  Factors that are difficult or impossible to change     Having chronic kidney disease.  Having a family history of high blood pressure.  Age. Risk increases with age.  Race. You may be at higher risk if you are -American.  Gender. Men are at higher risk than women before age 45. After age 65, women are at higher risk than men.  Having obstructive sleep apnea.  Stress.  What are the signs or symptoms?  Extremely high blood pressure (hypertensive crisis) may cause:    Headache.  Anxiety.  Shortness of breath.  Nosebleed.  Nausea and vomiting.  Severe chest pain.  Jerky movements you cannot control (seizures).    How is this diagnosed?  This condition is diagnosed by measuring your blood pressure while you are seated, with your arm resting on a surface. The cuff of the blood pressure monitor will be placed directly against the skin of your upper arm at the level of your heart. It should be measured at least twice using the same arm. Certain conditions can cause a difference in blood pressure between your right and left arms.    Certain factors can cause blood pressure readings to be lower or higher than normal (elevated) for a short period of time:    When your blood pressure is higher when you are in a health care provider's office than when you are at home, this is called white coat hypertension. Most people with this condition do not need medicines.  When your blood pressure is higher at home than when you are in a health care provider's office, this is called masked hypertension. Most people with this condition may need medicines to control blood pressure.    If you have a high blood pressure reading during one visit or you have normal blood pressure with other risk factors:    You may be asked to return on a different day to have your blood pressure checked again.  You may be asked to monitor your blood pressure at home for 1 week or longer.    If you are diagnosed with hypertension, you may have other blood or imaging tests to help your health care provider understand your overall risk for other conditions.    How is this treated?  This condition is treated by making healthy lifestyle changes, such as eating healthy foods, exercising more, and reducing your alcohol intake. Your health care provider may prescribe medicine if lifestyle changes are not enough to get your blood pressure under control, and if:    Your systolic blood pressure is above 130.  Your diastolic blood pressure is above 80.    Your personal target blood pressure may vary depending on your medical conditions, your age, and other factors.    Follow these instructions at home:  Eating and drinking     Eat a diet that is high in fiber and potassium, and low in sodium, added sugar, and fat. An example eating plan is called the DASH (Dietary Approaches to Stop Hypertension) diet. To eat this way:    Eat plenty of fresh fruits and vegetables. Try to fill half of your plate at each meal with fruits and vegetables.  Eat whole grains, such as whole wheat pasta, brown rice, or whole grain bread. Fill about one quarter of your plate with whole grains.  Eat or drink low-fat dairy products, such as skim milk or low-fat yogurt.  Avoid fatty cuts of meat, processed or cured meats, and poultry with skin. Fill about one quarter of your plate with lean proteins, such as fish, chicken without skin, beans, eggs, and tofu.  Avoid premade and processed foods. These tend to be higher in sodium, added sugar, and fat.    Reduce your daily sodium intake. Most people with hypertension should eat less than 1,500 mg of sodium a day.  ImageLimit alcohol intake to no more than 1 drink a day for nonpregnant women and 2 drinks a day for men. One drink equals 12 oz of beer, 5 oz of wine, or 1½ oz of hard liquor.  Lifestyle     Work with your health care provider to maintain a healthy body weight or to lose weight. Ask what an ideal weight is for you.  Get at least 30 minutes of exercise that causes your heart to beat faster (aerobic exercise) most days of the week. Activities may include walking, swimming, or biking.  Include exercise to strengthen your muscles (resistance exercise), such as pilates or lifting weights, as part of your weekly exercise routine. Try to do these types of exercises for 30 minutes at least 3 days a week.  Do not use any products that contain nicotine or tobacco, such as cigarettes and e-cigarettes. If you need help quitting, ask your health care provider.  Monitor your blood pressure at home as told by your health care provider.  Keep all follow-up visits as told by your health care provider. This is important.  Medicines     Take over-the-counter and prescription medicines only as told by your health care provider. Follow directions carefully. Blood pressure medicines must be taken as prescribed.  Do not skip doses of blood pressure medicine. Doing this puts you at risk for problems and can make the medicine less effective.  Ask your health care provider about side effects or reactions to medicines that you should watch for.  Contact a health care provider if:  You think you are having a reaction to a medicine you are taking.  You have headaches that keep coming back (recurring).  You feel dizzy.  You have swelling in your ankles.  You have trouble with your vision.  Get help right away if:  You develop a severe headache or confusion.  You have unusual weakness or numbness.  You feel faint.  You have severe pain in your chest or abdomen.  You vomit repeatedly.  You have trouble breathing.  Summary  Hypertension is when the force of blood pumping through your arteries is too strong. If this condition is not controlled, it may put you at risk for serious complications.  Your personal target blood pressure may vary depending on your medical conditions, your age, and other factors. For most people, a normal blood pressure is less than 120/80.  Hypertension is treated with lifestyle changes, medicines, or a combination of both. Lifestyle changes include weight loss, eating a healthy, low-sodium diet, exercising more, and limiting alcohol.  This information is not intended to replace advice given to you by your health care provider. Make sure you discuss any questions you have with your health care provider.

## 2021-12-06 NOTE — ED PROVIDER NOTE - OBJECTIVE STATEMENT
89 year old female with 89 yo F w/ hx of HTN, IBS, uterine prolapse comes in for high blood pressure. pt takes her bp medications regularly and follows with Dr. Ariza.  Pt felt a "a little funny" yesterday and  checked her blood pressure which was in the "190s" and got anxous and decided to come into the ED. Pt is currrently asymptomatic and denies headaches, nausea, vomiting, chest pain. Pt has no other complaints.

## 2021-12-06 NOTE — ED PROVIDER NOTE - NSICDXPASTSURGICALHX_GEN_ALL_CORE_FT
PAST SURGICAL HISTORY:  H/O lumpectomy 2015, right, pt states benign pathology    History of hysterectomy     S/P hip replacement, left partial

## 2021-12-06 NOTE — ED PROVIDER NOTE - CLINICAL SUMMARY MEDICAL DECISION MAKING FREE TEXT BOX
89yo F with hx of htn here with concern about her BP. high at home in 190s. yesterday felt feeling in her head that she cannot characterize. at best says it made her feel fatigued. adamantly denies ha, neck pain, change in vision or speech, w/n/t, sob or cp. asx now, compliant wth meds. on exam, nontoxic, vss, bp improved.   Gen: Alert, NAD  Skin: Warm, dry, intact  Head: NCAT, PERRL  ENT: Mucous membranes moist  Neck: Supple, no meningismus or bruits  CV: RRR, normal S1, S2, no m/r/g  Resp: Non labored respirations, Lungs CTA b/l, no wheezes, rales, rhonchi  Abdomen: Soft, nondistended, nontender  Extremities: Moving all extremities, no edema  Neuro: No focal neuro deficits, AAOx3, CNs II-XII intact, strength 5/5 in b/l shoulder abduction/adduction, elbow flexion/extension, wrist flexion/extension, interossei, hand , hip flexion/extension, knee flexion/extension, ankle flexion/extension, great toe flexion/extension, sensation grossly intact, FTN intact, no dysmetria, normal gait, no ataxia, no drift  Psych: Cooperative     a/p: pt here with htn. no focal deficts and no concerning story for cva or hypertensive emergency. no cp so defer cardiac w/up. with reassuring bp here, asymptomatic and normal exam, do not feel further emergent w/up is needed at this time. Return precautions discussed. cr wnl. advised to remain hydrated,

## 2021-12-06 NOTE — ED PROVIDER NOTE - PATIENT PORTAL LINK FT
You can access the FollowMyHealth Patient Portal offered by [facility] by registering at the following website: [email or web address]. By joining [facility]’s FollowMyHealth portal, you will also be able to view your health information using other applications (apps) compatible with our system.

## 2021-12-06 NOTE — ED ADULT NURSE NOTE - OBJECTIVE STATEMENT
89 y/o female c/o hypertension, which started last night. "My blood pressure has been high", pt took morning BP medication, Pt current /70 HR 76, pt denies chest pain, vision changes.

## 2021-12-06 NOTE — ED ADULT NURSE NOTE - NSICDXPASTMEDICALHX_GEN_ALL_CORE_FT
PAST MEDICAL HISTORY:  Dyslipidemia     Essential hypertension     Lung nodule     Osteoporosis

## 2022-01-31 ENCOUNTER — APPOINTMENT (OUTPATIENT)
Dept: UROGYNECOLOGY | Facility: CLINIC | Age: 87
End: 2022-01-31
Payer: MEDICARE

## 2022-01-31 VITALS
BODY MASS INDEX: 27.5 KG/M2 | HEART RATE: 80 BPM | SYSTOLIC BLOOD PRESSURE: 165 MMHG | WEIGHT: 131 LBS | DIASTOLIC BLOOD PRESSURE: 72 MMHG | HEIGHT: 58 IN

## 2022-01-31 PROCEDURE — 99213 OFFICE O/P EST LOW 20 MIN: CPT

## 2022-01-31 NOTE — DISCUSSION/SUMMARY
[FreeTextEntry1] : Rectocele:\par Ring and support #4 removed, cleaned, inspected and reinserted. The patient tolerated this well. \par No bleeding, lesions, abnormal discharge were noted. \par The patient will follow up in 3 months for routine pessary management.\par \par Atrophic Vaginitis:\par Advised to continue to apply a pea size amount of the cream to the opening of the vagina three nights per week.\par

## 2022-01-31 NOTE — HISTORY OF PRESENT ILLNESS
[FreeTextEntry1] : Patient is here for routine pessary cleaning for rectocele.\par Last seen 10/21/2021 for pessary cleaning. ring and support #4\par \par s/p SAGRARIO/BS0/appy (fibroids)\par no Glaucoma\par \par Denies stress incontinence\par Not sexually active\par No history of incomplete bladder emptying without reduction. \par \par 11/7/20: urine cx: negative\par Pt treated in the ER for UTI and had curran placed due to difficulty urinating. Curran removed on 11/16/2020 in the office.\par \par Fitted\par Ring and support #4 for rectocele. But did not want it placed previously due to diarrhea from IBS.\par \par Today, patient is doing well and has no complaints. Happy with the pessary.\par

## 2022-02-15 NOTE — DISCHARGE NOTE ADULT - HOSPITAL COURSE
Detail Level: Zone Additional Notes: Covid-19 vaccination status was not disclosed Quality 110: Preventive Care And Screening: Influenza Immunization: Influenza Immunization Administered during Influenza season 89/F hx of HTN, p/w fall from standing after loss of balance.    On day of presentation pt fell from one step and landed on left side. She reports she lost her balance and fell to her left side and had pain to left side. Pt was unable to stand, has left hip pain radiating down left leg. Neighbors that live below heard the fall and came to help her. No other injuries, no LOC, no chest pain, no SOB. No palpatations, no chest pain, no sob.    Was found to have a left femoral neck hip fracture s/p post L Hemiarthroplasty on 10/2/2018 with no complications   The hospital stay was not complicated, patient is hemodynamically stable, hemoglobin has been stable   Patient tolerated ambulation and food   Is cleared for discharge to 4A

## 2022-04-18 ENCOUNTER — EMERGENCY (EMERGENCY)
Facility: HOSPITAL | Age: 87
LOS: 0 days | Discharge: HOME | End: 2022-04-19
Attending: STUDENT IN AN ORGANIZED HEALTH CARE EDUCATION/TRAINING PROGRAM | Admitting: STUDENT IN AN ORGANIZED HEALTH CARE EDUCATION/TRAINING PROGRAM
Payer: MEDICARE

## 2022-04-18 VITALS
OXYGEN SATURATION: 98 % | HEART RATE: 98 BPM | TEMPERATURE: 97 F | SYSTOLIC BLOOD PRESSURE: 180 MMHG | RESPIRATION RATE: 18 BRPM | WEIGHT: 134.92 LBS | DIASTOLIC BLOOD PRESSURE: 98 MMHG | HEIGHT: 60 IN

## 2022-04-18 DIAGNOSIS — I10 ESSENTIAL (PRIMARY) HYPERTENSION: ICD-10-CM

## 2022-04-18 DIAGNOSIS — Z96.642 PRESENCE OF LEFT ARTIFICIAL HIP JOINT: Chronic | ICD-10-CM

## 2022-04-18 DIAGNOSIS — Z79.82 LONG TERM (CURRENT) USE OF ASPIRIN: ICD-10-CM

## 2022-04-18 DIAGNOSIS — K58.9 IRRITABLE BOWEL SYNDROME WITHOUT DIARRHEA: ICD-10-CM

## 2022-04-18 DIAGNOSIS — Z98.890 OTHER SPECIFIED POSTPROCEDURAL STATES: Chronic | ICD-10-CM

## 2022-04-18 DIAGNOSIS — R53.1 WEAKNESS: ICD-10-CM

## 2022-04-18 DIAGNOSIS — Z96.642 PRESENCE OF LEFT ARTIFICIAL HIP JOINT: ICD-10-CM

## 2022-04-18 DIAGNOSIS — R42 DIZZINESS AND GIDDINESS: ICD-10-CM

## 2022-04-18 DIAGNOSIS — N39.0 URINARY TRACT INFECTION, SITE NOT SPECIFIED: ICD-10-CM

## 2022-04-18 DIAGNOSIS — Z90.710 ACQUIRED ABSENCE OF BOTH CERVIX AND UTERUS: Chronic | ICD-10-CM

## 2022-04-18 PROCEDURE — 99285 EMERGENCY DEPT VISIT HI MDM: CPT | Mod: FS

## 2022-04-18 NOTE — ED ADULT TRIAGE NOTE - CHIEF COMPLAINT QUOTE
Pt BIBA for c/o of dizziness. Pt states she got up from her chair and felt dizzy. PT states she did not fall or lose consciousness. Pt states she feels very dehydrated and didn't drink a lot of water today. Cleared to main by MD Ash

## 2022-04-19 LAB
ALBUMIN SERPL ELPH-MCNC: 4.8 G/DL — SIGNIFICANT CHANGE UP (ref 3.5–5.2)
ALP SERPL-CCNC: 72 U/L — SIGNIFICANT CHANGE UP (ref 30–115)
ALT FLD-CCNC: 17 U/L — SIGNIFICANT CHANGE UP (ref 0–41)
ANION GAP SERPL CALC-SCNC: 14 MMOL/L — SIGNIFICANT CHANGE UP (ref 7–14)
APPEARANCE UR: CLEAR — SIGNIFICANT CHANGE UP
AST SERPL-CCNC: 20 U/L — SIGNIFICANT CHANGE UP (ref 0–41)
BACTERIA # UR AUTO: NEGATIVE — SIGNIFICANT CHANGE UP
BASOPHILS # BLD AUTO: 0.06 K/UL — SIGNIFICANT CHANGE UP (ref 0–0.2)
BASOPHILS NFR BLD AUTO: 0.8 % — SIGNIFICANT CHANGE UP (ref 0–1)
BILIRUB SERPL-MCNC: 0.4 MG/DL — SIGNIFICANT CHANGE UP (ref 0.2–1.2)
BILIRUB UR-MCNC: NEGATIVE — SIGNIFICANT CHANGE UP
BUN SERPL-MCNC: 19 MG/DL — SIGNIFICANT CHANGE UP (ref 10–20)
CALCIUM SERPL-MCNC: 9.4 MG/DL — SIGNIFICANT CHANGE UP (ref 8.5–10.1)
CHLORIDE SERPL-SCNC: 105 MMOL/L — SIGNIFICANT CHANGE UP (ref 98–110)
CO2 SERPL-SCNC: 21 MMOL/L — SIGNIFICANT CHANGE UP (ref 17–32)
COLOR SPEC: SIGNIFICANT CHANGE UP
CREAT SERPL-MCNC: 0.8 MG/DL — SIGNIFICANT CHANGE UP (ref 0.7–1.5)
DIFF PNL FLD: NEGATIVE — SIGNIFICANT CHANGE UP
EGFR: 69 ML/MIN/1.73M2 — SIGNIFICANT CHANGE UP
EOSINOPHIL # BLD AUTO: 0.3 K/UL — SIGNIFICANT CHANGE UP (ref 0–0.7)
EOSINOPHIL NFR BLD AUTO: 4.1 % — SIGNIFICANT CHANGE UP (ref 0–8)
EPI CELLS # UR: 4 /HPF — SIGNIFICANT CHANGE UP (ref 0–5)
GLUCOSE SERPL-MCNC: 141 MG/DL — HIGH (ref 70–99)
GLUCOSE UR QL: NEGATIVE — SIGNIFICANT CHANGE UP
HCT VFR BLD CALC: 40.8 % — SIGNIFICANT CHANGE UP (ref 37–47)
HGB BLD-MCNC: 13.6 G/DL — SIGNIFICANT CHANGE UP (ref 12–16)
HYALINE CASTS # UR AUTO: 0 /LPF — SIGNIFICANT CHANGE UP (ref 0–7)
IMM GRANULOCYTES NFR BLD AUTO: 0.3 % — SIGNIFICANT CHANGE UP (ref 0.1–0.3)
KETONES UR-MCNC: SIGNIFICANT CHANGE UP
LEUKOCYTE ESTERASE UR-ACNC: ABNORMAL
LYMPHOCYTES # BLD AUTO: 1.35 K/UL — SIGNIFICANT CHANGE UP (ref 1.2–3.4)
LYMPHOCYTES # BLD AUTO: 18.5 % — LOW (ref 20.5–51.1)
MAGNESIUM SERPL-MCNC: 2 MG/DL — SIGNIFICANT CHANGE UP (ref 1.8–2.4)
MCHC RBC-ENTMCNC: 29.9 PG — SIGNIFICANT CHANGE UP (ref 27–31)
MCHC RBC-ENTMCNC: 33.3 G/DL — SIGNIFICANT CHANGE UP (ref 32–37)
MCV RBC AUTO: 89.7 FL — SIGNIFICANT CHANGE UP (ref 81–99)
MONOCYTES # BLD AUTO: 0.38 K/UL — SIGNIFICANT CHANGE UP (ref 0.1–0.6)
MONOCYTES NFR BLD AUTO: 5.2 % — SIGNIFICANT CHANGE UP (ref 1.7–9.3)
NEUTROPHILS # BLD AUTO: 5.18 K/UL — SIGNIFICANT CHANGE UP (ref 1.4–6.5)
NEUTROPHILS NFR BLD AUTO: 71.1 % — SIGNIFICANT CHANGE UP (ref 42.2–75.2)
NITRITE UR-MCNC: NEGATIVE — SIGNIFICANT CHANGE UP
NRBC # BLD: 0 /100 WBCS — SIGNIFICANT CHANGE UP (ref 0–0)
PH UR: 6.5 — SIGNIFICANT CHANGE UP (ref 5–8)
PLATELET # BLD AUTO: 188 K/UL — SIGNIFICANT CHANGE UP (ref 130–400)
POTASSIUM SERPL-MCNC: 3.4 MMOL/L — LOW (ref 3.5–5)
POTASSIUM SERPL-SCNC: 3.4 MMOL/L — LOW (ref 3.5–5)
PROT SERPL-MCNC: 7.2 G/DL — SIGNIFICANT CHANGE UP (ref 6–8)
PROT UR-MCNC: NEGATIVE — SIGNIFICANT CHANGE UP
RBC # BLD: 4.55 M/UL — SIGNIFICANT CHANGE UP (ref 4.2–5.4)
RBC # FLD: 12.7 % — SIGNIFICANT CHANGE UP (ref 11.5–14.5)
RBC CASTS # UR COMP ASSIST: 0 /HPF — SIGNIFICANT CHANGE UP (ref 0–4)
SODIUM SERPL-SCNC: 140 MMOL/L — SIGNIFICANT CHANGE UP (ref 135–146)
SP GR SPEC: 1.01 — SIGNIFICANT CHANGE UP (ref 1.01–1.03)
TROPONIN T SERPL-MCNC: <0.01 NG/ML — SIGNIFICANT CHANGE UP
UROBILINOGEN FLD QL: SIGNIFICANT CHANGE UP
WBC # BLD: 7.29 K/UL — SIGNIFICANT CHANGE UP (ref 4.8–10.8)
WBC # FLD AUTO: 7.29 K/UL — SIGNIFICANT CHANGE UP (ref 4.8–10.8)
WBC UR QL: 47 /HPF — HIGH (ref 0–5)

## 2022-04-19 PROCEDURE — 71045 X-RAY EXAM CHEST 1 VIEW: CPT | Mod: 26

## 2022-04-19 PROCEDURE — 93010 ELECTROCARDIOGRAM REPORT: CPT

## 2022-04-19 RX ORDER — SODIUM CHLORIDE 9 MG/ML
1000 INJECTION INTRAMUSCULAR; INTRAVENOUS; SUBCUTANEOUS ONCE
Refills: 0 | Status: COMPLETED | OUTPATIENT
Start: 2022-04-19 | End: 2022-04-19

## 2022-04-19 RX ORDER — CEFTRIAXONE 500 MG/1
1000 INJECTION, POWDER, FOR SOLUTION INTRAMUSCULAR; INTRAVENOUS ONCE
Refills: 0 | Status: COMPLETED | OUTPATIENT
Start: 2022-04-19 | End: 2022-04-19

## 2022-04-19 RX ORDER — CEFPODOXIME PROXETIL 100 MG
1 TABLET ORAL
Qty: 20 | Refills: 0
Start: 2022-04-19 | End: 2022-04-28

## 2022-04-19 RX ORDER — POTASSIUM CHLORIDE 20 MEQ
40 PACKET (EA) ORAL ONCE
Refills: 0 | Status: COMPLETED | OUTPATIENT
Start: 2022-04-19 | End: 2022-04-19

## 2022-04-19 RX ADMIN — Medication 40 MILLIEQUIVALENT(S): at 03:36

## 2022-04-19 RX ADMIN — SODIUM CHLORIDE 1000 MILLILITER(S): 9 INJECTION INTRAMUSCULAR; INTRAVENOUS; SUBCUTANEOUS at 00:53

## 2022-04-19 RX ADMIN — CEFTRIAXONE 100 MILLIGRAM(S): 500 INJECTION, POWDER, FOR SOLUTION INTRAMUSCULAR; INTRAVENOUS at 03:36

## 2022-04-19 NOTE — ED PROVIDER NOTE - CARE PROVIDER_API CALL
Agustín Ariza)  Internal Medicine  [address] [place] [address]  Phone: [phone]  Fax: [phone]  Follow Up Time:

## 2022-04-19 NOTE — ED PROVIDER NOTE - OBJECTIVE STATEMENT
89 year old F with hx of htn, ibs, uterine prolapse, L hip replacement presenting to er for evaluation. Sts tried to stand up from recliner at 10pm and her legs felt weak and wobbly and sts felt like she was going to fall. Pt also sts her head feels "funny." Sts ate dinner at 4pm and did not drink a lot of fluids after. Pt feels liker her mouth is dry and that she is dehydrated. Denies any falls/trauma, headache, dizziness, visual changes, confusion, speech changes, weakness, numbness, chest pain, sob, abd pain, n/v/d, urinary symptoms, fever/chills, recent illness.

## 2022-04-19 NOTE — ED PROVIDER NOTE - PROGRESS NOTE DETAILS
Pt seen at bedside, feels much better and ambulating well in ed with steady gait. discussed all results with pt and family. Will dc home with abx. Strict return precautions given.

## 2022-04-19 NOTE — ED PROVIDER NOTE - NS ED ROS FT
Constitutional: + weakness. no fever, chills, no recent weight loss, change in appetite or malaise  Eyes: no redness/discharge/pain/vision changes  ENT: no rhinorrhea/ear pain/sore throat  Cardiac: No chest pain, SOB or edema.  Respiratory: No cough or respiratory distress  GI: No nausea, vomiting, diarrhea or abdominal pain.  : No dysuria, frequency, urgency or hematuria  MS: no pain to back or extremities, no loss of ROM  Neuro: No headache, loc, numbness/parethesias  Skin: No skin rash.  Endocrine: No history of thyroid disease or diabetes.  Except as documented in the HPI, all other systems are negative.

## 2022-04-19 NOTE — ED PROVIDER NOTE - NS ED ATTENDING STATEMENT MOD
I have seen and examined this patient and fully participated in the care of this patient as the teaching attending.  The service was shared with the MICHOACANO.  I reviewed and verified the documentation and independently performed the documented:

## 2022-04-19 NOTE — ED PROVIDER NOTE - PHYSICAL EXAMINATION
CONSTITUTIONAL: Well-appearing; well-nourished; in no apparent distress.   EYES: PERRL; EOM intact.   ENT: + lips appear dry. normal nose; no rhinorrhea; normal pharynx with no tonsillar hypertrophy.   NECK: Supple; non-tender; no cervical lymphadenopathy.   CARDIOVASCULAR: Normal S1, S2; no murmurs, rubs, or gallops.   RESPIRATORY: Normal chest excursion with respiration; breath sounds clear and equal bilaterally; no wheezes, rhonchi, or rales.  GI/: Normal bowel sounds; non-distended; non-tender; no palpable organomegaly.   MS: No evidence of trauma or deformity. Normal ROM in all four extremities; non-tender to palpation; distal pulses are normal.   SKIN: Normal for age and race; warm; dry; good turgor; no apparent lesions or exudate.   NEURO/PSYCH: A & O x 4; grossly unremarkable. mood and manner are appropriate. No focal deficits. No facial droop. No tongue deviation. Cerebellar intact. normal gait. Sensation intact.

## 2022-04-19 NOTE — ED ADULT NURSE NOTE - OBJECTIVE STATEMENT
A&Ox4 female presents to ED after becoming dizzy after getting up, no syncope, no other complaints at this time

## 2022-04-19 NOTE — ED PROVIDER NOTE - CLINICAL SUMMARY MEDICAL DECISION MAKING FREE TEXT BOX
.    89 y/o F pmh as noted, p/w weakness "wobbly" like sh may fall. endorses did not drink much fluids today.  NO ha, cp, sob. No focal neuro deficits. EXAM above, dry MM, pt appears tired, Non focal neuro exam.  UA + UTI. EKG NL sinus. Labs wnl.  Pt felt much better after ivf. Walking in ED w/o distress.  Pt got abx.  IMP;: UTI.  Discussed all available results with Pt.  Pt understands results, plan of care, outpt follow up as discussed, and signs and symptoms for ED return.  Pt is comfortable with discharge. DC home.     .

## 2022-04-20 LAB
CULTURE RESULTS: SIGNIFICANT CHANGE UP
SPECIMEN SOURCE: SIGNIFICANT CHANGE UP

## 2022-04-21 ENCOUNTER — OUTPATIENT (OUTPATIENT)
Dept: OUTPATIENT SERVICES | Facility: HOSPITAL | Age: 87
LOS: 1 days | Discharge: HOME | End: 2022-04-21
Payer: MEDICARE

## 2022-04-21 DIAGNOSIS — Z90.710 ACQUIRED ABSENCE OF BOTH CERVIX AND UTERUS: Chronic | ICD-10-CM

## 2022-04-21 DIAGNOSIS — Z98.890 OTHER SPECIFIED POSTPROCEDURAL STATES: Chronic | ICD-10-CM

## 2022-04-21 DIAGNOSIS — Z96.642 PRESENCE OF LEFT ARTIFICIAL HIP JOINT: Chronic | ICD-10-CM

## 2022-04-21 PROCEDURE — 77067 SCR MAMMO BI INCL CAD: CPT | Mod: 26

## 2022-04-21 PROCEDURE — 77063 BREAST TOMOSYNTHESIS BI: CPT | Mod: 26

## 2022-04-26 DIAGNOSIS — Z12.31 ENCOUNTER FOR SCREENING MAMMOGRAM FOR MALIGNANT NEOPLASM OF BREAST: ICD-10-CM

## 2022-05-02 ENCOUNTER — APPOINTMENT (OUTPATIENT)
Dept: UROGYNECOLOGY | Facility: CLINIC | Age: 87
End: 2022-05-02
Payer: MEDICARE

## 2022-05-02 VITALS
WEIGHT: 130 LBS | DIASTOLIC BLOOD PRESSURE: 79 MMHG | HEIGHT: 58 IN | BODY MASS INDEX: 27.29 KG/M2 | HEART RATE: 76 BPM | SYSTOLIC BLOOD PRESSURE: 167 MMHG

## 2022-05-02 PROCEDURE — 99213 OFFICE O/P EST LOW 20 MIN: CPT

## 2022-05-02 NOTE — HISTORY OF PRESENT ILLNESS
[FreeTextEntry1] : Patient is here for routine pessary cleaning for rectocele. \par Last seen 1/31/22 for pessary cleaning. ring and support # 4 \par \par s/p SAGRARIO/BS0/appy (fibroids)\par no Glaucoma\par \par Denies stress incontinence\par Not sexually active\par No history of incomplete bladder emptying without reduction. \par \par 11/7/20: urine cx: negative\par Pt treated in the ER for UTI and had curran placed due to difficulty urinating. Curran removed on 11/16/2020 in the office.\par \par Fitted\par Ring and support #4 for rectocele. But did not want it placed previously due to diarrhea from IBS.\par \par Today, patient is doing well and has no complaints. She was in the ER on 4/19/22 for weakness walking, and was treated for UTI. Upon review of records, urine culture: normal georgina. Was advised to follow up with PCP, did not do that yet. \par

## 2022-05-02 NOTE — DISCUSSION/SUMMARY
[FreeTextEntry1] : Rectocele\par Ring and support # 4 removed, cleaned, inspected and reinserted. The patient tolerated this well. \par No bleeding, lesions, abnormal discharge were noted. \par The patient will follow up in 3 months for routine pessary management.\par \par Atrophic Vaginitis:\par Advised to continue to apply a pea size amount of the cream to the opening of the vagina three nights per week.\par \par Advised to follow up with PCP for weakness evaluation that occurred on 4/19/22.

## 2022-05-02 NOTE — COUNSELING
[FreeTextEntry1] : Please call the office if you have any issues with vaginal pain, vaginal bleeding, difficulty urinating or having a bowel movement or if the pessary falls out so that we can arrange another size pessary.\par \par Please continue to apply a pea size amount of the cream to the opening of the vagina three nights per week.\par \par Please follow up with your PCP.\par \par Please follow up for pessary maintenance in 3 months with SHA Brown\par  Metabolic acidosis

## 2022-07-07 NOTE — ED ADULT TRIAGE NOTE - PRO INTERPRETER NEED 2
Large Joint Aspiration/Injection: R subacromial bursa    Date/Time: 7/7/2022 2:00 PM  Performed by: Keven Piedra MD  Authorized by: Keven Piedra MD     Consent Done?:  Yes (Verbal)  Site marked: the procedure site was marked    Prep: patient was prepped and draped in usual sterile fashion      Details:  Needle Size:  21 G  Approach:  Posterior  Location:  Shoulder  Site:  R subacromial bursa  Medications:  80 mg triamcinolone acetonide 40 mg/mL  Patient tolerance:  Patient tolerated the procedure well with no immediate complications      
English

## 2022-08-01 ENCOUNTER — APPOINTMENT (OUTPATIENT)
Dept: UROGYNECOLOGY | Facility: CLINIC | Age: 87
End: 2022-08-01

## 2022-08-01 VITALS
HEART RATE: 74 BPM | WEIGHT: 126 LBS | HEIGHT: 58 IN | BODY MASS INDEX: 26.45 KG/M2 | DIASTOLIC BLOOD PRESSURE: 76 MMHG | SYSTOLIC BLOOD PRESSURE: 171 MMHG

## 2022-08-01 DIAGNOSIS — Z87.898 PERSONAL HISTORY OF OTHER SPECIFIED CONDITIONS: ICD-10-CM

## 2022-08-01 DIAGNOSIS — Z87.19 PERSONAL HISTORY OF OTHER DISEASES OF THE DIGESTIVE SYSTEM: ICD-10-CM

## 2022-08-01 PROCEDURE — 99214 OFFICE O/P EST MOD 30 MIN: CPT

## 2022-08-01 NOTE — HISTORY OF PRESENT ILLNESS
[FreeTextEntry1] : Patient is here for routine pessary cleaning for rectocele.\par Last seen 5/2/22 for pessary cleaning. ring and support # 4 \par \par s/p SAGRARIO/BS0/appy (fibroids)\par no Glaucoma\par \par Denies stress incontinence\par Not sexually active\par No history of incomplete bladder emptying without reduction. \par \par 11/7/20: urine cx: negative\par Pt treated in the ER for UTI and had curran placed due to difficulty urinating. Curran removed on 11/16/2020 in the office.\par \par Fitted\par Ring and support #4 for rectocele. But did not want it placed previously due to diarrhea from IBS.\par \par Estrace cream\par \par Today, patient is doing well and has no complaints. No issues with the pessary, loves it. Uses estrace cream 3x a week. \par

## 2022-09-16 NOTE — ED ADULT NURSE NOTE - PAIN RATING/NUMBER SCALE (0-10): ACTIVITY
[Care Plan reviewed and provided to patient/caregiver] : Care plan reviewed and provided to patient/caregiver 0

## 2022-10-01 NOTE — PATIENT PROFILE ADULT. - CHOOSE INDICATION TO IMMUNIZE (AN ORDER WILL BE GENERATED WHEN THIS NOTE IS SAVED):
EXAM DESCRIPTION:  CT - Head angio - 10/1/2022 2:01 am

 

CLINICAL HISTORY:  The patient is 15 years old and is Female; Headache, sudden, severe

 

TECHNIQUE:  Axial computed tomographic angiography images of the head and neck with intravenous contr
ast.   This CT exam was performed using one or more of the following dose reduction techniques:   aut
omated exposure control, adjustment of the mA and/or kV according to patient size, and/or use of iter
ative reconstruction technique.

MIP reconstructed images were created and reviewed.

DLP: 1332 mGy*cm

 

COMPARISON:  CT head without contrast of the same day.

 

FINDINGS:  HEAD:

RIGHT ANTERIOR CEREBRAL ARTERY:   Unremarkable.   No occlusion or significant stenosis.   No aneurysm
.

RIGHT MIDDLE CEREBRAL ARTERY:   Unremarkable.   No occlusion or significant stenosis.   No aneurysm.

RIGHT POSTERIOR CEREBRAL ARTERY:   Fetal origin of the right PCA.

        No occlusion or significant stenosis.   No aneurysm.

RIGHT INTRACRANIAL INTERNAL CAROTID ARTERY:   Unremarkable.   No significant stenosis.   No dissectio
n or occlusion.

RIGHT INTRACRANIAL VERTEBRAL ARTERY:   Unremarkable.   No significant stenosis.   No dissection or oc
clusion.

LEFT ANTERIOR CEREBRAL ARTERY:   Unremarkable.   No occlusion or significant stenosis.   No aneurysm.


LEFT MIDDLE CEREBRAL ARTERY:   Unremarkable.   No occlusion or significant stenosis.   No aneurysm.

LEFT POSTERIOR CEREBRAL ARTERY:   Unremarkable.   No occlusion or significant stenosis.   No aneurysm
.

LEFT INTRACRANIAL INTERNAL CAROTID ARTERY:   Unremarkable.   No significant stenosis.   No dissection
 or occlusion.

LEFT INTRACRANIAL VERTEBRAL ARTERY:   Unremarkable.   No significant stenosis.   No dissection or occ
lusion.

BASILAR ARTERY:   Unremarkable.   No occlusion or significant stenosis.   No aneurysm.

NECK:

RIGHT COMMON CAROTID ARTERY:   Unremarkable.   No significant stenosis.   No dissection or occlusion.


RIGHT EXTRACRANIAL INTERNAL CAROTID ARTERY:   Unremarkable.   No significant stenosis.   No dissectio
n or occlusion.

RIGHT EXTERNAL CAROTID ARTERY:   Unremarkable.   No occlusion.

RIGHT EXTRACRANIAL VERTEBRAL ARTERY:   Unremarkable.   No significant stenosis.   No dissection or oc
clusion.

LEFT COMMON CAROTID ARTERY:   Unremarkable.   No significant stenosis.   No dissection or occlusion.

LEFT EXTRACRANIAL INTERNAL CAROTID ARTERY:   Unremarkable.   No significant stenosis.   No dissection
 or occlusion.

LEFT EXTERNAL CAROTID ARTERY:   Unremarkable.   No occlusion.

LEFT EXTRACRANIAL VERTEBRAL ARTERY:   Unremarkable.   No significant stenosis.   No dissection or occ
lusion.

LUNG APICES:   Visualized lung zones are clear.

MEDIASTINUM:   Soft tissue density in the anterior mediastinal, likely thymic remnant.

HEAD and NECK:

BONES/JOINTS:   No acute fracture.   No dislocation.

SOFT TISSUES:   Unremarkable as visualized.   No mass.

CAROTID STENOSIS REFERENCE USING NASCET CRITERIA:

% ICA stenosis = (1 - narrowest ICA diameter/diameter of distal cervical ICA) x 100.

Mild - <50% stenosis.

Moderate - 50-69% stenosis.

Severe - 70-94% stenosis.

Near occlusion - 95-99% stenosis.

Occluded - 100% stenosis.

 

IMPRESSION:  1.   No intracranial large vessel occlusion.

2.   No cervical flow-limiting stenosis.

 

Electronically signed by:   Osman Gee DO   10/1/2022 2:16 AM CDT Workstation: [id number]

 

 

 

Due to temporary technical issues with the PACS/Fluency reporting system, reports are being signed by
 the in house radiologists without review as a courtesy to insure prompt reporting. The interpreting 
radiologist is fully responsible for the content of the report. Patient is 18 years or older (and not pregnant)

## 2022-10-14 ENCOUNTER — OUTPATIENT (OUTPATIENT)
Dept: OUTPATIENT SERVICES | Facility: HOSPITAL | Age: 87
LOS: 1 days | Discharge: HOME | End: 2022-10-14

## 2022-10-14 DIAGNOSIS — Z90.710 ACQUIRED ABSENCE OF BOTH CERVIX AND UTERUS: Chronic | ICD-10-CM

## 2022-10-14 DIAGNOSIS — Z96.642 PRESENCE OF LEFT ARTIFICIAL HIP JOINT: Chronic | ICD-10-CM

## 2022-10-14 DIAGNOSIS — Z00.00 ENCOUNTER FOR GENERAL ADULT MEDICAL EXAMINATION WITHOUT ABNORMAL FINDINGS: ICD-10-CM

## 2022-10-14 DIAGNOSIS — Z98.890 OTHER SPECIFIED POSTPROCEDURAL STATES: Chronic | ICD-10-CM

## 2022-10-14 DIAGNOSIS — R94.31 ABNORMAL ELECTROCARDIOGRAM [ECG] [EKG]: ICD-10-CM

## 2022-10-14 PROCEDURE — 93306 TTE W/DOPPLER COMPLETE: CPT | Mod: 26

## 2022-10-26 ENCOUNTER — OUTPATIENT (OUTPATIENT)
Dept: OUTPATIENT SERVICES | Facility: HOSPITAL | Age: 87
LOS: 1 days | Discharge: HOME | End: 2022-10-26

## 2022-10-26 DIAGNOSIS — Z96.642 PRESENCE OF LEFT ARTIFICIAL HIP JOINT: Chronic | ICD-10-CM

## 2022-10-26 DIAGNOSIS — Z98.890 OTHER SPECIFIED POSTPROCEDURAL STATES: Chronic | ICD-10-CM

## 2022-10-26 DIAGNOSIS — R22.0 LOCALIZED SWELLING, MASS AND LUMP, HEAD: ICD-10-CM

## 2022-10-26 DIAGNOSIS — Z90.710 ACQUIRED ABSENCE OF BOTH CERVIX AND UTERUS: Chronic | ICD-10-CM

## 2022-10-26 PROCEDURE — 93880 EXTRACRANIAL BILAT STUDY: CPT | Mod: 26,59

## 2022-10-26 PROCEDURE — 76536 US EXAM OF HEAD AND NECK: CPT | Mod: 26

## 2022-11-03 ENCOUNTER — APPOINTMENT (OUTPATIENT)
Dept: UROGYNECOLOGY | Facility: CLINIC | Age: 87
End: 2022-11-03

## 2022-11-03 VITALS
DIASTOLIC BLOOD PRESSURE: 76 MMHG | WEIGHT: 126 LBS | HEART RATE: 68 BPM | HEIGHT: 58 IN | SYSTOLIC BLOOD PRESSURE: 148 MMHG | BODY MASS INDEX: 26.45 KG/M2

## 2022-11-03 PROCEDURE — 99214 OFFICE O/P EST MOD 30 MIN: CPT

## 2022-11-03 RX ORDER — ATENOLOL 25 MG/1
25 TABLET ORAL
Refills: 0 | Status: COMPLETED | COMMUNITY
End: 2022-11-03

## 2022-11-03 NOTE — DISCUSSION/SUMMARY
[FreeTextEntry1] : \par Rectocele:\par Ring and support #4 removed, cleaned, inspected and reinserted. The patient tolerated this well. \par Bleeding noted. Hemostasis obtained with monsels.\par The patient will follow up in 3 months for routine pessary management.\par \par Atrophic Vaginitis:\par Advised to continue to apply a pea size amount of the cream to the opening of the vagina three nights per week.\par \par

## 2022-11-03 NOTE — HISTORY OF PRESENT ILLNESS
[FreeTextEntry1] : Patient is here for routine pessary cleaning for rectocele. \par Last seen 8/1/2022 for pessary cleaning. Ring and support # 4\par \par s/p SAGRARIO/BS0/appy (fibroids)\par no Glaucoma\par \par Denies stress incontinence\par Not sexually active\par No history of incomplete bladder emptying without reduction. \par \par 11/7/20: urine cx: negative\par Pt treated in the ER for UTI and had curran placed due to difficulty urinating. Curran removed on 11/16/2020 in the office.\par \par Fitted\par Ring and support #4 for rectocele. But did not want it placed previously due to diarrhea from IBS.\par \par Estrace cream\par \par Today, patient is doing well and has no complaints. Patient is here with her daughter. Patient has been noticing some tan colored discharge. Denies bleeding. \par

## 2022-11-03 NOTE — COUNSELING
[FreeTextEntry1] : \par Please call the office if you have any issues with vaginal pain, vaginal bleeding, difficulty urinating or having a bowel movement or if the pessary falls out so that we can arrange another size pessary.\par \par Please continue to apply a pea size amount of the cream to the opening of the vagina three nights per week.\par \par Please follow up for pessary maintenance in 3 months with SHA Brown or SHA Monge\par

## 2022-12-28 NOTE — CONSULT NOTE ADULT - ASSESSMENT
pregnancy problem
HTN: elevated, partly attributed to the stress of surgery and pain, if remains high increase atenolol to 50 mg  high lipid: on atorvastatin  pre op cardiac risk assessment    moderate risk patient (age) and surgery (orthopedics surgery), functional capacity at limited side, less than 4 METS, generally by accepting a moderate risk she can have the surgery done, she is already on beta blocker and statin  if bp remains high or HR stays above 90 then increase Atenolol to 50 mg daily  post op care as per the events
IMPRESSION: Rehab of left hip femoral neck basicervical fx, s/p hemiarthroplasty    PRECAUTIONS: [  ] Cardiac  [  ] Respiratory  [  ] Seizures [  ] Contact Isolation  [  ] Droplet Isolation  [  ] Other    Weight Bearing Status: wbat    RECOMMENDATION:    Out of Bed to Chair     DVT/Decubiti Prophylaxis    REHAB PLAN:     [ x  ] Bedside P/T 3-5 times a week   [  x ]   Bedside O/T  2-3 times a week             [   ] No Rehab Therapy Indicated                   [   ]  Speech Therapy   Conditioning/ROM                                    ADL  Bed Mobility                                               Conditioning/ROM  Transfers                                                     Bed Mobility  Sitting /Standing Balance                         Transfers                                        Gait Training                                               Sitting/Standing Balance  Stair Training [   ]Applicable                    Home equipment Eval                                                                        Splinting  [   ] Only      GOALS:   ADL   [ x  ]   Independent                    Transfers  [ x  ] Independent                          Ambulation  [ x  ] Independent     [ x   ] With device                            [   ]  CG                                                         [   ]  CG                                                                  [   ] CG                            [    ] Min A                                                   [   ] Min A                                                              [   ] Min  A          DISCHARGE PLAN:   [x   ]  Good candidate for Intensive Rehabilitation/Hospital based-[facility]A [facility]                                             Will tolerate 3hrs Intensive Rehab Daily                                       [    ]  Short Term Rehab in Skilled Nursing Facility                                       [    ]  Home with Outpatient or  services                                         [    ]  Possible Candidate for Intensive Hospital based Rehab

## 2023-02-07 ENCOUNTER — APPOINTMENT (OUTPATIENT)
Dept: UROGYNECOLOGY | Facility: CLINIC | Age: 88
End: 2023-02-07
Payer: MEDICARE

## 2023-02-07 VITALS
WEIGHT: 125 LBS | SYSTOLIC BLOOD PRESSURE: 136 MMHG | HEIGHT: 58 IN | HEART RATE: 85 BPM | DIASTOLIC BLOOD PRESSURE: 78 MMHG | BODY MASS INDEX: 26.24 KG/M2

## 2023-02-07 PROCEDURE — 99214 OFFICE O/P EST MOD 30 MIN: CPT

## 2023-02-07 NOTE — COUNSELING
[FreeTextEntry1] : \par Please call the office if you have any issues with vaginal pain, vaginal bleeding, difficulty urinating or having a bowel movement or if the pessary falls out so that we can arrange another size pessary.\par \par Please continue to apply a pea size amount of the cream to the opening of the vagina three nights per week.\par \par Please follow up for pessary maintenance in 3 months with SHA Monge \par

## 2023-02-07 NOTE — DISCUSSION/SUMMARY
[FreeTextEntry1] : \par Rectocele:\par Ring and support #4 removed, cleaned, inspected and reinserted. The patient tolerated this well. \par Bleeding noted. Hemostasis obtained with monsels\par The patient will follow up in 3 months for routine pessary management.\par Discussed trying a pessary holiday, patient and her daughter will consider it possibly at the next visit. \par \par Atrophic Vaginitis:\par Advised to continue to apply a pea size amount of the cream to the opening of the vagina three nights per week. Refill sent\par

## 2023-02-07 NOTE — HISTORY OF PRESENT ILLNESS
[FreeTextEntry1] : Patient is here for routine pessary cleaning for rectocele\par Last seen 11/3/2022 for pessary cleaning. Ring and support # 4\par \par s/p SAGRARIO/BS0/appy (fibroids)\par no Glaucoma\par \par Denies stress incontinence\par Not sexually active\par No history of incomplete bladder emptying without reduction. \par \par 11/7/20: urine cx: negative\par Pt treated in the ER for UTI and had curran placed due to difficulty urinating. Curran removed on 11/16/2020 in the office.\par \par Fitted\par Ring and support #4 for rectocele\par \par Estrace cream\par \par Patient is here with her daughter. \par \par Today, patient is doing well and has no complaints. Notes occasional discharge, no odor, not bothersome. She finds pessary maintenance uncomfortable but is happy with the pessary overall. Denies bleeding. Needs a refill of the estrace cream. \par \par

## 2023-05-08 ENCOUNTER — APPOINTMENT (OUTPATIENT)
Dept: UROGYNECOLOGY | Facility: CLINIC | Age: 88
End: 2023-05-08
Payer: MEDICARE

## 2023-05-08 VITALS
HEART RATE: 69 BPM | WEIGHT: 125 LBS | DIASTOLIC BLOOD PRESSURE: 75 MMHG | SYSTOLIC BLOOD PRESSURE: 138 MMHG | HEIGHT: 58 IN | BODY MASS INDEX: 26.24 KG/M2

## 2023-05-08 PROCEDURE — 99214 OFFICE O/P EST MOD 30 MIN: CPT

## 2023-05-08 NOTE — HISTORY OF PRESENT ILLNESS
[FreeTextEntry1] : Patient is here for routine pessary cleaning for rectocele\par Last seen 2/7/2023 for pessary cleaning. Ring and support #4 \par \par s/p SAGRARIO/BS0/appy (fibroids)\par no Glaucoma\par \par Denies stress incontinence\par Not sexually active\par No history of incomplete bladder emptying without reduction. \par \par 11/7/20: urine cx: negative\par Pt treated in the ER for UTI and had curran placed due to difficulty urinating. Curran removed on 11/16/2020 in the office.\par \par Fitted\par Ring and support #4 for rectocele\par \par Estrace cream\par \par Patient is here with her daughter. \par \par Today, patient is doing well and has no complaints. Denies vaginal bleeding. Notes occasional light tan/yellow discharge, no odor, not bothersome. \par

## 2023-05-08 NOTE — COUNSELING
[FreeTextEntry1] : Please call the office if you have any issues with vaginal pain, vaginal bleeding, difficulty urinating or having a bowel movement or if the pessary falls out so that we can arrange another size pessary.\par \par Please continue to apply a pea size amount of the cream to the opening of the vagina three nights per week.\par \par Please follow up for pessary maintenance in 3 months with SHA Monge or SHA Brown\par

## 2023-05-08 NOTE — DISCUSSION/SUMMARY
[FreeTextEntry1] : \par Rectocele:\par Ring and support #4 removed, cleaned, inspected and reinserted. The patient tolerated this well. \par No bleeding, lesions, abnormal discharge were noted. \par The patient will follow up in 3 months for routine pessary management.\par \par Atrophic Vaginitis:\par Advised to continue to apply a pea size amount of the cream to the opening of the vagina three nights per week.\par

## 2023-08-01 NOTE — ED ADULT NURSE NOTE - NSINTERVENTIONOPT_GEN_ALL_ED
You were seen today for Chronic Kidney Disease Stage 3      Medication change:   Start with magnesium oxide 400 mg twice daily    Blood tests ordered:   Labs in 3 months renal panel, magnesium, UA, UPCR, microalbumin     Referrals made:  Please call us if youre ever hospitalized - I would like to do a 24 hr urine collection while youre in the hospital    Other orders:  Check your blood pressure weekly at home and keep a log (once 30 minutes after taking your medications in the morning and once 30 minutes before bedtime).  Let us know if you are persistently >140, <110 and/or lightheaded/dizzy.    Dietary recommendations:   Fluid intake of  oz per day.  Low sodium diet less than 2 grams per day.      Follow up in 6 months, (September, 2023), with non fasting blood/urine labs one week prior to the appointment. We will go over the results at your follow up appointment. -- Renal panel, Magnesium level, CBC with differential, Urinalysis, UPCR, Microalbumin urine, iPTH, Vitamin D -25 Hydroxy    We will contact you by phone or mail when we receive the schedule.    Remember to attempt to stay away from Ibuprofen, Aleve, and Advil. Tylenol/Acetaminophen based products are best.    If any issues or questions should happen before your next appointment, do not hesitate to call. Our office number is [phone].   
Hourly Rounding/Enhanced Supervision/Stretcher Alarms

## 2023-08-07 ENCOUNTER — APPOINTMENT (OUTPATIENT)
Dept: UROGYNECOLOGY | Facility: CLINIC | Age: 88
End: 2023-08-07
Payer: MEDICARE

## 2023-08-07 VITALS
BODY MASS INDEX: 26.03 KG/M2 | HEIGHT: 58 IN | SYSTOLIC BLOOD PRESSURE: 152 MMHG | HEART RATE: 61 BPM | DIASTOLIC BLOOD PRESSURE: 69 MMHG | WEIGHT: 124 LBS

## 2023-08-07 PROCEDURE — 99214 OFFICE O/P EST MOD 30 MIN: CPT

## 2023-08-07 RX ORDER — AMLODIPINE BESYLATE 5 MG/1
5 TABLET ORAL
Refills: 0 | Status: ACTIVE | COMMUNITY

## 2023-08-07 NOTE — COUNSELING
[FreeTextEntry1] : Please call the office if you have any issues with vaginal pain, vaginal bleeding, difficulty urinating or having a bowel movement or if the pessary falls out so that we can arrange another size pessary.  Please continue to apply a pea size amount of the cream to the opening of the vagina three nights per week.  Please follow up for pessary maintenance in 3 months with SHA Monge or SHA Brown

## 2023-08-07 NOTE — DISCUSSION/SUMMARY
[FreeTextEntry1] :  Rectocele: Ring and support #4 removed, cleaned, inspected and reinserted. The patient tolerated this well.  Bleeding noted. Hemostasis obtained with monsels x 2. The patient will follow up in 3 months for routine pessary management.  Atrophic Vaginitis: Advised to continue to apply a pea size amount of the cream to the opening of the vagina three nights per week.

## 2023-08-07 NOTE — HISTORY OF PRESENT ILLNESS
[FreeTextEntry1] : Patient is here for routine pessary cleaning for rectocele.  Last seen 5/8//2023 for pessary cleaning. Ring and support #4  s/p SAGRARIO/BS0/appy (fibroids) no Glaucoma Denies stress incontinence.   Not sexually active No history of incomplete bladder emptying without reduction. 11/7/20: urine cx: negative Pt treated in the ER for UTI and had curran placed due to difficulty urinating. Curran removed on 11/16/2020 in the office.  Fitted: Ring and support #4 for rectocele  Estrace cream  Patient is here with her daughter. Today, patient is doing well and has no complaints. Denies vaginal bleeding. Notes occasional light tan/yellow discharge, no odor, not bothersome.

## 2023-08-07 NOTE — REASON FOR VISIT
[TextEntry] : Reason for visit: 3 month pessary check Voids per day:   4-5 Voids per night:  3  Urge incontinence: No   Stress incontinence: No    Constipation: Occasional, uses Miralax prn    Fecal incontinence: No   Vaginal bulge:  No, with pessary in place

## 2023-08-07 NOTE — END OF VISIT
[Time Spent: ___ minutes] : I have spent [unfilled] minutes of time on the encounter. [>50% of the face to face encounter time was spent on counseling and/or coordination of care for ___] : Greater than 50% of the face to face encounter time was spent on counseling and/or coordination of care for [unfilled] Parent(s)

## 2023-11-22 ENCOUNTER — APPOINTMENT (OUTPATIENT)
Dept: UROGYNECOLOGY | Facility: CLINIC | Age: 88
End: 2023-11-22
Payer: MEDICARE

## 2023-11-22 VITALS
HEART RATE: 71 BPM | BODY MASS INDEX: 26.03 KG/M2 | DIASTOLIC BLOOD PRESSURE: 71 MMHG | SYSTOLIC BLOOD PRESSURE: 109 MMHG | WEIGHT: 124 LBS | HEIGHT: 58 IN

## 2023-11-22 DIAGNOSIS — N95.2 POSTMENOPAUSAL ATROPHIC VAGINITIS: ICD-10-CM

## 2023-11-22 PROCEDURE — 57160 INSERT PESSARY/OTHER DEVICE: CPT

## 2023-11-22 PROCEDURE — A4562: CPT

## 2023-11-22 PROCEDURE — 99214 OFFICE O/P EST MOD 30 MIN: CPT | Mod: 25

## 2023-11-22 RX ORDER — ATENOLOL 50 MG/1
50 TABLET ORAL
Refills: 0 | Status: ACTIVE | COMMUNITY

## 2023-11-30 ENCOUNTER — OUTPATIENT (OUTPATIENT)
Dept: OUTPATIENT SERVICES | Facility: HOSPITAL | Age: 88
LOS: 1 days | End: 2023-11-30
Payer: MEDICARE

## 2023-11-30 DIAGNOSIS — Z98.890 OTHER SPECIFIED POSTPROCEDURAL STATES: Chronic | ICD-10-CM

## 2023-11-30 DIAGNOSIS — Z96.642 PRESENCE OF LEFT ARTIFICIAL HIP JOINT: Chronic | ICD-10-CM

## 2023-11-30 DIAGNOSIS — Z12.31 ENCOUNTER FOR SCREENING MAMMOGRAM FOR MALIGNANT NEOPLASM OF BREAST: ICD-10-CM

## 2023-11-30 DIAGNOSIS — Z90.710 ACQUIRED ABSENCE OF BOTH CERVIX AND UTERUS: Chronic | ICD-10-CM

## 2023-11-30 PROCEDURE — 77080 DXA BONE DENSITY AXIAL: CPT

## 2023-12-01 DIAGNOSIS — Z12.31 ENCOUNTER FOR SCREENING MAMMOGRAM FOR MALIGNANT NEOPLASM OF BREAST: ICD-10-CM

## 2023-12-07 NOTE — ED ADULT TRIAGE NOTE - MEANS OF ARRIVAL
[facility]   Lilo De Anda MD    Patient Name: Jarett Reese  Patient YOB: 1951   Date of Service: 12/7/23    CC: Full Skin Exam    HPI: Jarett Reese is a 72 y.o. male presents today for a full skin exam.  Patient was last seen 6/7/23 and dermatologic history includes AK and NMSC. Patient is concerned today about a lesion located on the right knee.  It has been present for 2 month(s). It has not been treated in the past.  Patient is also concerned about lesion located on the left index finger and left eyelid.    Past Medical History:   Diagnosis Date    Allergy     Arthritis     Asthma     Cancer     Follicular lymphoma     GERD (gastroesophageal reflux disease)     Hypertension     Lymphoma, follicular 05/27/2012    Recurrent sinusitis      Past Surgical History:   Procedure Laterality Date    ADENOIDECTOMY      left ear      septoplasmy       Review of patient's allergies indicates:   Allergen Reactions    Rituximab Other (See Comments) and Shortness Of Breath     chills    Tramadol Shortness Of Breath and Other (See Comments)     Other Reaction(s): Unknown    Asthma/Respiratory distress    Note: - [employer] 07/18/2018    Ace inhibitors     Grass pollen      Note: - [employer] 07/18/2018    Tramadol hcl        Current Outpatient Medications:     ascorbic acid, vitamin C, (VITAMIN C) 500 MG tablet, Take 500 mg by mouth once daily., Disp: , Rfl:     aspirin (ECOTRIN) 81 MG EC tablet, Take 81 mg by mouth once daily., Disp: , Rfl:     azelastine (ASTELIN) 137 mcg (0.1 %) nasal spray, 1 spray by Nasal route 2 (two) times daily., Disp: , Rfl:     chlorhexidine (PERIDEX) 0.12 % solution, , Disp: , Rfl:     ciclopirox (PENLAC) 8 % Soln, Apply topically nightly., Disp: 6.6 mL, Rfl: 11    ciclopirox (PENLAC) 8 % Soln, Apply topically nightly., Disp: 6.6 mL, Rfl: 11    ciclopirox 0.77 % Gel, Apply topically 2 (two) times daily., Disp: 100 g, Rfl: 3    esomeprazole (NEXIUM) 40 MG capsule, Take 40  mg by mouth before breakfast., Disp: , Rfl:     furosemide (LASIX) 20 MG tablet, , Disp: , Rfl:     gentamicin (GARAMYCIN) 0.1 % ointment, Apply topically once daily., Disp: 30 g, Rfl: 2    ipratropium (ATROVENT) 0.03 % nasal spray, USE 2 SPRAYS NASALLY AS NEEDED EVERY 6 HOURS FOR DRAINAGE (2 SPRAYS NASALLY FOUR TIMES A DAY), Disp: 30 mL, Rfl: 6    ketoconazole (NIZORAL) 2 % shampoo, Apply topically twice a week. Use as a scalp treatment 2-3 times a week for maintenance, massaging into scalp and leaving on for up to 3 minutes before rinsing., Disp: 120 mL, Rfl: 11    ketoconazole (NIZORAL) 2 % shampoo, Use as a scalp treatment 2-3 times a week for maintenance, massaging into scalp and leaving on for up to 3 minutes before rinsing, Disp: 120 mL, Rfl: 5    LORazepam (ATIVAN) 1 MG tablet, TAKE 1-2 TABLET AS NEEDED 15 MINUTES PRIOR TO PROCEDURE FOR ANXIETY ORALLY 30 DAYS, Disp: , Rfl: 0    mometasone (NASONEX) 50 mcg/actuation nasal spray, 2 sprays by Nasal route 2 (two) times daily., Disp: 2 each, Rfl: 6    montelukast (SINGULAIR) 10 mg tablet, TAKE 1 TABLET BY MOUTH EVERY DAY, Disp: 30 tablet, Rfl: 2    NON FORMULARY MEDICATION, Apply topically 2 (two) times daily as needed (apply to back prn pain)., Disp: , Rfl:     potassium chloride SA (K-DUR,KLOR-CON) 20 MEQ tablet, , Disp: , Rfl:     urea (CARMOL) 40 % Crea, Apply to AA on right knee and left index finger daily., Disp: 198 g, Rfl: 2    valsartan (DIOVAN) 80 MG tablet, , Disp: , Rfl:     verapamil (CALAN-SR) 240 MG CR tablet, Take 240 mg by mouth once daily., Disp: , Rfl:     vitamin D 1000 units Tab, Take 1,000 Units by mouth once daily., Disp: , Rfl:     ROS: A focused review of systems was obtained and negative.     Exam: A full skin exam was performed including scalp, hair, face, neck, chest, back, abdomen, right arm, left arm, right hand, left hand, nails, right leg, and left leg.  All areas examined were normal expect as per below in assessment and  plan.  General Appearance of the patient is well developed and well nourished.  Orientation: alert and oriented x 3.  Mood and affect: pleasant.    Assessment:   The primary encounter diagnosis was Other skin changes due to chronic exposure to nonionizing radiation. Diagnoses of Seborrheic keratoses, History of basal cell carcinoma of skin, Milia of eyelid of left eye, Actinic keratoses, Callus, and Seborrheic dermatitis were also pertinent to this visit.    Plan:   Medications Ordered This Encounter   Medications    ketoconazole (NIZORAL) 2 % shampoo     Sig: Use as a scalp treatment 2-3 times a week for maintenance, massaging into scalp and leaving on for up to 3 minutes before rinsing     Dispense:  120 mL     Refill:  5    urea (CARMOL) 40 % Crea     Sig: Apply to AA on right knee and left index finger daily.     Dispense:  198 g     Refill:  2     Seborrheic Keratosis (L82.1)  - Stuck-on, warty, greasy brown papule with pseudo-horn cysts scattered on the trunk and extremities    Plan: Counseling.  I counseled the patient regarding the following:  Skin Care: Seborrheic Keratoses are benign. No treatment is necessary.  Expectations: Seborrheic Keratoses are benign warty growths. Patients get more of them as they age    Plan: Reassurance    Milia   - yellow-white cystic papules located on the left eyelid.    Plan: Counseling  I counseled the patient regarding the following:  Skin Care: Milia can resolve with retinoids or extraction.  Expectations: Milia are benign superficial cysts filled with keratin. No treatment is necessary.    Plan: Extractions  Comedonal Lesions located on the above location were removed with an 11 blade and q-tip. Prior to removal the treatment areas were prepped in the usual fashion. Consent was obtained and risks were reviewed including but not limited to scarring, infection, bleeding, scabbing, incomplete removal, and allergy to anesthesia.     Actinic Keratoses(L57.0)  - Erythematous  patches and papules with hyperkeratotic scale distributed on the left hand and left cheek .    Plan: Counseling.  I counseled the patient regarding the following:  Skin Care: Sun protective clothing and broad spectrum sunscreen can prevent the formation of Actinic  Keratoses. AKs can resolve with cryotherapy, photodynamic therapy, imiquimod, topical 5-FU.  Expectations: Actinic Keratoses are precancerous proliferations that occur within sun damaged skin. If untreated,  a small subset of AKs can develop into Squamous Cell Carcinoma.  Contact Office if: If AKs fail to resolve despite treatment, or if you develop a side effect from therapy, such as  unbearable crusting, scabbing, redness and tenderness.    Plan: Liquid Nitrogen.  A total of 4 lesions were treated with liquid nitrogen for 2 freeze-thaw cycles lasting 5 seconds, located on the above locations.   The patient's consent was obtained including but not limited to risks of crusting, scabbing,  blistering, scarring, darker or lighter pigmentary change, recurrence, incomplete removal and infection.    Callus (clavus)   - hyperkeratotic plaques on right knee and left index finger  - Will monior left finger to R/O wart   - start urea    Seborrheic Dermatitis  - clear    Status: stable    Plan: Counseling.  I counseled the patient regarding the following:  Skin care: Emollients, shampoos with tar, selenium or zinc pyrithione can improve seborrheic dermatitis.  Expectations: Seborrheic Dermatitis is chronic in nature with periods of remissions and flares. Flares can be  triggered by stress.  Contact office if: Seborrheic dermatitis worsens, or fails to improve despite several months of treatment.    Plan: Prescription   - Will refill ketoconazole shampoo    History of non-melanoma skin cancer (Z85.828)  - Well healed scar with NER  Associated diagnosis: Medical surveillance following completed treatment    Plan: Monitoring.    Other Skin Changes Due to Chronic  Exposure of Nonionizing Radiation (L57.8)    Plan: Monitoring.     Plan: Sunscreen Recommendations.  I recommended a broad spectrum sunscreen with a SPF of 30 or higher. I explained that SPF 30 sunscreens block approximately 97 percent of the  sun's harmful rays. Sunscreens should be applied at least 15 minutes prior to expected sun exposure and then every 2 hours after that as long as  sun exposure continues. If swimming or exercising sunscreen should be reapplied every 45 minutes to an hour after getting wet or sweating. One  ounce, or the equivalent of a shot glass full of sunscreen, is adequate to protect the skin not covered by a bathing suit. I also recommended a lip  balm with a sunscreen as well. Sun protective clothing can be used in lieu of sunscreen but must be worn the entire time you are exposed to the  sun's rays.      Follow up in about 7 months (around 7/7/2024) for Skin Check .    Lilo De Anda MD       ambulatory

## 2023-12-28 NOTE — DISCHARGE NOTE ADULT - CARE PROVIDERS DIRECT ADDRESSES
Pt called complaining of still being plugged up (sinus and chest).  Pt requesting more cough syrup and a medrol or Zpak called in.  Send to [facility] listed in her chart.  LG to call back.  LG   ,DirectAddress_Unknown,[email or web address],DirectAddress_Unknown

## 2024-01-02 ENCOUNTER — NON-APPOINTMENT (OUTPATIENT)
Age: 89
End: 2024-01-02

## 2024-02-20 ENCOUNTER — APPOINTMENT (OUTPATIENT)
Dept: UROGYNECOLOGY | Facility: CLINIC | Age: 89
End: 2024-02-20
Payer: MEDICARE

## 2024-02-20 VITALS
HEART RATE: 69 BPM | DIASTOLIC BLOOD PRESSURE: 69 MMHG | SYSTOLIC BLOOD PRESSURE: 138 MMHG | WEIGHT: 124 LBS | BODY MASS INDEX: 26.03 KG/M2 | HEIGHT: 58 IN

## 2024-02-20 PROCEDURE — 99214 OFFICE O/P EST MOD 30 MIN: CPT

## 2024-02-20 NOTE — DISCUSSION/SUMMARY
[FreeTextEntry1] : Rectocele Ring and support # 3 removed, cleaned, inspected and NOT reinserted. The patient tolerated this well.  Active bleeding noted on the right vaginal wall. Hemostasis obtained with [place] x 2 No lesions, abnormal discharge were noted.  Pessary not placed back in to allow healing.  The patient will follow up in 1 months for re-evaluation and PVR check. Advised that she may not need pessary if she's not bothered by the prolapse and PVR is normal.   Atrophic Vaginitis: Advised to continue to apply a pea size amount of the cream to the opening of the vagina three nights per week.

## 2024-02-20 NOTE — HISTORY OF PRESENT ILLNESS
[FreeTextEntry1] : Patient is here for routine pessary cleaning for rectocele.  Last seen 11/22/23 for pessary cleaning. ring and support # 4 changed to ring and support # 3.  s/p SAGRARIO/BS0/appy (fibroids) no Glaucoma Denies stress incontinence.  Not sexually active No history of incomplete bladder emptying without reduction.  Fitted: Ring and support #4 for rectocele  Estrace cream  11/22/23: Ring and support # 4 changed to ring and support # 3 due to pt being very uncomfortable with the larger pessary when it was taken out and placed back in  Today, patient is doing well. Reports that shortly after last visit she started to have pink spotting on and off, once a week or so. Denies any pain and otherwise is happy with the pesary. Has not used oil as advised.

## 2024-02-20 NOTE — REASON FOR VISIT
[TextEntry] : Reason for visit: 3 Month Pessary Maintenance Voids per day: 4-5 Voids per night: 2-3   Urge incontinence: No  Stress incontinence: No Constipation: Occasionally, increasing vegetables and water intake Fecal incontinence: No Vaginal bulge: No, pessary in place

## 2024-02-20 NOTE — COUNSELING
[FreeTextEntry1] : Today the pessary was removed and NOT reinserted due to bleeding.   Please call the office if you have any issues with vaginal pain, vaginal bleeding, difficulty urinating.  Please continue to apply a pea size amount of the cream to the opening of the vagina three nights per week.  Please follow up for re-evaluation and PVR check in 1 month with SHA Brown.

## 2024-02-26 RX ORDER — ESTRADIOL 0.1 MG/G
0.1 CREAM VAGINAL
Qty: 1 | Refills: 1 | Status: ACTIVE | COMMUNITY
Start: 2020-08-13 | End: 1900-01-01

## 2024-04-25 ENCOUNTER — APPOINTMENT (OUTPATIENT)
Dept: UROGYNECOLOGY | Facility: CLINIC | Age: 89
End: 2024-04-25
Payer: MEDICARE

## 2024-04-25 VITALS
SYSTOLIC BLOOD PRESSURE: 145 MMHG | DIASTOLIC BLOOD PRESSURE: 67 MMHG | WEIGHT: 126 LBS | HEART RATE: 76 BPM | BODY MASS INDEX: 26.45 KG/M2 | HEIGHT: 58 IN

## 2024-04-25 DIAGNOSIS — R33.9 RETENTION OF URINE, UNSPECIFIED: ICD-10-CM

## 2024-04-25 DIAGNOSIS — N81.6 RECTOCELE: ICD-10-CM

## 2024-04-25 PROCEDURE — 51701 INSERT BLADDER CATHETER: CPT

## 2024-04-25 PROCEDURE — 99459 PELVIC EXAMINATION: CPT

## 2024-04-25 PROCEDURE — 99214 OFFICE O/P EST MOD 30 MIN: CPT | Mod: 25

## 2024-04-25 NOTE — END OF VISIT
[TextEntry] :  I, Mariposa Garza PA-C, spent 30 minutes face to face with the patient. This excludes cath

## 2024-04-25 NOTE — PHYSICAL EXAM
[Chaperone Present] : A chaperone was present in the examining room during all aspects of the physical examination [[id number]] : A chaperone was present during the pelvic exam. [No Acute Distress] : in no acute distress [Well developed] : well developed [Well Nourished] : ~L well nourished [FreeTextEntry2] : Ellie ZELAYA [FreeTextEntry1] : Indication:  Urethra was prepped in sterile fashion and then a sterile non- indwelling catheter (14F) was used by me to drain the bladder. The patient tolerated the procedure well. void: 50 cc PVR: 180 cc

## 2024-04-25 NOTE — COUNSELING
[FreeTextEntry1] :  We will contact you with urine results.  If the urine culture shows an infection, we will treat the infection and check to see if you empty your bladder better after treating the infection.  If the urine culture shows no infection, we will discuss the next steps in management.

## 2024-04-25 NOTE — DISCUSSION/SUMMARY
[FreeTextEntry1] :  Incomplete Bladder Emptying Discussed with the patient that we are concerned about incomplete bladder emptying because it can cause recurrent UTI and can cause kidney damage. The patient voiced understanding. Urine culture obtained. Will follow up. Will treat accordingly if necessary If the urine culture is positive, will treat and then recheck PVR If negative, consider pessary reinsertion and PVR check  Rectocele + prolapse on exam, not coming past the introitus Patient happy without the pessary, would like to wait for urine results. May consider pessary if necessary in the future

## 2024-04-25 NOTE — REASON FOR VISIT
[TextEntry] : Reason for visit: Follow Up PVR Check Voids per day:  4-5  Voids per night: 2-3  Urge incontinence: Rarely  Stress incontinence: No Constipation: Occasionally, increased vegetables/water intake  Fecal incontinence: No  Vaginal bulge: No

## 2024-04-25 NOTE — HISTORY OF PRESENT ILLNESS
[FreeTextEntry1] : Patient is here for PVR check Last seen 2/20/2024 for pessary cleaning-ring and support #3 removed and given pessary holiday  no Glaucoma Denies stress incontinence.  Not sexually active No history of incomplete bladder emptying without reduction.  Fitted: Ring and support #4 for rectocele  Estrace cream  11/22/23: Ring and support # 4 changed to ring and support # 3 due to pt being very uncomfortable with the larger pessary when it was taken out and placed back in Last visit-ring and support #3 removed and patient was given pessary holiday  Today, patient is doing well and has no complaints. Notes that she goes to urinate at nighttime, also does not feel the same urge to urinate ever since she had a Gaviria catheter placed a few years ago. Denies burning or pain with urination. Does not feel bothered by the bulge. Feels that she empties well. Happy without the pessary.

## 2024-04-26 RX ORDER — SULFAMETHOXAZOLE AND TRIMETHOPRIM 800; 160 MG/1; MG/1
800-160 TABLET ORAL
Qty: 6 | Refills: 0 | Status: ACTIVE | COMMUNITY
Start: 2024-04-26 | End: 1900-01-01

## 2024-04-29 LAB — URINE CULTURE <10: NORMAL

## 2024-10-30 ENCOUNTER — APPOINTMENT (OUTPATIENT)
Dept: UROGYNECOLOGY | Facility: CLINIC | Age: 89
End: 2024-10-30
Payer: MEDICARE

## 2024-10-30 VITALS
DIASTOLIC BLOOD PRESSURE: 75 MMHG | SYSTOLIC BLOOD PRESSURE: 159 MMHG | WEIGHT: 125 LBS | HEIGHT: 58 IN | HEART RATE: 75 BPM | BODY MASS INDEX: 26.24 KG/M2

## 2024-10-30 DIAGNOSIS — N95.2 POSTMENOPAUSAL ATROPHIC VAGINITIS: ICD-10-CM

## 2024-10-30 DIAGNOSIS — R33.9 RETENTION OF URINE, UNSPECIFIED: ICD-10-CM

## 2024-10-30 DIAGNOSIS — N81.6 RECTOCELE: ICD-10-CM

## 2024-10-30 PROCEDURE — 99214 OFFICE O/P EST MOD 30 MIN: CPT | Mod: 25

## 2024-10-30 PROCEDURE — 57160 INSERT PESSARY/OTHER DEVICE: CPT

## 2024-10-30 PROCEDURE — A4562: CPT

## 2024-10-30 PROCEDURE — 99459 PELVIC EXAMINATION: CPT

## 2024-11-04 LAB — URINE CULTURE <10: NORMAL

## 2024-11-25 ENCOUNTER — APPOINTMENT (OUTPATIENT)
Dept: UROGYNECOLOGY | Facility: CLINIC | Age: 89
End: 2024-11-25

## 2024-11-25 VITALS
WEIGHT: 125 LBS | HEART RATE: 69 BPM | DIASTOLIC BLOOD PRESSURE: 63 MMHG | SYSTOLIC BLOOD PRESSURE: 122 MMHG | BODY MASS INDEX: 26.24 KG/M2 | HEIGHT: 58 IN

## 2024-11-25 DIAGNOSIS — N89.8 OTHER SPECIFIED NONINFLAMMATORY DISORDERS OF VAGINA: ICD-10-CM

## 2024-11-25 DIAGNOSIS — N81.6 RECTOCELE: ICD-10-CM

## 2024-11-25 DIAGNOSIS — N95.2 POSTMENOPAUSAL ATROPHIC VAGINITIS: ICD-10-CM

## 2024-11-25 DIAGNOSIS — R33.9 RETENTION OF URINE, UNSPECIFIED: ICD-10-CM

## 2024-11-25 PROCEDURE — 51701 INSERT BLADDER CATHETER: CPT

## 2024-11-25 PROCEDURE — 99459 PELVIC EXAMINATION: CPT

## 2024-11-25 PROCEDURE — 99214 OFFICE O/P EST MOD 30 MIN: CPT

## 2024-11-25 PROCEDURE — G2211 COMPLEX E/M VISIT ADD ON: CPT

## 2025-01-24 NOTE — ED PROVIDER NOTE - CONDITION AT DISCHARGE:
Pt name and  verified     Pt informed of results and recommendations. Pt verbalized understanding and agreed.     Satisfactory

## 2025-03-25 ENCOUNTER — APPOINTMENT (OUTPATIENT)
Dept: UROGYNECOLOGY | Facility: CLINIC | Age: 89
End: 2025-03-25
Payer: MEDICARE

## 2025-03-25 VITALS
BODY MASS INDEX: 26.24 KG/M2 | HEART RATE: 71 BPM | SYSTOLIC BLOOD PRESSURE: 153 MMHG | WEIGHT: 125 LBS | HEIGHT: 58 IN | DIASTOLIC BLOOD PRESSURE: 71 MMHG

## 2025-03-25 DIAGNOSIS — N95.2 POSTMENOPAUSAL ATROPHIC VAGINITIS: ICD-10-CM

## 2025-03-25 DIAGNOSIS — N81.6 RECTOCELE: ICD-10-CM

## 2025-03-25 PROCEDURE — 99459 PELVIC EXAMINATION: CPT

## 2025-03-25 PROCEDURE — 99214 OFFICE O/P EST MOD 30 MIN: CPT

## 2025-06-30 ENCOUNTER — APPOINTMENT (OUTPATIENT)
Dept: UROGYNECOLOGY | Facility: CLINIC | Age: 89
End: 2025-06-30
Payer: MEDICARE

## 2025-06-30 VITALS
HEART RATE: 71 BPM | WEIGHT: 125 LBS | HEIGHT: 58 IN | SYSTOLIC BLOOD PRESSURE: 155 MMHG | BODY MASS INDEX: 26.24 KG/M2 | DIASTOLIC BLOOD PRESSURE: 76 MMHG

## 2025-06-30 PROCEDURE — 99214 OFFICE O/P EST MOD 30 MIN: CPT

## 2025-06-30 PROCEDURE — G2211 COMPLEX E/M VISIT ADD ON: CPT

## 2025-06-30 PROCEDURE — 99459 PELVIC EXAMINATION: CPT

## 2025-06-30 RX ORDER — ATENOLOL 50 MG/1
50 TABLET ORAL
Refills: 0 | Status: ACTIVE | COMMUNITY

## 2025-06-30 RX ORDER — AMLODIPINE BESYLATE 5 MG/1
5 TABLET ORAL
Refills: 0 | Status: ACTIVE | COMMUNITY

## 2025-08-22 ENCOUNTER — APPOINTMENT (OUTPATIENT)
Dept: UROGYNECOLOGY | Facility: CLINIC | Age: 89
End: 2025-08-22
Payer: MEDICARE

## 2025-08-22 VITALS
SYSTOLIC BLOOD PRESSURE: 160 MMHG | WEIGHT: 124 LBS | DIASTOLIC BLOOD PRESSURE: 76 MMHG | HEART RATE: 69 BPM | HEIGHT: 58 IN | BODY MASS INDEX: 26.03 KG/M2

## 2025-08-22 DIAGNOSIS — Z87.42 PERSONAL HISTORY OF OTHER DISEASES OF THE FEMALE GENITAL TRACT: ICD-10-CM

## 2025-08-22 DIAGNOSIS — N95.2 POSTMENOPAUSAL ATROPHIC VAGINITIS: ICD-10-CM

## 2025-08-22 DIAGNOSIS — N81.6 RECTOCELE: ICD-10-CM

## 2025-08-22 DIAGNOSIS — R33.9 RETENTION OF URINE, UNSPECIFIED: ICD-10-CM

## 2025-08-22 PROCEDURE — 99459 PELVIC EXAMINATION: CPT

## 2025-08-22 PROCEDURE — 51701 INSERT BLADDER CATHETER: CPT

## 2025-08-22 PROCEDURE — 99214 OFFICE O/P EST MOD 30 MIN: CPT | Mod: 25

## 2025-08-25 LAB — URINE CULTURE <10: NORMAL
